# Patient Record
Sex: MALE | Race: WHITE | Employment: UNEMPLOYED | URBAN - METROPOLITAN AREA
[De-identification: names, ages, dates, MRNs, and addresses within clinical notes are randomized per-mention and may not be internally consistent; named-entity substitution may affect disease eponyms.]

---

## 2019-12-03 ENCOUNTER — HOSPITAL ENCOUNTER (INPATIENT)
Age: 39
LOS: 8 days | Discharge: HOME | DRG: 175 | End: 2019-12-11
Attending: INTERNAL MEDICINE | Admitting: INTERNAL MEDICINE
Payer: MEDICAID

## 2019-12-03 DIAGNOSIS — I20.0 UNSTABLE ANGINA (HCC): ICD-10-CM

## 2019-12-03 DIAGNOSIS — I50.9 CONGESTIVE HEART FAILURE (CHF) (HCC): ICD-10-CM

## 2019-12-03 PROCEDURE — 65660000000 HC RM CCU STEPDOWN

## 2019-12-03 NOTE — Clinical Note
Lesion: Located in the Distal RCA. Stent deployed. Single technique used. First inflation pressure = 12 yan; inflation time: 10 sec.

## 2019-12-03 NOTE — Clinical Note
Right radial clipped, prepped with ChloraPrep and draped. Wet prep solution applied at: 755. Wet prep solution dried at: 757. Wet prep elapsed drying time: 2 mins.

## 2019-12-03 NOTE — Clinical Note
Lesion: Located in the R JATINDER. Stent deployed. Multiple inflations used. First inflation pressure = 10 yan; inflation time: 12 sec. Second inflation pressure: 12 yan; inflation time: 6 sec. Third inflation pressure: 12 yan; inflation time: 8 sec.

## 2019-12-03 NOTE — Clinical Note
Lesion located in the Distal RCA. Balloon inflated using multiple inflations inflation technique. Pressure = 8 yan; Duration = 10 sec. Inflation 2: Pressure: 8 yan; Duration: 7 sec.

## 2019-12-03 NOTE — Clinical Note
TRANSFER - OUT REPORT:     Verbal report given to: [facility].. Report consisted of patient's Situation, Background, Assessment and   Recommendations(SBAR). Opportunity for questions and clarification was provided. Patient transported with a Registered Nurse. Patient transported to: IVCU.

## 2019-12-03 NOTE — Clinical Note
TRANSFER - OUT REPORT:     Verbal report given to: KEYANA Briggs. Report consisted of patient's Situation, Background, Assessment and   Recommendations(SBAR). Opportunity for questions and clarification was provided. Patient transported to: IVCU.

## 2019-12-04 ENCOUNTER — APPOINTMENT (OUTPATIENT)
Dept: NON INVASIVE DIAGNOSTICS | Age: 39
DRG: 175 | End: 2019-12-04
Attending: INTERNAL MEDICINE
Payer: MEDICAID

## 2019-12-04 ENCOUNTER — APPOINTMENT (OUTPATIENT)
Dept: VASCULAR SURGERY | Age: 39
DRG: 175 | End: 2019-12-04
Attending: NURSE PRACTITIONER
Payer: MEDICAID

## 2019-12-04 PROBLEM — I10 HYPERTENSION: Status: ACTIVE | Noted: 2019-12-04

## 2019-12-04 LAB
ANION GAP SERPL CALC-SCNC: 7 MMOL/L (ref 5–15)
AV VELOCITY RATIO: 0.75
AV VTI RATIO: 0.8
BUN SERPL-MCNC: 25 MG/DL (ref 6–20)
BUN/CREAT SERPL: 20 (ref 12–20)
CALCIUM SERPL-MCNC: 8.3 MG/DL (ref 8.5–10.1)
CHLORIDE SERPL-SCNC: 109 MMOL/L (ref 97–108)
CO2 SERPL-SCNC: 26 MMOL/L (ref 21–32)
CREAT SERPL-MCNC: 1.27 MG/DL (ref 0.7–1.3)
ECHO AO ROOT DIAM: 3.16 CM
ECHO AV AREA PEAK VELOCITY: 2.4 CM2
ECHO AV AREA VTI: 2.7 CM2
ECHO AV MEAN GRADIENT: 2.1 MMHG
ECHO AV MEAN VELOCITY: 0.68 M/S
ECHO AV PEAK GRADIENT: 4.1 MMHG
ECHO AV PEAK VELOCITY: 100.89 CM/S
ECHO AV VTI: 11.95 CM
ECHO EST RA PRESSURE: 10 MMHG
ECHO LA MAJOR AXIS: 4.28 CM
ECHO LA TO AORTIC ROOT RATIO: 1.36
ECHO LV E' LATERAL VELOCITY: 8.3 CM/S
ECHO LV E' SEPTAL VELOCITY: 4.01 CM/S
ECHO LV INTERNAL DIMENSION DIASTOLIC MMODE: 5.65 CM
ECHO LV INTERNAL DIMENSION DIASTOLIC: 6.2 CM (ref 4.2–5.9)
ECHO LV INTERNAL DIMENSION SYSTOLIC MMODE: 5.03 CM
ECHO LV INTERNAL DIMENSION SYSTOLIC: 5.25 CM
ECHO LV IVSD: 1.16 CM (ref 0.6–1)
ECHO LV MASS 2D: 414 G (ref 88–224)
ECHO LV MASS INDEX 2D: 191.6 G/M2 (ref 49–115)
ECHO LV POSTERIOR WALL DIASTOLIC: 1.1 CM (ref 0.6–1)
ECHO LVOT CARDIAC OUTPUT: 3.3 L/MIN
ECHO LVOT DIAM: 2.01 CM
ECHO LVOT PEAK GRADIENT: 2.3 MMHG
ECHO LVOT PEAK VELOCITY: 75.24 CM/S
ECHO LVOT SV: 31.7 ML
ECHO LVOT VTI: 10.02 CM
ECHO MV A VELOCITY: 42.25 CM/S
ECHO MV AREA PHT: 7.3 CM2
ECHO MV AREA VTI: 3.5 CM2
ECHO MV E DECELERATION TIME (DT): 71.8 MS
ECHO MV E VELOCITY: 82.48 CM/S
ECHO MV E/A RATIO: 1.95
ECHO MV E/E' LATERAL: 9.94
ECHO MV E/E' RATIO (AVERAGED): 15.25
ECHO MV E/E' SEPTAL: 20.57
ECHO MV MAX VELOCITY: 84.23 CM/S
ECHO MV MEAN GRADIENT: 1 MMHG
ECHO MV MEAN INFLOW VELOCITY: 0.46 M/S
ECHO MV PEAK GRADIENT: 2.8 MMHG
ECHO MV PRESSURE HALF TIME (PHT): 30.2 MS
ECHO MV VTI: 9.02 CM
ECHO PULMONARY ARTERY SYSTOLIC PRESSURE (PASP): 56.5 MMHG
ECHO PV MAX VELOCITY: 69.16 CM/S
ECHO PV MEAN GRADIENT: 0.9 MMHG
ECHO PV PEAK GRADIENT: 1.9 MMHG
ECHO PV VTI: 9.13 CM
ECHO RIGHT VENTRICULAR SYSTOLIC PRESSURE (RVSP): 56.5 MMHG
ECHO TV REGURGITANT MAX VELOCITY: 340.81 CM/S
ECHO TV REGURGITANT PEAK GRADIENT: 46.5 MMHG
ERYTHROCYTE [DISTWIDTH] IN BLOOD BY AUTOMATED COUNT: 17.4 % (ref 11.5–14.5)
GLUCOSE BLD STRIP.AUTO-MCNC: 126 MG/DL (ref 65–100)
GLUCOSE BLD STRIP.AUTO-MCNC: 147 MG/DL (ref 65–100)
GLUCOSE BLD STRIP.AUTO-MCNC: 157 MG/DL (ref 65–100)
GLUCOSE SERPL-MCNC: 70 MG/DL (ref 65–100)
HCT VFR BLD AUTO: 35.4 % (ref 36.6–50.3)
HGB BLD-MCNC: 10.6 G/DL (ref 12.1–17)
LVFS 2D: 2.02 %
LVFS: 10.91 %
LVOT MG: 0.94 MMHG
LVOT MV: 0.42 CM/S
MCH RBC QN AUTO: 23.5 PG (ref 26–34)
MCHC RBC AUTO-ENTMCNC: 29.9 G/DL (ref 30–36.5)
MCV RBC AUTO: 78.5 FL (ref 80–99)
MV DEC SLOPE: 11.49
NRBC # BLD: 0 K/UL (ref 0–0.01)
NRBC BLD-RTO: 0 PER 100 WBC
PLATELET # BLD AUTO: 191 K/UL (ref 150–400)
PMV BLD AUTO: 11.1 FL (ref 8.9–12.9)
POTASSIUM SERPL-SCNC: 3.4 MMOL/L (ref 3.5–5.1)
PULMONARY ARTERY END DIASTOLIC PRESSURE: 16.4 MMHG
PULMONARY ARTERY MEAN PRESURE: 29.7 MMHG
PV END DIASTOLIC VELOCITY: 1.3 MMHG
RBC # BLD AUTO: 4.51 M/UL (ref 4.1–5.7)
SERVICE CMNT-IMP: ABNORMAL
SODIUM SERPL-SCNC: 142 MMOL/L (ref 136–145)
TROPONIN I SERPL-MCNC: <0.05 NG/ML
TROPONIN I SERPL-MCNC: <0.05 NG/ML
WBC # BLD AUTO: 4.7 K/UL (ref 4.1–11.1)

## 2019-12-04 PROCEDURE — C8929 TTE W OR WO FOL WCON,DOPPLER: HCPCS

## 2019-12-04 PROCEDURE — 85027 COMPLETE CBC AUTOMATED: CPT

## 2019-12-04 PROCEDURE — 80048 BASIC METABOLIC PNL TOTAL CA: CPT

## 2019-12-04 PROCEDURE — 65660000000 HC RM CCU STEPDOWN

## 2019-12-04 PROCEDURE — 74011000258 HC RX REV CODE- 258: Performed by: INTERNAL MEDICINE

## 2019-12-04 PROCEDURE — 74011250636 HC RX REV CODE- 250/636: Performed by: INTERNAL MEDICINE

## 2019-12-04 PROCEDURE — 36415 COLL VENOUS BLD VENIPUNCTURE: CPT

## 2019-12-04 PROCEDURE — 74011250637 HC RX REV CODE- 250/637: Performed by: NURSE PRACTITIONER

## 2019-12-04 PROCEDURE — 84484 ASSAY OF TROPONIN QUANT: CPT

## 2019-12-04 PROCEDURE — 82962 GLUCOSE BLOOD TEST: CPT

## 2019-12-04 PROCEDURE — 93922 UPR/L XTREMITY ART 2 LEVELS: CPT

## 2019-12-04 PROCEDURE — 94760 N-INVAS EAR/PLS OXIMETRY 1: CPT

## 2019-12-04 PROCEDURE — 77030011943

## 2019-12-04 RX ORDER — IPRATROPIUM BROMIDE AND ALBUTEROL SULFATE 2.5; .5 MG/3ML; MG/3ML
3 SOLUTION RESPIRATORY (INHALATION)
Status: DISCONTINUED | OUTPATIENT
Start: 2019-12-04 | End: 2019-12-11 | Stop reason: HOSPADM

## 2019-12-04 RX ORDER — ENOXAPARIN SODIUM 100 MG/ML
40 INJECTION SUBCUTANEOUS EVERY 24 HOURS
Status: DISCONTINUED | OUTPATIENT
Start: 2019-12-04 | End: 2019-12-08

## 2019-12-04 RX ORDER — MAGNESIUM SULFATE 100 %
4 CRYSTALS MISCELLANEOUS AS NEEDED
Status: DISCONTINUED | OUTPATIENT
Start: 2019-12-04 | End: 2019-12-11 | Stop reason: HOSPADM

## 2019-12-04 RX ORDER — FUROSEMIDE 10 MG/ML
40 INJECTION INTRAMUSCULAR; INTRAVENOUS 2 TIMES DAILY
Status: DISCONTINUED | OUTPATIENT
Start: 2019-12-04 | End: 2019-12-06

## 2019-12-04 RX ORDER — ONDANSETRON 2 MG/ML
4 INJECTION INTRAMUSCULAR; INTRAVENOUS
Status: DISCONTINUED | OUTPATIENT
Start: 2019-12-04 | End: 2019-12-11 | Stop reason: HOSPADM

## 2019-12-04 RX ORDER — SODIUM CHLORIDE 0.9 % (FLUSH) 0.9 %
5-40 SYRINGE (ML) INJECTION EVERY 8 HOURS
Status: DISCONTINUED | OUTPATIENT
Start: 2019-12-04 | End: 2019-12-09 | Stop reason: SDUPTHER

## 2019-12-04 RX ORDER — ACETAMINOPHEN 325 MG/1
650 TABLET ORAL
Status: DISCONTINUED | OUTPATIENT
Start: 2019-12-04 | End: 2019-12-11 | Stop reason: HOSPADM

## 2019-12-04 RX ORDER — SODIUM CHLORIDE 0.9 % (FLUSH) 0.9 %
5-40 SYRINGE (ML) INJECTION AS NEEDED
Status: DISCONTINUED | OUTPATIENT
Start: 2019-12-04 | End: 2019-12-09 | Stop reason: SDUPTHER

## 2019-12-04 RX ORDER — CARVEDILOL 3.12 MG/1
3.12 TABLET ORAL 2 TIMES DAILY
Status: DISCONTINUED | OUTPATIENT
Start: 2019-12-04 | End: 2019-12-05

## 2019-12-04 RX ORDER — BISACODYL 5 MG
5 TABLET, DELAYED RELEASE (ENTERIC COATED) ORAL DAILY PRN
Status: DISCONTINUED | OUTPATIENT
Start: 2019-12-04 | End: 2019-12-11 | Stop reason: HOSPADM

## 2019-12-04 RX ORDER — INSULIN LISPRO 100 [IU]/ML
INJECTION, SOLUTION INTRAVENOUS; SUBCUTANEOUS
Status: DISCONTINUED | OUTPATIENT
Start: 2019-12-04 | End: 2019-12-11 | Stop reason: HOSPADM

## 2019-12-04 RX ORDER — DEXTROSE 50 % IN WATER (D50W) INTRAVENOUS SYRINGE
12.5-25 AS NEEDED
Status: DISCONTINUED | OUTPATIENT
Start: 2019-12-04 | End: 2019-12-11 | Stop reason: HOSPADM

## 2019-12-04 RX ADMIN — PERFLUTREN 2 ML: 6.52 INJECTION, SUSPENSION INTRAVENOUS at 12:55

## 2019-12-04 RX ADMIN — Medication 10 ML: at 02:30

## 2019-12-04 RX ADMIN — CEFTRIAXONE 1 G: 1 INJECTION, POWDER, FOR SOLUTION INTRAMUSCULAR; INTRAVENOUS at 07:11

## 2019-12-04 RX ADMIN — FUROSEMIDE 40 MG: 10 INJECTION, SOLUTION INTRAMUSCULAR; INTRAVENOUS at 17:39

## 2019-12-04 RX ADMIN — FUROSEMIDE 40 MG: 10 INJECTION, SOLUTION INTRAMUSCULAR; INTRAVENOUS at 14:43

## 2019-12-04 RX ADMIN — Medication 10 ML: at 14:44

## 2019-12-04 RX ADMIN — CARVEDILOL 3.12 MG: 3.12 TABLET, FILM COATED ORAL at 17:38

## 2019-12-04 RX ADMIN — ENOXAPARIN SODIUM 40 MG: 40 INJECTION SUBCUTANEOUS at 02:15

## 2019-12-04 RX ADMIN — Medication 10 ML: at 22:05

## 2019-12-04 RX ADMIN — Medication 10 ML: at 07:12

## 2019-12-04 NOTE — PROGRESS NOTES
Patient admitted to room 2217 via stretcher. Patient direct admit from [facility].  Patient alert & oriented x3. VSS. Assessment completed. Call bell within reach. Sinus rhythm on tele monitor.   Notified Dr. Francy Gottron of patient's arrival.

## 2019-12-04 NOTE — WOUND CARE
Wound care nurse consult for POA DM feet wounds. Left dorsal foot:  
 
Right plantar 5th metatarsal 
 
Right plantar 1st metatarsal 
 
Right 1st toe wound with nail trimmed to close by patient: 
 
 
 
 
[address] Wound Foot Plantar;Medial;Right 1st metatarsal head (Active) Dressing Type Open to air 12/4/2019  3:16 PM  
Non-staged Wound Description Full thickness 12/4/2019  3:16 PM  
Wound Length (cm) 0.5 cm 12/4/2019  3:16 PM  
Wound Width (cm) 1 cm 12/4/2019  3:16 PM  
Wound Depth (cm) 0.3 cm 12/4/2019  3:16 PM  
Wound Surface Area (cm^2) 0.5 cm^2 12/4/2019  3:16 PM  
Wound Volume (cm^3) 0.15 cm^3 12/4/2019  3:16 PM  
Condition of [place] 12/4/2019  3:16 PM  
Condition of Edges Calloused 12/4/2019  3:16 PM  
Assessment Dry;Red 12/4/2019  8:00 AM  
Drainage Amount Scant 12/4/2019  3:16 PM  
Drainage Color Serosanguinous 12/4/2019  3:16 PM  
Wound Odor None 12/4/2019  3:16 PM  
Coco-wound Assessment Dry 12/4/2019  3:16 PM  
Cleansing and Cleansing Agents  Dermal wound cleanser 12/4/2019  3:16 PM  
Dressing Changed Changed/New 12/4/2019  3:16 PM  
Dressing Type Applied Xeroform;4 x 4;Gauze wrap (roberto) 12/4/2019  3:16 PM  
Procedure Tolerated Well 12/4/2019  3:16 PM  
Number of days: 1 Wound Leg lower Left 1 x 0.75 cm weeping, yellow ulcer (Active) Dressing Type Open to air 12/4/2019  3:16 PM  
Non-staged Wound Description Partial thickness 12/4/2019  3:16 PM  
Wound Length (cm) 1.5 cm 12/4/2019  3:16 PM  
Wound Width (cm) 1.5 cm 12/4/2019  3:16 PM  
Wound Depth (cm) 0.1 cm 12/4/2019  3:16 PM  
Wound Surface Area (cm^2) 2.25 cm^2 12/4/2019  3:16 PM  
Wound Volume (cm^3) 0.22 cm^3 12/4/2019  3:16 PM  
Condition of [place] 12/4/2019  3:16 PM  
Assessment Dry;Red 12/4/2019  8:00 AM  
Drainage Amount None 12/4/2019  3:16 PM  
Wound Odor None 12/4/2019  3:16 PM  
Coco-wound Assessment Blanchable erythema;Edema 12/4/2019  3:16 PM  
Procedure Tolerated Well 12/4/2019  3:16 PM  
 Number of days: 1 Wound Foot Left;Dorsal (Active) Dressing Type Open to air 12/4/2019  3:16 PM  
Non-staged Wound Description Full thickness 12/4/2019  3:16 PM  
Wound Length (cm) 1.5 cm 12/4/2019  3:16 PM  
Wound Width (cm) 2 cm 12/4/2019  3:16 PM  
Wound Surface Area (cm^2) 3 cm^2 12/4/2019  3:16 PM  
Condition of Base [place];Slough 12/4/2019  3:16 PM  
Tissue Type Percent Pink 50 12/4/2019  3:16 PM  
Tissue Type Percent Yellow 50 12/4/2019  3:16 PM  
Drainage Amount None 12/4/2019  3:16 PM  
Wound Odor None 12/4/2019  3:16 PM  
Coco-wound Assessment Blanchable erythema 12/4/2019  3:16 PM  
Cleansing and Cleansing Agents  Dermal wound cleanser 12/4/2019  3:16 PM  
Dressing Changed Changed/New 12/4/2019  3:16 PM  
Dressing Type Applied Xeroform; Foam 12/4/2019  3:16 PM  
Procedure Tolerated Well 12/4/2019  3:16 PM  
Number of days: 0 Wound Foot Right;Plantar;Lateral 5th metatarsal head (Active) Dressing Type Open to air 12/4/2019  3:16 PM  
Non-staged Wound Description Full thickness 12/4/2019  3:16 PM  
Wound Length (cm) 1 cm 12/4/2019  3:16 PM  
Wound Width (cm) 1 cm 12/4/2019  3:16 PM  
Wound Depth (cm) 0.3 cm 12/4/2019  3:16 PM  
Wound Surface Area (cm^2) 1 cm^2 12/4/2019  3:16 PM  
Wound Volume (cm^3) 0.3 cm^3 12/4/2019  3:16 PM  
Condition of [facility] 12/4/2019  3:16 PM  
Condition of Edges Calloused 12/4/2019  3:16 PM  
Tissue Type Percent Yellow 100 12/4/2019  3:16 PM  
Drainage Amount Scant 12/4/2019  3:16 PM  
Drainage Color Serous 12/4/2019  3:16 PM  
Wound Odor None 12/4/2019  3:16 PM  
Coco-wound Assessment Blanchable erythema; Intact 12/4/2019  3:16 PM  
Cleansing and Cleansing Agents  Dermal wound cleanser 12/4/2019  3:16 PM  
Dressing Changed Changed/New 12/4/2019  3:16 PM  
Dressing Type Applied Xeroform;4 x 4;Gauze wrap (roberto) 12/4/2019  3:16 PM  
Procedure Tolerated Well 12/4/2019  3:16 PM  
Number of days: 0 Wound Toe (Comment  which one) Right;Dorsal 1st toe (Active) Dressing Type Open to air 12/4/2019  3:16 PM  
Non-staged Wound Description Partial thickness 12/4/2019  3:16 PM  
Wound Length (cm) 0.5 cm 12/4/2019  3:16 PM  
Wound Width (cm) 0.4 cm 12/4/2019  3:16 PM  
Wound Depth (cm) 0.2 cm 12/4/2019  3:16 PM  
Wound Surface Area (cm^2) 0.2 cm^2 12/4/2019  3:16 PM  
Wound Volume (cm^3) 0.04 cm^3 12/4/2019  3:16 PM  
Condition of Base [place] 12/4/2019  3:16 PM  
Condition of Edges Open 12/4/2019  3:16 PM  
Drainage Amount None 12/4/2019  3:16 PM  
Wound Odor None 12/4/2019  3:16 PM  
Coco-wound Assessment Blanchable erythema 12/4/2019  3:16 PM  
Cleansing and Cleansing Agents  Dermal wound cleanser 12/4/2019  3:16 PM  
Dressing Type Applied Open to air 12/4/2019  3:16 PM  
Procedure Tolerated Well 12/4/2019  3:16 PM  
Number of days: 0 Recommend: 
 
Bilateral feet wounds: daily, cleanse with dermal wound cleanser spray and wipe clean with 4x4. Apply Neosporin ointment to each wound. Cover left dorsal foot wound with a foam dressing and right foot with dry 4x4's secured with gauze roberto.  
 
Meghana Pradhan RN, WOCN

## 2019-12-04 NOTE — PROGRESS NOTES
Problem: Patient Education: Go to Patient Education Activity Goal: Patient/Family Education Outcome: Progressing Towards Goal 
  
Problem: Heart Failure: Day 1 Goal: Off Pathway (Use only if patient is Off Pathway) Outcome: Progressing Towards Goal 
Goal: Activity/Safety Outcome: Progressing Towards Goal 
Goal: Consults, if ordered Outcome: Progressing Towards Goal 
Goal: Diagnostic Test/Procedures Outcome: Progressing Towards Goal 
Goal: Nutrition/Diet Outcome: Progressing Towards Goal 
Goal: Discharge Planning Outcome: Progressing Towards Goal 
Goal: Medications Outcome: Progressing Towards Goal 
Goal: Respiratory Outcome: Progressing Towards Goal 
Goal: Treatments/Interventions/Procedures Outcome: Progressing Towards Goal 
Goal: Psychosocial 
Outcome: Progressing Towards Goal 
Goal: *Oxygen saturation within defined limits Outcome: Progressing Towards Goal 
Goal: *Hemodynamically stable Outcome: Progressing Towards Goal 
Goal: *Optimal pain control at patient's stated goal 
Outcome: Progressing Towards Goal 
Goal: *Anxiety reduced or absent Outcome: Progressing Towards Goal 
  
Problem: Risk for Spread of Infection Goal: Prevent transmission of infectious organism to others Description Prevent the transmission of infectious organisms to other patients, staff members, and visitors. Outcome: Progressing Towards Goal 
  
Problem: Patient Education:  Go to Education Activity Goal: Patient/Family Education Outcome: Progressing Towards Goal 
  
Problem: Pressure Injury - Risk of 
Goal: *Prevention of pressure injury Description Document Shukri Scale and appropriate interventions in the flowsheet.  
Outcome: Progressing Towards Goal 
Note: Pressure Injury Interventions: 
Sensory Interventions: Keep linens dry and wrinkle-free, Maintain/enhance activity level, Minimize linen layers, Pressure redistribution bed/mattress (bed type), Sit a 90-degree angle/use footstool if needed, Turn and reposition approx. every two hours (pillows and wedges if needed), Use 30-degree side-lying position Moisture Interventions: Absorbent underpads, Check for incontinence Q2 hours and as needed, Minimize layers, Offer toileting Q_hr Activity Interventions: Increase time out of bed, Pressure redistribution bed/mattress(bed type), PT/OT evaluation Mobility Interventions: HOB 30 degrees or less, Pressure redistribution bed/mattress (bed type), PT/OT evaluation, Turn and reposition approx. every two hours(pillow and wedges), Float heels Nutrition Interventions: Document food/fluid/supplement intake Friction and Shear Interventions: HOB 30 degrees or less, Minimize layers, Sit at 90-degree angle Problem: Patient Education: Go to Patient Education Activity Goal: Patient/Family Education Outcome: Progressing Towards Goal

## 2019-12-04 NOTE — PROGRESS NOTES
0935: Called cardiology consult and spoke with Víctor Espitia, primary care RN aware 
5687: Called vascular surgery consult and spoke with Mela Mckee primary care RN aware

## 2019-12-04 NOTE — CONSULTS
Vascular Surgery Consult Note 2019 Subjective:  
 
Janusz Moeller is a 44 y.o. male with a pmhx significant for CHF, obesity, and DM. He is admitted to the hospital with CHF exacerbation w/ anasarca. He presents w/ bilateral foot ulcerations and we have been asked to evaluate. He admits to non-compliance w/ his oral diabetic medications. Past Medical History Diabetes Obesity CHF Medical non-compliance Past Surgical History:  
Procedure Laterality Date  HX AMPUTATION  10/02/2012 Right 3rd toe Family History Problem Relation Age of Onset  Heart Disease Maternal Grandmother  Cancer Paternal Grandmother   
     Richard Jurist  Stroke Paternal Grandfather Social History Tobacco Use  Smoking status: Former Smoker Last attempt to quit: 2019 Years since quittin.0  Smokeless tobacco: Never Used Substance Use Topics  Alcohol use: No  
   
He is routinely independent of his ADLs. He has a supportive father and sister. Prior to Admission medications Not on File No Known Allergies Review of Systems Objective:  
 
 
Patient Vitals for the past 24 hrs: 
 BP Temp Pulse Resp SpO2 Height Weight 19 1225 (!) 142/103     6' 1\" (1.854 m) 91.6 kg (202 lb) 19 1137 (!) 142/103 98.2 °F (36.8 °C) (!) 106 18 98 %    
19 0800 (!) 138/96 98.1 °F (36.7 °C) 94 18 99 %    
19 0605       91.6 kg (202 lb) 19 0327 136/79 97.9 °F (36.6 °C) 91 18 97 %    
19 2245 (!) 152/96 98.4 °F (36.9 °C) 93 18 100 % 6' 1\" (1.854 m) 91.8 kg (202 lb 6.1 oz) Physical Exam 
Constitutional:   
   Appearance: He is obese. HENT:  
   Head: Normocephalic. Nose: Nose normal.  
   Mouth/Throat:  
   Mouth: Mucous membranes are moist.  
Eyes:  
   Pupils: Pupils are equal, round, and reactive to light. Neck: Musculoskeletal: Normal range of motion. Cardiovascular: Rate and Rhythm: Normal rate and regular rhythm. Pulses:     
     Femoral pulses are 2+ on the right side and 2+ on the left side. Popliteal pulses are 2+ on the right side and 2+ on the left side. Dorsalis pedis pulses are 1+ on the right side and 1+ on the left side. Comments: Distal pulse difficult to assess due to edema Pulmonary:  
   Effort: Pulmonary effort is normal.  
   Breath sounds: Normal breath sounds. Abdominal:  
   General: Abdomen is flat. Palpations: Abdomen is soft. Musculoskeletal: Normal range of motion. Skin: 
   General: Skin is warm. Comments: Ulcer to left anterior foot Ulcer to left shin Ulcer to left lateral leg Ulcer to the dorsum of the right foot Ulcer to the superior right great toe Ulcer to the ball of the right foot Neurological:  
   General: No focal deficit present. Mental Status: He is alert and oriented to person, place, and time. Psychiatric:     
   Mood and Affect: Mood normal.     
   Behavior: Behavior normal.  
 
 
Pertinent Test Results:  
Recent Results (from the past 24 hour(s)) EKG, 12 LEAD, INITIAL Collection Time: 12/03/19  3:21 PM  
Result Value Ref Range Ventricular Rate 100 BPM  
 Atrial Rate 100 BPM  
 P-R Interval 136 ms QRS Duration 90 ms Q-T Interval 366 ms  
 QTC Calculation (Bezet) 472 ms Calculated P Axis 60 degrees Calculated R Axis -41 degrees Calculated T Axis 129 degrees Diagnosis Sinus rhythm with premature atrial complexes Left axis deviation ST & T wave abnormality, consider lateral ischemia Abnormal ECG No previous ECGs available CBC WITH AUTOMATED DIFF Collection Time: 12/03/19  3:28 PM  
Result Value Ref Range WBC 4.5 4.1 - 11.1 K/uL  
 RBC 5.07 4. 10 - 5.70 M/uL  
 HGB 11.8 (L) 12.1 - 17.0 g/dL HCT 40.1 36.6 - 50.3 % MCV 79.1 (L) 80.0 - 99.0 FL  
 MCH 23.3 (L) 26.0 - 34.0 PG  
 MCHC 29.4 (L) 30.0 - 36.5 g/dL RDW 17.5 (H) 11.5 - 14.5 % PLATELET 371 085 - 608 K/uL MPV 10.9 8.9 - 12.9 FL  
 NRBC 0.0 0  WBC ABSOLUTE NRBC 0.00 0.00 - 0.01 K/uL NEUTROPHILS 64 32 - 75 % LYMPHOCYTES 24 12 - 49 % MONOCYTES 10 5 - 13 % EOSINOPHILS 2 0 - 7 % BASOPHILS 0 0 - 1 % IMMATURE GRANULOCYTES 0 0.0 - 0.5 % ABS. NEUTROPHILS 2.9 1.8 - 8.0 K/UL  
 ABS. LYMPHOCYTES 1.1 0.8 - 3.5 K/UL  
 ABS. MONOCYTES 0.4 0.0 - 1.0 K/UL  
 ABS. EOSINOPHILS 0.1 0.0 - 0.4 K/UL  
 ABS. BASOPHILS 0.0 0.0 - 0.1 K/UL  
 ABS. IMM. GRANS. 0.0 0.00 - 0.04 K/UL  
 DF AUTOMATED METABOLIC PANEL, COMPREHENSIVE Collection Time: 12/03/19  3:28 PM  
Result Value Ref Range Sodium 142 136 - 145 mmol/L Potassium 3.7 3.5 - 5.1 mmol/L Chloride 106 97 - 108 mmol/L  
 CO2 25 21 - 32 mmol/L Anion gap 11 5 - 15 mmol/L Glucose 92 65 - 100 mg/dL BUN 26 (H) 6 - 20 MG/DL Creatinine 1.36 (H) 0.70 - 1.30 MG/DL  
 BUN/Creatinine ratio 19 12 - 20 GFR est AA >60 >60 ml/min/1.73m2 GFR est non-AA 58 (L) >60 ml/min/1.73m2 Calcium 8.7 8.5 - 10.1 MG/DL Bilirubin, total 1.0 0.2 - 1.0 MG/DL  
 ALT (SGPT) 16 12 - 78 U/L  
 AST (SGOT) 16 15 - 37 U/L Alk. phosphatase 131 (H) 45 - 117 U/L Protein, total 6.6 6.4 - 8.2 g/dL Albumin 2.9 (L) 3.5 - 5.0 g/dL Globulin 3.7 2.0 - 4.0 g/dL A-G Ratio 0.8 (L) 1.1 - 2.2 LIPASE Collection Time: 12/03/19  3:28 PM  
Result Value Ref Range Lipase 262 73 - 393 U/L  
TROPONIN I Collection Time: 12/03/19  3:28 PM  
Result Value Ref Range Troponin-I, Qt. <0.05 <0.05 ng/mL MAGNESIUM Collection Time: 12/03/19  3:28 PM  
Result Value Ref Range Magnesium 2.1 1.6 - 2.4 mg/dL PHOSPHORUS Collection Time: 12/03/19  3:28 PM  
Result Value Ref Range Phosphorus 4.9 (H) 2.6 - 4.7 MG/DL PROTHROMBIN TIME + INR Collection Time: 12/03/19  3:28 PM  
Result Value Ref Range INR 1.3 (H) 0.9 - 1.1 Prothrombin time 12.3 (H) 9.0 - 11.1 sec CULTURE, BLOOD, PAIRED Collection Time: 12/03/19  3:28 PM  
Result Value Ref Range Special Requests: NO SPECIAL REQUESTS Culture result: NO GROWTH AFTER 14 HOURS    
LACTIC ACID Collection Time: 12/03/19  3:28 PM  
Result Value Ref Range Lactic acid 1.4 0.4 - 2.0 MMOL/L  
TSH 3RD GENERATION Collection Time: 12/03/19  3:28 PM  
Result Value Ref Range TSH 4.57 (H) 0.36 - 3.74 uIU/mL NT-PRO BNP Collection Time: 12/03/19  3:28 PM  
Result Value Ref Range NT pro-BNP 9,576 (H) 0 - 125 PG/ML  
URINALYSIS W/ RFLX MICROSCOPIC Collection Time: 12/03/19  4:44 PM  
Result Value Ref Range Color DARK YELLOW Appearance CLEAR CLEAR Specific gravity 1.033 (H) 1.003 - 1.030    
 pH (UA) 5.0 5.0 - 8.0 Protein 300 (A) NEG mg/dL Glucose NEGATIVE  NEG mg/dL Ketone NEGATIVE  NEG mg/dL Bilirubin NEGATIVE  NEG Blood SMALL (A) NEG Urobilinogen 4.0 (H) 0.2 - 1.0 EU/dL Nitrites NEGATIVE  NEG Leukocyte Esterase NEGATIVE  NEG    
URINE MICROSCOPIC ONLY Collection Time: 12/03/19  4:44 PM  
Result Value Ref Range WBC 0-4 0 - 4 /hpf  
 RBC 0-5 0 - 5 /hpf Epithelial cells FEW FEW /lpf Bacteria NEGATIVE  NEG /hpf METABOLIC PANEL, BASIC Collection Time: 12/04/19  2:14 AM  
Result Value Ref Range Sodium 142 136 - 145 mmol/L Potassium 3.4 (L) 3.5 - 5.1 mmol/L Chloride 109 (H) 97 - 108 mmol/L  
 CO2 26 21 - 32 mmol/L Anion gap 7 5 - 15 mmol/L Glucose 70 65 - 100 mg/dL BUN 25 (H) 6 - 20 MG/DL Creatinine 1.27 0.70 - 1.30 MG/DL  
 BUN/Creatinine ratio 20 12 - 20 GFR est AA >60 >60 ml/min/1.73m2 GFR est non-AA >60 >60 ml/min/1.73m2 Calcium 8.3 (L) 8.5 - 10.1 MG/DL  
CBC W/O DIFF Collection Time: 12/04/19  2:14 AM  
Result Value Ref Range WBC 4.7 4.1 - 11.1 K/uL  
 RBC 4.51 4.10 - 5.70 M/uL  
 HGB 10.6 (L) 12.1 - 17.0 g/dL HCT 35.4 (L) 36.6 - 50.3 %  MCV 78.5 (L) 80.0 - 99.0 FL  
 MCH 23.5 (L) 26.0 - 34.0 PG  
 MCHC 29.9 (L) 30.0 - 36.5 g/dL  
 RDW 17.4 (H) 11.5 - 14.5 % PLATELET 925 908 - 575 K/uL MPV 11.1 8.9 - 12.9 FL  
 NRBC 0.0 0  WBC ABSOLUTE NRBC 0.00 0.00 - 0.01 K/uL  
TROPONIN I Collection Time: 12/04/19  2:14 AM  
Result Value Ref Range Troponin-I, Qt. <0.05 <0.05 ng/mL TROPONIN I Collection Time: 12/04/19  7:59 AM  
Result Value Ref Range Troponin-I, Qt. <0.05 <0.05 ng/mL GLUCOSE, POC Collection Time: 12/04/19 11:35 AM  
Result Value Ref Range Glucose (POC) 157 (H) 65 - 100 mg/dL Performed by Dileep Turner (PCT) ANKLE BRACHIAL INDEX Collection Time: 12/04/19 12:38 PM  
Result Value Ref Range Left arm  mmHg Left posterior tibial 174 mmHg Left anterior tibial 188 mmHg Left toe pressure 154 mmHg Right arm  mmHg Right posterior tibial 172 mmHg Right anterior tibial 165 mmHg Right toe pressure 161 mmHg Left DARIO 1.25 Right DARIO 1.14 Left TBI 1.02 Right TBI 1.07   
ECHO ADULT COMPLETE Collection Time: 12/04/19  1:02 PM  
Result Value Ref Range LV E' Lateral Velocity 8.30 cm/s LV E' Septal Velocity 4.01 cm/s Ao Root D 3.16 cm Aortic Valve Systolic Peak Velocity 192.24 cm/s AoV VTI 11.95 cm Aortic Valve Area by Continuity of Peak Velocity 2.4 cm2 Aortic Valve Area by Continuity of VTI 2.7 cm2 AoV PG 4.1 mmHg LVIDd 5.36 4.2 - 5.9 cm  
 LVPWd 1.75 (A) 0.6 - 1.0 cm LVIDs 5.25 cm IVSd 1.16 (A) 0.6 - 1.0 cm  
 LVOT d 2.01 cm  
 LVOT Peak Velocity 75.24 cm/s LVOT Peak Gradient 2.3 mmHg LVOT VTI 10.02 cm  
 MVA (PHT) 7.3 cm2  
 MV A Trevon 42.25 cm/s  
 MV E Trevon 82.48 cm/s  
 MV E/A 2.00   
 MV Mean Gradient 1.0 mmHg Mitral Valve Annulus Velocity Time Integral 9.02 cm Left Atrium to Aortic Root Ratio 1.36 Pulmonic Valve Systolic Mean Gradient 0.9 mmHg Pulmonic Valve Systolic Velocity Time Integral 9.13 cm Aortic Valve Systolic Mean Gradient 2.1 mmHg LVOT Cardiac Output 3.3 L/min LV Mass .0 (A) 88 - 224 g LV Mass AL Index 191.6 49 - 115 g/m2 E/E' lateral 9.94   
 E/E' septal 20.57 RVSP 56.5 mmHg LVIDs (M-mode) 5.03 cm LVIDd (M-mode) 5.65 cm  
 MV Peak Gradient 2.8 mmHg E/E' ratio (averaged) 15.25 Est. RA Pressure 10.0 mmHg Mitral Valve E Wave Deceleration Time 71.8 ms Mitral Valve Pressure Half-time 30.2 ms Left Atrium Major Axis 4.28 cm Triscuspid Valve Regurgitation Peak Gradient 46.5 mmHg Pulmonic Valve Max Velocity 69.16 cm/s Mitral Valve Max Velocity 84.23 cm/s MVA VTI 3.5 cm2 LVOT SV 31.7 ml  
 TR Max Velocity 340.81 cm/s PASP 56.5 mmHg Left Ventricular Fractional Shortening 05.142256989 % Left Ventricular Fractional Shortening by 2D 6.4727573469 % Left Ventricular Outflow Tract Mean Gradient 0.15323293434546 mmHg Left Ventricular Outflow Tract Mean Velocity 2.83568568904941 cm/s PV End Diastolic Velocity 1.3 mmHg Mitral Valve Deceleration [place] 87.915789624 Mitral valve mean inflow velocity 8.52982023916329 m/s AV Velocity Ratio 0.75 AV VTI Ratio 0.8 Aortic valve mean velocity 6.56152296219455 m/s Pulmonary Artery Mean Presure 29.7 mmHg PI End Diastolic Pressure 20.0 mmHg PV peak gradient 1.9 mmHg Assessmen/Plan:  
 
Consult problems Multiple non-healing DM ulcers to the bilateral lower extremities Suspect venous insufficiency/lymphedema but will obtain ABIs to exclude PAD. Dr. Jadyn Lucio to evaluate once resulted. Active problems: 
Acute congestive heart failure exacerbation Anasarca -ECHO pending  
-on IV diuresis Hypokalemia ASAD vs CKD stage III Anemia Diabetes Mellitus 
-HA1c pending Hypothyroidism Obesity Management of comorbid conditions by primary team. 
 
VTE Prophylaxis: LMWH Disposition: 
Home Signed By: Yamini Shabazz NP December 4, 2019

## 2019-12-04 NOTE — PROGRESS NOTES
Bedside shift change report GIVEN TO KEYANA Escudero. Report included the following information SBAR. SIGNIFICANT CHANGES DURING SHIFT:  [facility]. Daily wts to be obtained, serial troponin next due at 0815, wound care consulted, vascular surgery consulted, and begin sliding scale insulin. CONCERNS TO ADDRESS WITH MD:   
 
 
 
 
[facility] NURSING NOTE Admission Date 12/3/2019 Admission Diagnosis CHF (congestive heart failure) ([address] [I50.9] Consults IP CONSULT TO CARDIOLOGY 
IP CONSULT TO VASCULAR SURGERY Cardiac Monitoring [x] Yes [] No  
  
Purposeful Hourly Rounding [x] Yes   
Marah Score Total Score: 1 Marah score 3 or > [] Bed Alarm [] Avasys [] 1:1 sitter [] Patient refused (Signed refusal form in chart) Shukri Score Shukri Score: 13 Shukri score 14 or < [] PMT consult [] Wound Care consult  
 []  Specialty bed  [] Nutrition consult Influenza Vaccine Received Flu Vaccine for Current Season (usually Sept-March): No  
 Patient/Guardian Refused (Notify MD): No  
  
Oxygen needs? [x] Room air Oxygen @  []1L    []2L    []3L   []4L    []5L   []6L via NC Chronic home O2 use? [] Yes [x] No 
Perform O2 challenge test and document in progress note using smartphrase (.Homeoxygen) Last bowel movement Urinary Catheter LDAs Readmission Risk Assessment Tool Score Low Risk   
      
 4 Total Score   
  
 4 Pt. Coverage (Medicare=5 , Medicaid, or Self-Pay=4) Criteria that do not apply:  
 Has Seen PCP in Last 6 Months (Yes=3, No=0) . Living with Significant Other. Assisted Living. LTAC. SNF. or  
Rehab Patient Length of Stay (>5 days = 3) IP Visits Last 12 Months (1-3=4, 4=9, >4=11) Charlson Comorbidity Score (Age + Comorbid Conditions) Expected Length of Stay - - - Actual Length of Stay 1

## 2019-12-04 NOTE — H&P
Hospitalist Admission Note NAME: Neli Marquez :  1980 MRN:  [id number] Date/Time:  12/3/2019 11:23 PM 
 
Patient PCP: None 
______________________________________________________________________ Given the patient's current clinical presentation, I have a high level of concern for decompensation if discharged from the emergency department. Complex decision making was performed, which includes reviewing the patient's available past medical records, laboratory results, and x-ray films. My assessment of this patient's clinical condition and my plan of care is as follows. Assessment / Plan: 
 
Shortness of breath associated with bilateral lower extremity swelling most likely secondary to CHF Admit patient to [facility] Echocardiogram at a.m. Cardiology consultation Follow-up serial troponin Daily weight Bilateral foot ulcer associated with possible cellulitis and peripheral vascular disease Wound care consultation start patient on IV antibiotic 
vascular surgery consultation Continue wound care DM Start patient sliding scale with insulin Code Status: Full Surrogate Decision Maker:Yasemin Keene DVT Prophylaxis: Lovenox GI Prophylaxis: not indicated Subjective: CHIEF COMPLAINT: GI bleeding HISTORY OF PRESENT ILLNESS:    
44years old female with past medical history significant for diabetes was transferred from [facility] ED for treatment of shortness of breath associated with worsening bilateral leg swelling, patient stated his shortness of breath and leg swelling started about couple weeks ago worsening denies any fever denies any chills, patient stated his shortness of breath worsened with minimal exertion denies any chest pain, blood work in ED was significant for elevated proBNP 9576. We were asked to admit for work up and evaluation of the above problems. Past Medical History:  
Diagnosis Date  Diabetes ([facility] [address] Past Surgical History:  
Procedure Laterality Date  HX AMPUTATION  10/02/2012 Right 3rd toe Social History Tobacco Use  Smoking status: Former Smoker Last attempt to quit: 2019 Years since quittin.0  Smokeless tobacco: Never Used Substance Use Topics  Alcohol use: No  
  
 
Family History Problem Relation Age of Onset  Heart Disease Maternal Grandmother  Cancer Paternal Grandmother   
     Augusto Barron  Stroke Paternal Grandfather No Known Allergies Prior to Admission medications Not on File REVIEW OF SYSTEMS:    
I am not able to complete the review of systems because: The patient is intubated and sedated The patient has altered mental status due to his acute medical problems The patient has baseline aphasia from prior stroke(s) The patient has baseline dementia and is not reliable historian The patient is in acute medical distress and unable to provide information Total of 12 systems reviewed as follows:   
   POSITIVE= underlined text  Negative = text not underlined General:  fever, chills, sweats, generalized weakness, weight loss/gain,  
   loss of appetite Eyes:    blurred vision, eye pain, loss of vision, double vision ENT:    rhinorrhea, pharyngitis Respiratory:   cough, sputum production, SOB, DANIELSON, wheezing, pleuritic pain  
Cardiology:   chest pain, palpitations, orthopnea, PND, edema, syncope Gastrointestinal:  abdominal pain , N/V, diarrhea, dysphagia, constipation, bleeding Genitourinary:  frequency, urgency, dysuria, hematuria, incontinence Muskuloskeletal :  arthralgia, myalgia, back pain Hematology:  easy bruising, nose or gum bleeding, lymphadenopathy Dermatological: rash, ulceration, pruritis, color change / jaundice Endocrine:   hot flashes or polydipsia Neurological:  headache, dizziness, confusion, focal weakness, paresthesia, 
 Speech difficulties, memory loss, gait difficulty Psychological: Feelings of anxiety, depression, agitation Objective: VITALS:   
There were no vitals taken for this visit. PHYSICAL EXAM: 
 
General:    Alert, cooperative, no distress, appears stated age. HEENT: Atraumatic, anicteric sclerae, pink conjunctivae No oral ulcers, mucosa moist, throat clear, dentition fair Neck:  Supple, symmetrical,  thyroid: non tender Lungs:   B/l  rales. Chest wall:  No tenderness  No Accessory muscle use. Heart:   Regular  rhythm,  No  murmur   B/l edema Abdomen:   Soft, non-tender. Not distended. Bowel sounds normal 
Extremities: No cyanosis. No clubbing,   
  Skin turgor normal, Capillary refill normal, Radial dial pulse 2+ Skin:     B/l feet ulceration Psych:  Good insight. Not depressed. Not anxious or agitated. Neurologic: EOMs intact. No facial asymmetry. No aphasia or slurred speech. Symmetrical strength, Sensation grossly intact. Alert and oriented X 4.  
 
_______________________________________________________________________ Care Plan discussed with: 
  Comments Patient y Family RN y   
Care Manager Consultant:     
_______________________________________________________________________ Expected  Disposition:  
Home with Family y HH/PT/OT/RN   
SNF/LTC   
VERA   
________________________________________________________________________ TOTAL TIME:  60 Minutes Critical Care Provided     Minutes non procedure based Comments  
 y Reviewed previous records  
>50% of visit spent in counseling and coordination of care y Discussion with patient and/or family and questions answered 
  
 
________________________________________________________________________ Signed: Asher Chavez MD 
 
Procedures: see electronic medical records for all procedures/Xrays and details which were not copied into this note but were reviewed prior to creation of Plan. LAB DATA REVIEWED:   
Recent Results (from the past 24 hour(s)) EKG, 12 LEAD, INITIAL Collection Time: 12/03/19  3:21 PM  
Result Value Ref Range Ventricular Rate 100 BPM  
 Atrial Rate 100 BPM  
 P-R Interval 136 ms QRS Duration 90 ms Q-T Interval 366 ms  
 QTC Calculation (Bezet) 472 ms Calculated P Axis 60 degrees Calculated R Axis -41 degrees Calculated T Axis 129 degrees Diagnosis Sinus rhythm with premature atrial complexes Left axis deviation ST & T wave abnormality, consider lateral ischemia Abnormal ECG No previous ECGs available CBC WITH AUTOMATED DIFF Collection Time: 12/03/19  3:28 PM  
Result Value Ref Range WBC 4.5 4.1 - 11.1 K/uL  
 RBC 5.07 4. 10 - 5.70 M/uL  
 HGB 11.8 (L) 12.1 - 17.0 g/dL HCT 40.1 36.6 - 50.3 % MCV 79.1 (L) 80.0 - 99.0 FL  
 MCH 23.3 (L) 26.0 - 34.0 PG  
 MCHC 29.4 (L) 30.0 - 36.5 g/dL  
 RDW 17.5 (H) 11.5 - 14.5 % PLATELET 764 583 - 620 K/uL MPV 10.9 8.9 - 12.9 FL  
 NRBC 0.0 0  WBC ABSOLUTE NRBC 0.00 0.00 - 0.01 K/uL NEUTROPHILS 64 32 - 75 % LYMPHOCYTES 24 12 - 49 % MONOCYTES 10 5 - 13 % EOSINOPHILS 2 0 - 7 % BASOPHILS 0 0 - 1 % IMMATURE GRANULOCYTES 0 0.0 - 0.5 % ABS. NEUTROPHILS 2.9 1.8 - 8.0 K/UL  
 ABS. LYMPHOCYTES 1.1 0.8 - 3.5 K/UL  
 ABS. MONOCYTES 0.4 0.0 - 1.0 K/UL  
 ABS. EOSINOPHILS 0.1 0.0 - 0.4 K/UL  
 ABS. BASOPHILS 0.0 0.0 - 0.1 K/UL  
 ABS. IMM. GRANS. 0.0 0.00 - 0.04 K/UL  
 DF AUTOMATED METABOLIC PANEL, COMPREHENSIVE Collection Time: 12/03/19  3:28 PM  
Result Value Ref Range Sodium 142 136 - 145 mmol/L Potassium 3.7 3.5 - 5.1 mmol/L Chloride 106 97 - 108 mmol/L  
 CO2 25 21 - 32 mmol/L Anion gap 11 5 - 15 mmol/L Glucose 92 65 - 100 mg/dL BUN 26 (H) 6 - 20 MG/DL Creatinine 1.36 (H) 0.70 - 1.30 MG/DL  
 BUN/Creatinine ratio 19 12 - 20 GFR est AA >60 >60 ml/min/1.73m2 GFR est non-AA 58 (L) >60 ml/min/1.73m2  Calcium 8.7 8.5 - 10.1 MG/DL  
 Bilirubin, total 1.0 0.2 - 1.0 MG/DL  
 ALT (SGPT) 16 12 - 78 U/L  
 AST (SGOT) 16 15 - 37 U/L Alk. phosphatase 131 (H) 45 - 117 U/L Protein, total 6.6 6.4 - 8.2 g/dL Albumin 2.9 (L) 3.5 - 5.0 g/dL Globulin 3.7 2.0 - 4.0 g/dL A-G Ratio 0.8 (L) 1.1 - 2.2 LIPASE Collection Time: 12/03/19  3:28 PM  
Result Value Ref Range Lipase 262 73 - 393 U/L  
TROPONIN I Collection Time: 12/03/19  3:28 PM  
Result Value Ref Range Troponin-I, Qt. <0.05 <0.05 ng/mL MAGNESIUM Collection Time: 12/03/19  3:28 PM  
Result Value Ref Range Magnesium 2.1 1.6 - 2.4 mg/dL PHOSPHORUS Collection Time: 12/03/19  3:28 PM  
Result Value Ref Range Phosphorus 4.9 (H) 2.6 - 4.7 MG/DL PROTHROMBIN TIME + INR Collection Time: 12/03/19  3:28 PM  
Result Value Ref Range INR 1.3 (H) 0.9 - 1.1 Prothrombin time 12.3 (H) 9.0 - 11.1 sec LACTIC ACID Collection Time: 12/03/19  3:28 PM  
Result Value Ref Range Lactic acid 1.4 0.4 - 2.0 MMOL/L  
TSH 3RD GENERATION Collection Time: 12/03/19  3:28 PM  
Result Value Ref Range TSH 4.57 (H) 0.36 - 3.74 uIU/mL NT-PRO BNP Collection Time: 12/03/19  3:28 PM  
Result Value Ref Range NT pro-BNP 9,576 (H) 0 - 125 PG/ML  
URINALYSIS W/ RFLX MICROSCOPIC Collection Time: 12/03/19  4:44 PM  
Result Value Ref Range Color DARK YELLOW Appearance CLEAR CLEAR Specific gravity 1.033 (H) 1.003 - 1.030    
 pH (UA) 5.0 5.0 - 8.0 Protein 300 (A) NEG mg/dL Glucose NEGATIVE  NEG mg/dL Ketone NEGATIVE  NEG mg/dL Bilirubin NEGATIVE  NEG Blood SMALL (A) NEG Urobilinogen 4.0 (H) 0.2 - 1.0 EU/dL Nitrites NEGATIVE  NEG Leukocyte Esterase NEGATIVE  NEG    
URINE MICROSCOPIC ONLY Collection Time: 12/03/19  4:44 PM  
Result Value Ref Range WBC 0-4 0 - 4 /hpf  
 RBC 0-5 0 - 5 /hpf Epithelial cells FEW FEW /lpf  Bacteria NEGATIVE  NEG /hpf

## 2019-12-04 NOTE — PROGRESS NOTES
Hospitalist Progress Note NAME: Gareth Hairston :  1980 MRN:  [id number] Admit date: 12/3/2019 Today's date: 19 PCP: Corbin Mitchell M.D. Cell [phone] Assessment / Plan: 
 
Possible acute left CHF POA 
COPD POA 
-Admitted with increasing DANIELSON, denies orthopnea 
     pBNP 9576 
-CXR read as hyperinflated lungs, no ASD or edema 
-Not currently wheezing Echocardiogram 
Cardiology consultation 
-- IV lasix -- coreg added by cards  Daily weight 
-- PRN nebs 
-- d/w patient importance of quitting smoking DM type 2 POA 
-- not currently on home medications Start patient sliding scale with insulin -- Check HgBa1c 
 
? Left leg cellulitis vs venous stasis changes POA History of diabetic ulcers, s/p right 3rd toe amputation No significant PVD based on my exam and PVRs unless distal disease 
-- distal pulses 2+ in both legs, DARIO 1.14and 1.25, ? May have distal disease vascular surgery consultation ordered, doubt anything to do 
      ED at OSH told patient he might need angioplasty  Continue wound care 
-- IV vanco and ceftriaxone, PO antibiotics at discharge Tobacco use 1 PPD x 20 years CXR with hyperinflation Quit 1 month ago, stressed importance of quitting with patient Code Status: Full Surrogate Decision Maker:Yasemin Keene 
  
DVT Prophylaxis: Lovenox GI Prophylaxis: not indicated Subjective: Chief Complaint / Reason for Physician Visit \"I get short of breath when I walk\". Discussed with RN events overnight. Not SOB at rest, increased DANIELSON walking 10-20 feet No CP Denies fevers or pain in legs Review of Systems: 
Symptom Y/N Comments  Symptom Y/N Comments Fever/Chills    Chest Pain Poor Appetite    Edema Cough    Abdominal Pain Sputum    Joint Pain SOB/DANIELSON    Headache Nausea/vomit    Tolerating PT/OT Diarrhea    Tolerating Diet Constipation    Other Could NOT obtain due to:   
 
Objective: VITALS:  
Last 24hrs VS reviewed since prior progress note. Most recent are: 
Patient Vitals for the past 24 hrs: 
 Temp Pulse Resp BP SpO2  
12/04/19 1225    (!) 142/103   
12/04/19 1137 98.2 °F (36.8 °C) (!) 106 18 (!) 142/103 98 % 12/04/19 0800 98.1 °F (36.7 °C) 94 18 (!) 138/96 99 % 12/04/19 0327 97.9 °F (36.6 °C) 91 18 136/79 97 % 12/03/19 2245 98.4 °F (36.9 °C) 93 18 (!) 152/96 100 % Intake/Output Summary (Last 24 hours) at 12/4/2019 1507 Last data filed at 12/4/2019 5638 Gross per 24 hour Intake  Output 975 ml Net -975 ml Wt Readings from Last 12 Encounters:  
12/04/19 91.6 kg (202 lb) 12/03/19 94.3 kg (208 lb)  
01/12/15 90.7 kg (200 lb) 12/01/14 90.7 kg (200 lb) 11/03/14 90.7 kg (200 lb) 10/01/14 90.7 kg (200 lb) 09/03/14 85.7 kg (189 lb)  
07/28/14 85.7 kg (189 lb) 07/07/14 85.7 kg (189 lb)  
06/16/14 85.7 kg (189 lb) 06/02/14 85.7 kg (189 lb) 04/21/14 85.7 kg (189 lb) PHYSICAL EXAM: 
General: WD, WN. Alert, cooperative, no acute distress EENT:  PERRL. Anicteric sclerae. MMM Neck:  No meningismus, no thyromegaly Resp:  CTA bilaterally, no wheezing or rales. No accessory muscle use CV:  Regular  rhythm,  No edema GI:  Soft, Non distended, Non tender. +Bowel sounds, no rebound LN:  No cervical or inguinal ISMAEL Neurologic:  Alert and oriented X 3, normal speech, non focal motor exam 
Psych:   Good insight. Not anxious nor agitated Skin:  Erythema, mild warmth L > R calf 2 cm diabetic foot ulcer plantar right foot. No jaundice Reviewed most current lab test results and cultures  YES Reviewed most current radiology test results   YES Review and summation of old records today    NO Reviewed patient's current orders and MAR    YES 
PMH/SH reviewed - no change compared to H&P 
________________________________________________________________________ Care Plan discussed with: 
  Comments Patient x Family RN x Care Manager Consultant Multidiciplinary team rounds were held today with , nursing, pharmacist and clinical coordinator. Patient's plan of care was discussed; medications were reviewed and discharge planning was addressed. ________________________________________________________________________ Comments >50% of visit spent in counseling and coordination of care    
________________________________________________________________________ Harini Horton MD  
 
Procedures: see electronic medical records for all procedures/Xrays and details which were not copied into this note but were reviewed prior to creation of Plan. LABS: 
I reviewed today's most current labs and imaging studies. Pertinent labs include: 
Recent Labs 12/04/19 
0214 12/03/19 
1528 WBC 4.7 4.5 HGB 10.6* 11.8* HCT 35.4* 40.1  191 Recent Labs 12/04/19 
0214 12/03/19 
1528  142  
K 3.4* 3.7 * 106 CO2 26 25 GLU 70 92 BUN 25* 26* CREA 1.27 1.36* CA 8.3* 8.7 MG  --  2.1 PHOS  --  4.9* ALB  --  2.9* TBILI  --  1.0 SGOT  --  16 ALT  --  16 INR  --  1.3*

## 2019-12-04 NOTE — CDMP QUERY
Dr Rozina Tsai; Pt admitted with SOB & LE edema and has Left CHF documented. Please further specify type and acuity of CHF in the medical record. ? Acute on Chronic Systolic CHF ? Acute on Chronic Diastolic CHF ? Acute on Chronic Systolic and Diastolic CHF ? Acute Systolic CHF ? Acute Diastolic CHF ? Acute Systolic and Diastolic CHF 
? Other, please specify ? Clinically unable to determine The medical record reflects the following: 
  Risk Factors: noncompliance, DM, HTN, smoker Clinical Indicators: can walk ~10 yards before DANIELSON, elevated NTpBNP 9K, current Echo . LVEF 16-20%, LE edema extending up to the abdomen. Treatment: jorge, coreg, cardiology consult, strict I&O, daily wgt. Thank you,  
Zee Alejandraort, [employer], [address] [facility], [address] [place] [address]  
[phone]

## 2019-12-04 NOTE — ROUTINE PROCESS
The following appointments have been successfully scheduled: 
 
Date/time Tuesday, December 10, 2019 02:00 PM 
Patient  Jhonathan Ferrum 1980 ([address] [phone] Department [facility] OFFICE Appointment type New Patient Provider Burke Hugo

## 2019-12-04 NOTE — PROGRESS NOTES
0700: Kena RN (oncoming nurse) received bedside report from Rafaela Live [facility] (offgoing nurse).

## 2019-12-04 NOTE — PROGRESS NOTES
Primary Nurse Tiffany Beckett and KEYANA Salinas performed a dual skin assessment on this patient Impairment noted- see wound doc flow sheet Shukri score is 13. Patient has a left foot anterior ulcer that is pink/red, with yellow edges, no drainage noted. Left shin, and outer lateral left leg with ulcerations that are pink/red, with yellow edges, no drainage noted. Lower extremities are red bilaterally and cool to touch. Right 2nd toe amputation noted. Right dorsal ulceration with no drainage noted. Right great toe dried blood noted around edge of nail bed. Right top of great toe ulceration noted. No drainage noted. Right dorsal foot below great toe ulceration noted. No drainage noted.

## 2019-12-04 NOTE — PROGRESS NOTES
Problem: Patient Education: Go to Patient Education Activity Goal: Patient/Family Education Outcome: Progressing Towards Goal 
  
Problem: Heart Failure: Day 1 Goal: Off Pathway (Use only if patient is Off Pathway) Outcome: Progressing Towards Goal 
Goal: Diagnostic Test/Procedures Outcome: Progressing Towards Goal 
Goal: Nutrition/Diet Outcome: Progressing Towards Goal 
Goal: Discharge Planning Outcome: Progressing Towards Goal 
Goal: Medications Outcome: Progressing Towards Goal 
Goal: Respiratory Outcome: Progressing Towards Goal 
Goal: Psychosocial 
Outcome: Progressing Towards Goal 
Goal: *Oxygen saturation within defined limits Outcome: Progressing Towards Goal 
Goal: *Optimal pain control at patient's stated goal 
Outcome: Progressing Towards Goal

## 2019-12-04 NOTE — PROGRESS NOTES
Care Management: 
 
JAMI:  
Anticipate home with his sister and father, sister will transport. Follow up with doctor , he has an appointment with  [facility]. Medicaid applied for 12/4 and is pending. ? [facility] needs,  [facility] covers the [place] area. May need assist with medication costs at discharge. Patient lives with his sister and father in a mobile home. He says he usually works on [employer] but has not been able to lately because he is too weak. Reason for Admission:   CHF and bilateral  foot ulcers. Hx of DM. RRAT Score:   8 Patient does not have a PCP and our specialist have gotten him [facility]. Med Assist saw him today and have applied for Medicaid for him. Plan for utilizing home health:   Anticipate HH needs and I have contacted [facility] to see if they cover the area he lives in. His street address is [address] [place] [address], this is the [place]  area. Current Advanced Directive/Advance Care Plan:  
                     Full Code His sister Bella Heart is his NOK. Patient has no insurance and no PCP. He does not go to the doctors because of the costs. With Medicaid pending this should help him be more compliant with his medical regime. He may need some assistance with his discharge med's while waiting for his medicaid to be approved. He uses a small Pharmacy in [facility] called [facility] or [facility] ( usually in [place] ) Care Management Interventions PCP Verified by CM: No 
Palliative Care Criteria Met (RRAT>21 & CHF Dx)?: No 
Mode of Transport at Discharge: Other (see comment)(Sister Bella Heart) Transition of Care Consult (CM Consult): Home Health(Anticipate HH needs at discharge. Will see if [facility] covers the area he lives in. ) Current Support Network: Relative's Home(He lives in a trailer with his sister and his father. He does not drive. he is independent with ADL's. There is a walker at the trailer. ) Confirm Follow Up Transport: Family Plan discussed with Pt/Family/Caregiver: Yes(Met with patient at bedside and spoke with his sister Grecia Murphy on the phone. ) Discharge Location Discharge Placement: Home with home health(Anticipate HH needs at discharge. ) Jamison Hannah RN ACM [id number] Addendum: [facility] does cover the [place] area .

## 2019-12-04 NOTE — CONSULTS
[address] [place] [address]  [phone] [address] [facility] Date of  Admission: 12/3/2019 11:08 PM  
 
Admission type:Urgent Consult for: CHF Consult by: hospitalist  
 
 Subjective:  
 
Lori Adhikari is a 44 y.o. male with PMH DM who was admitted for CHF (congestive heart failure) ([facility] [I50.9]. Per ED provider note Lori Adhikari presented to the ED with c/o SOB and edema for several weeks. Cardiology consulted for CHF. On assessment, Lori Adhikari endorses the above. He states for several weeks he's been having leg swelling and DANIELSON. The leg swelling was more severe, but about 3 weeks ago he cut out extra salt, which decreased the swelling. He's noticed swelling all the way up to his abdomen. He can walk ~10 yards before [address]. He denies orthopnea, palpitations, chest pain. He states his mother and his grandfather both  from fluid from their hearts, so he's worried that he has that. He did recently stop smoking. Lori Adhikari  Does not follow with a cardiologist. No prior testing in our system. Cardiac risk factors: family history, diabetes mellitus, male gender, hypertension. Patient Active Problem List  
 Diagnosis Date Noted  Hypertension 2019  CHF (congestive heart failure) ([facility] 2019  Non-healing ulcer of foot ([facility] 10/20/2013 None Past Medical History:  
Diagnosis Date  Diabetes ([facility] Social History Socioeconomic History  Marital status: SINGLE Spouse name: Not on file  Number of children: Not on file  Years of education: Not on file  Highest education level: Not on file Tobacco Use  Smoking status: Former Smoker Last attempt to quit: 2019 Years since quittin.0  Smokeless tobacco: Never Used Substance and Sexual Activity  Alcohol use: No  
 
No Known Allergies Family History Problem Relation Age of Onset  Heart Disease Maternal Grandmother  Cancer Paternal Grandmother   
     Juan José Driscoll  Stroke Paternal Grandfather Current Facility-Administered Medications Medication Dose Route Frequency  sodium chloride (NS) flush 5-40 mL  5-40 mL IntraVENous Q8H  
 sodium chloride (NS) flush 5-40 mL  5-40 mL IntraVENous PRN  
 acetaminophen (TYLENOL) tablet 650 mg  650 mg Oral Q6H PRN  
 ondansetron (ZOFRAN) injection 4 mg  4 mg IntraVENous Q6H PRN  
 bisacodyl (DULCOLAX) tablet 5 mg  5 mg Oral DAILY PRN  
 enoxaparin (LOVENOX) injection 40 mg  40 mg SubCUTAneous Q24H  cefTRIAXone (ROCEPHIN) 1 g in 0.9% sodium chloride (MBP/ADV) 50 mL  1 g IntraVENous Q24H  
 insulin lispro (HUMALOG) injection   SubCUTAneous AC&HS  
 glucose chewable tablet 16 g  4 Tab Oral PRN  
 dextrose (D50) infusion 12.5-25 g  12.5-25 g IntraVENous PRN  
 glucagon (GLUCAGEN) injection 1 mg  1 mg IntraMUSCular PRN  
 furosemide (LASIX) injection 40 mg  40 mg IntraVENous BID  albuterol-ipratropium (DUO-NEB) 2.5 MG-0.5 MG/3 ML  3 mL Nebulization Q4H PRN  
 influenza vaccine 2019-20 (6 mos+)(PF) (FLUARIX/FLULAVAL/FLUZONE QUAD) injection 0.5 mL  0.5 mL IntraMUSCular PRIOR TO DISCHARGE Review of Symptoms:  
11 systems reviewed, negative other than as stated in the HPI Objective:  
  
Visit Vitals BP (!) 142/103 Pulse (!) 106 Temp 98.2 °F (36.8 °C) Resp 18 Ht 6' 1\" (1.854 m) Wt 91.6 kg (202 lb) SpO2 98% BMI 26.65 kg/m² Physical:  
General:  Pleasant  male sitting in chair in NAD Heart: RRR, no m/S3/JVD, no carotid bruits Lungs: clear Abdomen: Soft, +BS, NTND Extremities: weak distal pulses; 1-2+ pitting edema BLE with thickened skin changes Neurologic: Grossly normal 
 
Data Review:  
Recent Labs 12/04/19 
0214 12/03/19 
1528 WBC 4.7 4.5 HGB 10.6* 11.8* HCT 35.4* 40.1  191 Recent Labs 12/04/19 
0214 12/03/19 
1528  142  
K 3.4* 3.7 * 106 CO2 26 25 GLU 70 92 BUN 25* 26* CREA 1.27 1.36* CA 8.3* 8.7 MG  --  2.1 PHOS  --  4.9* ALB  --  2.9* TBILI  --  1.0 SGOT  --  16 ALT  --  16 INR  --  1.3* Recent Labs 12/04/19 
0759 12/04/19 
0214 12/03/19 
1528 TROIQ <0.05 <0.05 <0.05 Intake/Output Summary (Last 24 hours) at 12/4/2019 1449 Last data filed at 12/4/2019 1527 Gross per 24 hour Intake  Output 975 ml Net -975 ml Cardiographics Telemetry: ST with rare PVC 
ECG: ST 
Echocardiogram: ST 
CXRAY: \"Evidence of pulmonary hyperaeration. \" Assessment:  
  
 Active Problems: 
  CHF (congestive heart failure) ([facility] (12/3/2019) Hypertension (12/4/2019) Plan:  
 
Tessy Roger is a 44 y.o. male who presented to the ED with c/o SOB and leg swelling and admitted for suspected heart failure. CXR with hyperaeration and no signs of edema. Troponin negative; proBNP 9576. · ECHO pending · Continue diuresis · Will add a BB for HTN and tachycardia · See that vascular has been consulted for possible cellulitis and PVD Thank you for consulting [facility] Muriel Ryan, ZAINA 
DNP, RN, [facility] Patient seen and examined by me with nurse practitioner. I personally performed all components of the history, physical, and medical decision making and agree with the assessment and plan as noted. Further recommendations based upon Echo findings. Good clinical response to diuresis.   
 
Kat Shoemaker MD

## 2019-12-05 PROBLEM — I50.82 BIVENTRICULAR FAILURE (HCC): Status: ACTIVE | Noted: 2019-12-03

## 2019-12-05 PROBLEM — I50.21 ACUTE SYSTOLIC CONGESTIVE HEART FAILURE (HCC): Status: ACTIVE | Noted: 2019-12-03

## 2019-12-05 LAB
ANION GAP SERPL CALC-SCNC: 8 MMOL/L (ref 5–15)
BACTERIA SPEC CULT: NORMAL
BACTERIA SPEC CULT: NORMAL
BUN SERPL-MCNC: 23 MG/DL (ref 6–20)
BUN/CREAT SERPL: 19 (ref 12–20)
CALCIUM SERPL-MCNC: 7.9 MG/DL (ref 8.5–10.1)
CHLORIDE SERPL-SCNC: 108 MMOL/L (ref 97–108)
CHOLEST SERPL-MCNC: 111 MG/DL
CO2 SERPL-SCNC: 24 MMOL/L (ref 21–32)
CREAT SERPL-MCNC: 1.24 MG/DL (ref 0.7–1.3)
ERYTHROCYTE [DISTWIDTH] IN BLOOD BY AUTOMATED COUNT: 17.8 % (ref 11.5–14.5)
EST. AVERAGE GLUCOSE BLD GHB EST-MCNC: 128 MG/DL
GLUCOSE BLD STRIP.AUTO-MCNC: 111 MG/DL (ref 65–100)
GLUCOSE BLD STRIP.AUTO-MCNC: 111 MG/DL (ref 65–100)
GLUCOSE BLD STRIP.AUTO-MCNC: 159 MG/DL (ref 65–100)
GLUCOSE BLD STRIP.AUTO-MCNC: 88 MG/DL (ref 65–100)
GLUCOSE SERPL-MCNC: 107 MG/DL (ref 65–100)
HBA1C MFR BLD: 6.1 % (ref 4–5.6)
HCT VFR BLD AUTO: 35.2 % (ref 36.6–50.3)
HDLC SERPL-MCNC: 28 MG/DL
HDLC SERPL: 4 {RATIO} (ref 0–5)
HGB BLD-MCNC: 10.7 G/DL (ref 12.1–17)
LDLC SERPL CALC-MCNC: 69.4 MG/DL (ref 0–100)
LIPID PROFILE,FLP: NORMAL
MCH RBC QN AUTO: 23.3 PG (ref 26–34)
MCHC RBC AUTO-ENTMCNC: 30.4 G/DL (ref 30–36.5)
MCV RBC AUTO: 76.7 FL (ref 80–99)
NRBC # BLD: 0 K/UL (ref 0–0.01)
NRBC BLD-RTO: 0 PER 100 WBC
PLATELET # BLD AUTO: 191 K/UL (ref 150–400)
PMV BLD AUTO: 11 FL (ref 8.9–12.9)
POTASSIUM SERPL-SCNC: 3.3 MMOL/L (ref 3.5–5.1)
RBC # BLD AUTO: 4.59 M/UL (ref 4.1–5.7)
SERVICE CMNT-IMP: ABNORMAL
SERVICE CMNT-IMP: NORMAL
SERVICE CMNT-IMP: NORMAL
SODIUM SERPL-SCNC: 140 MMOL/L (ref 136–145)
TRIGL SERPL-MCNC: 68 MG/DL (ref ?–150)
VLDLC SERPL CALC-MCNC: 13.6 MG/DL
WBC # BLD AUTO: 5.3 K/UL (ref 4.1–11.1)

## 2019-12-05 PROCEDURE — 74011250637 HC RX REV CODE- 250/637: Performed by: INTERNAL MEDICINE

## 2019-12-05 PROCEDURE — 77030020291 HC FLEXSEAL FMS BMS -C

## 2019-12-05 PROCEDURE — 74011000250 HC RX REV CODE- 250: Performed by: INTERNAL MEDICINE

## 2019-12-05 PROCEDURE — 74011250636 HC RX REV CODE- 250/636: Performed by: INTERNAL MEDICINE

## 2019-12-05 PROCEDURE — 80061 LIPID PANEL: CPT

## 2019-12-05 PROCEDURE — 83036 HEMOGLOBIN GLYCOSYLATED A1C: CPT

## 2019-12-05 PROCEDURE — 36415 COLL VENOUS BLD VENIPUNCTURE: CPT

## 2019-12-05 PROCEDURE — 65660000000 HC RM CCU STEPDOWN

## 2019-12-05 PROCEDURE — 74011250637 HC RX REV CODE- 250/637: Performed by: NURSE PRACTITIONER

## 2019-12-05 PROCEDURE — 85027 COMPLETE CBC AUTOMATED: CPT

## 2019-12-05 PROCEDURE — 82962 GLUCOSE BLOOD TEST: CPT

## 2019-12-05 PROCEDURE — 74011000258 HC RX REV CODE- 258: Performed by: INTERNAL MEDICINE

## 2019-12-05 PROCEDURE — 80048 BASIC METABOLIC PNL TOTAL CA: CPT

## 2019-12-05 PROCEDURE — 94760 N-INVAS EAR/PLS OXIMETRY 1: CPT

## 2019-12-05 RX ORDER — POTASSIUM CHLORIDE 20 MEQ/1
40 TABLET, EXTENDED RELEASE ORAL 2 TIMES DAILY
Status: DISPENSED | OUTPATIENT
Start: 2019-12-05 | End: 2019-12-06

## 2019-12-05 RX ORDER — HYDRALAZINE HYDROCHLORIDE 25 MG/1
25 TABLET, FILM COATED ORAL 3 TIMES DAILY
Status: DISCONTINUED | OUTPATIENT
Start: 2019-12-05 | End: 2019-12-05

## 2019-12-05 RX ORDER — CARVEDILOL 6.25 MG/1
6.25 TABLET ORAL 2 TIMES DAILY
Status: DISCONTINUED | OUTPATIENT
Start: 2019-12-05 | End: 2019-12-06

## 2019-12-05 RX ORDER — LOSARTAN POTASSIUM 25 MG/1
25 TABLET ORAL DAILY
Status: DISCONTINUED | OUTPATIENT
Start: 2019-12-05 | End: 2019-12-11 | Stop reason: HOSPADM

## 2019-12-05 RX ADMIN — FUROSEMIDE 40 MG: 10 INJECTION, SOLUTION INTRAMUSCULAR; INTRAVENOUS at 17:46

## 2019-12-05 RX ADMIN — CARVEDILOL 6.25 MG: 6.25 TABLET, FILM COATED ORAL at 17:46

## 2019-12-05 RX ADMIN — Medication 10 ML: at 14:00

## 2019-12-05 RX ADMIN — POTASSIUM CHLORIDE 40 MEQ: 20 TABLET, EXTENDED RELEASE ORAL at 09:22

## 2019-12-05 RX ADMIN — Medication 10 ML: at 05:25

## 2019-12-05 RX ADMIN — LOSARTAN POTASSIUM 25 MG: 25 TABLET, FILM COATED ORAL at 12:26

## 2019-12-05 RX ADMIN — CARVEDILOL 3.12 MG: 3.12 TABLET, FILM COATED ORAL at 09:21

## 2019-12-05 RX ADMIN — ENOXAPARIN SODIUM 40 MG: 40 INJECTION SUBCUTANEOUS at 00:24

## 2019-12-05 RX ADMIN — CEFTRIAXONE 1 G: 1 INJECTION, POWDER, FOR SOLUTION INTRAMUSCULAR; INTRAVENOUS at 06:37

## 2019-12-05 RX ADMIN — POLYMYXIN B SULFATE, BACITRACIN ZINC, NEOMYCIN SULFATE: 5000; 3.5; 4 OINTMENT TOPICAL at 09:00

## 2019-12-05 RX ADMIN — Medication 10 ML: at 21:20

## 2019-12-05 RX ADMIN — POTASSIUM CHLORIDE 40 MEQ: 20 TABLET, EXTENDED RELEASE ORAL at 17:46

## 2019-12-05 RX ADMIN — FUROSEMIDE 40 MG: 10 INJECTION, SOLUTION INTRAMUSCULAR; INTRAVENOUS at 09:21

## 2019-12-05 NOTE — PROGRESS NOTES
Bedside shift change report GIVEN TO KEYANA Morales. Report included the following information SBAR. SIGNIFICANT CHANGES DURING SHIFT:   
 
 
 
CONCERNS TO ADDRESS WITH MD:   
 
 
 
 
 
[facility] NURSING NOTE Admission Date 12/3/2019 Admission Diagnosis CHF (congestive heart failure) ([facility] [I50.9] Consults IP CONSULT TO CARDIOLOGY 
IP CONSULT TO VASCULAR SURGERY Cardiac Monitoring [x] Yes [] No  
  
Purposeful Hourly Rounding [x] Yes   
Marah Score Total Score: 1 Marah score 3 or > [] Bed Alarm [] Avasys [] 1:1 sitter [] Patient refused (Signed refusal form in chart) Shukri Score Shukri Score: 16 Shukri score 14 or < [] PMT consult [] Wound Care consult  
 []  Specialty bed  [] Nutrition consult Influenza Vaccine Received Flu Vaccine for Current Season (usually Sept-March): No  
 Patient/Guardian Refused (Notify MD): No  
  
Oxygen needs? [x] Room air Oxygen @  []1L    []2L    []3L   []4L    []5L   []6L via NC Chronic home O2 use? [] Yes [] No 
Perform O2 challenge test and document in progress note using smartLastlinee (.Homeoxygen) Last bowel movement Last Bowel Movement Date: 12/04/19 Urinary Catheter LDAs Peripheral IV 12/04/19 Anterior; Left Wrist (Active) Site Assessment Clean, dry, & intact 12/5/2019  3:41 AM  
Phlebitis Assessment 0 12/5/2019  3:41 AM  
Infiltration Assessment 0 12/5/2019  3:41 AM  
Dressing Status Clean, dry, & intact 12/5/2019  3:41 AM  
Dressing Type Transparent 12/5/2019  3:41 AM  
Hub Color/Line Status Green 12/5/2019  3:41 AM  
                  
  
Readmission Risk Assessment Tool Score Low Risk   
      
 4 Total Score   
  
 4 Pt. Coverage (Medicare=5 , Medicaid, or Self-Pay=4) Criteria that do not apply:  
 Has Seen PCP in Last 6 Months (Yes=3, No=0) . Living with Significant Other. Assisted Living. LTAC. SNF. or  
Rehab Patient Length of Stay (>5 days = 3) IP Visits Last 12 Months (1-3=4, 4=9, >4=11) Charlson Comorbidity Score (Age + Comorbid Conditions) Expected Length of Stay 2d 9h  
Actual Length of Stay 2

## 2019-12-05 NOTE — PROGRESS NOTES
Problem: Heart Failure: Day 2 Goal: Activity/Safety Outcome: Progressing Towards Goal 
Goal: Consults, if ordered Outcome: Progressing Towards Goal 
Goal: Diagnostic Test/Procedures Outcome: Progressing Towards Goal 
Goal: Nutrition/Diet Outcome: Progressing Towards Goal 
Goal: Respiratory Outcome: Progressing Towards Goal 
Goal: Treatments/Interventions/Procedures Outcome: Progressing Towards Goal 
Goal: Psychosocial 
Outcome: Progressing Towards Goal

## 2019-12-05 NOTE — PROGRESS NOTES
0700: Received bedside report from Lindle Sandifer, off going nurse. Assumed care of patient. Problem: Heart Failure: Day 3 Goal: Activity/Safety Outcome: Progressing Towards Goal 
Goal: Diagnostic Test/Procedures Outcome: Progressing Towards Goal 
Goal: Nutrition/Diet Outcome: Progressing Towards Goal 
Goal: Discharge Planning Outcome: Progressing Towards Goal 
Goal: Medications Outcome: Progressing Towards Goal 
Goal: Respiratory Outcome: Progressing Towards Goal 
Goal: Treatments/Interventions/Procedures Outcome: Progressing Towards Goal 
Goal: Psychosocial 
Outcome: Progressing Towards Goal 
  
1900: Bedside shift change report GIVEN TO Lindle Sandifer, RN. Report included the following information SBAR, Kardex, Intake/Output, MAR, Recent Results and Cardiac Rhythm NSR.  
 
 
 
SIGNIFICANT CHANGES DURING SHIFT:  Consent signed for cath tomorrow CONCERNS TO ADDRESS WITH MD:  n/a 
 
 
 
 
[facility] NURSING NOTE Admission Date 12/3/2019 Admission Diagnosis CHF (congestive heart failure) ([facility] [I50.9] Consults IP CONSULT TO CARDIOLOGY 
IP CONSULT TO VASCULAR SURGERY Cardiac Monitoring [x] Yes [] No  
  
Purposeful Hourly Rounding [x] Yes   
Marah Score Total Score: 2 Marah score 3 or > [] Bed Alarm [] Avasys [] 1:1 sitter [] Patient refused (Signed refusal form in chart) Shukri Score Shukri Score: 16 Shukri score 14 or < [] PMT consult [] Wound Care consult  
 []  Specialty bed  [] Nutrition consult Influenza Vaccine Received Flu Vaccine for Current Season (usually Sept-March): No  
 Patient/Guardian Refused (Notify MD): No  
  
Oxygen needs? [x] Room air Oxygen @  []1L    []2L    []3L   []4L    []5L   []6L via NC Chronic home O2 use? [] Yes [] No 
Perform O2 challenge test and document in progress note using smartphrase (.Homeoxygen) Last bowel movement Last Bowel Movement Date: 12/04/19 Urinary Catheter LDAs Peripheral IV 12/04/19 Anterior; Left Wrist (Active) Site Assessment Clean, dry, & intact 12/5/2019  4:28 PM  
Phlebitis Assessment 0 12/5/2019  4:28 PM  
Infiltration Assessment 0 12/5/2019  4:28 PM  
Dressing Status Clean, dry, & intact 12/5/2019  4:28 PM  
Dressing Type Transparent 12/5/2019  4:28 PM  
Hub Color/Line Status Green;Flushed;Patent 12/5/2019  4:28 PM  
                  
  
Readmission Risk Assessment Tool Score Low Risk   
      
 4 Total Score   
  
 4 Pt. Coverage (Medicare=5 , Medicaid, or Self-Pay=4) Criteria that do not apply:  
 Has Seen PCP in Last 6 Months (Yes=3, No=0) . Living with Significant Other. Assisted Living. LTAC. SNF. or  
Rehab Patient Length of Stay (>5 days = 3) IP Visits Last 12 Months (1-3=4, 4=9, >4=11) Charlson Comorbidity Score (Age + Comorbid Conditions) Expected Length of Stay 2d 9h  
Actual Length of Stay 2

## 2019-12-05 NOTE — PROGRESS NOTES
Normal vasc studies: pressures normal and good waveforms Might consider having patient seen and plugged in with Podiatrist while here

## 2019-12-05 NOTE — PROGRESS NOTES
Hospitalist Progress Note NAME: Iva Harper :  1980 MRN:  [id number] Admit date: 12/3/2019 Today's date: 19 PCP: None Nuvia Goncalves M.D. Cell [phone] Assessment / Plan: 
 
Acute systolic CHF POA LVEF 16 to 20% Essential HTN uncontrolled POA 
COPD POA Admitted with increasing [address], [address] CXR read as hyperinflated lungs, no ASD or edema, not currently wheezing May have some element of COPD, but DANIELSON mainly the heart Echocardiogram Severe systolic dysfunction LVEF 16 - 20%. Reduced RV function as well Edema improved with IV diuresis BP uncontrolled Increased coreg from 3.125 to 6.25 mg 
    Cards added Po losartan PRN NTG Suspect will need ischemic work up, await cards input Daily weight PRN nebs D/w patient importance of quitting smoking LDL 69 Hypokalemia K 3.2 KCL 40 meq x 2 today, repeat level in AM 
 
DM type 2 POA HgBa1c 6.1 Currently diet controlled Not currently on home medications Start patient sliding scale with insulin ? Left leg cellulitis vs venous stasis changes POA History of diabetic ulcers, s/p right 3rd toe amputation No significant PVD based on my exam and PVRs unless distal disease Distal pulses 2+ in both legs on my exam, DARIO 1.14and 1.25, ? May have distal disease Vascular surgery consultation saw, ? Venous with the edema Continue   Wound care IV ceftriaxone, PO antibiotics at discharge, seems improved, hold vanco 
Needs podiatry follow up as outpatient Tobacco use 1 PPD x 20 years CXR with hyperinflation Quit 1 month ago, stressed importance of quitting with patient Code Status: Full Surrogate Decision Maker:Yasemin Keene 
  
DVT Prophylaxis: Lovenox GI Prophylaxis: not indicated Subjective: Chief Complaint / Reason for Physician Visit \"my legs are definitely less swollen\". Discussed with RN events overnight. Less edematous, not SOB Denied CP 
 Echo LVEF 16 to 20%, d/w patient No CP Denies fevers or pain in legs Review of Systems: 
Symptom Y/N Comments  Symptom Y/N Comments Fever/Chills n   Chest Pain Poor Appetite    Edema less Cough n   Abdominal Pain Sputum    Joint Pain SOB/DANIELSON less   Headache Nausea/vomit n   Tolerating PT/OT Diarrhea n   Tolerating Diet y Constipation    Other Could NOT obtain due to:   
 
Objective: VITALS:  
Last 24hrs VS reviewed since prior progress note. Most recent are: 
Patient Vitals for the past 24 hrs: 
 Temp Pulse Resp BP SpO2  
12/05/19 0753 97.4 °F (36.3 °C) 90 18 (!) 149/100 100 % 12/05/19 0341 97.6 °F (36.4 °C) 86 18 (!) 140/97 100 % 12/05/19 0023 97.8 °F (36.6 °C) 85 18 129/89 95 % 12/04/19 1941 98.1 °F (36.7 °C) 88 18 121/87 100 % 12/04/19 1538 98.1 °F (36.7 °C) 100 18 (!) 130/98 98 % 12/04/19 1225    (!) 142/103   
12/04/19 1137 98.2 °F (36.8 °C) (!) 106 18 (!) 142/103 98 % Intake/Output Summary (Last 24 hours) at 12/5/2019 1041 Last data filed at 12/5/2019 2438 Gross per 24 hour Intake 340 ml Output 3375 ml Net -3035 ml Wt Readings from Last 12 Encounters:  
12/05/19 89.4 kg (197 lb) 12/03/19 94.3 kg (208 lb)  
01/12/15 90.7 kg (200 lb) 12/01/14 90.7 kg (200 lb) 11/03/14 90.7 kg (200 lb) 10/01/14 90.7 kg (200 lb) 09/03/14 85.7 kg (189 lb)  
07/28/14 85.7 kg (189 lb) 07/07/14 85.7 kg (189 lb)  
06/16/14 85.7 kg (189 lb) 06/02/14 85.7 kg (189 lb) 04/21/14 85.7 kg (189 lb) PHYSICAL EXAM: 
General: WD, WN. Alert, cooperative, no acute distress EENT:  PERRL. Anicteric sclerae. MMM Resp:  Few crackles at bases, no wheezing or rales. No accessory muscle use CV:  Regular  rhythm, trace edema GI:  Soft, Non distended, Non tender. +Bowel sounds, no rebound LN:  No cervical or inguinal ISMAEL Neurologic:  Alert and oriented X 3, normal speech, non focal motor exam 
Psych:   Good insight. Not anxious nor agitated Skin:  Erythema, mild warmth L > R calf 2 cm diabetic foot ulcer plantar right foot. No jaundice All photos 12/3 Reviewed most current lab test results and cultures  YES Reviewed most current radiology test results   YES Review and summation of old records today    NO Reviewed patient's current orders and MAR    YES 
PMH/SH reviewed - no change compared to H&P 
________________________________________________________________________ Care Plan discussed with: 
  Comments Patient x Family RN x Care Manager Consultant  x Cardiology Multidiciplinary team rounds were held today with , nursing, pharmacist and clinical coordinator. Patient's plan of care was discussed; medications were reviewed and discharge planning was addressed. ________________________________________________________________________ Comments >50% of visit spent in counseling and coordination of care    
________________________________________________________________________ Oj Blas MD  
 
Procedures: see electronic medical records for all procedures/Xrays and details which were not copied into this note but were reviewed prior to creation of Plan. LABS: 
I reviewed today's most current labs and imaging studies. Pertinent labs include: 
Recent Labs 12/05/19 
0025 12/04/19 0214 12/03/19 
1528 WBC 5.3 4.7 4.5 HGB 10.7* 10.6* 11.8* HCT 35.2* 35.4* 40.1  191 191 Recent Labs 12/05/19 
0025 12/04/19 0214 12/03/19 
1528  142 142  
K 3.3* 3.4* 3.7  109* 106 CO2 24 26 25 * 70 92 BUN 23* 25* 26* CREA 1.24 1.27 1.36* CA 7.9* 8.3* 8.7 MG  --   --  2.1 PHOS  --   --  4.9* ALB  --   --  2.9* TBILI  --   --  1.0 SGOT  --   --  16 ALT  --   --  16 INR  --   --  1.3*

## 2019-12-05 NOTE — PROGRESS NOTES
[address] [place] [address], [address] [facility]  [phone] Cardiology Progress Note 
 
 
12/5/2019 10:30 AM 
 
Admit Date: 12/3/2019 Admit Diagnosis:  
CHF (congestive heart failure) ([facility] [I50.9] Interval History/Subjective:  
 
Neli Marquez is a 44 y.o. male with PMH DM who was admitted for CHF (congestive heart failure) ([facility] [I50.9]. -BP elevated 
-K 3.3 
-weight down 5#; I/O neg 3.5 
-Mr. Merrick Shirley is feeling well today. No current SOB or pain. He notices that his legs are less swollen. Visit Vitals /88 (BP 1 Location: Right arm, BP Patient Position: At rest) Pulse 87 Temp 97.6 °F (36.4 °C) Resp 18 Ht 6' 1\" (1.854 m) Wt 89.4 kg (197 lb) SpO2 99% BMI 25.99 kg/m² Current Facility-Administered Medications Medication Dose Route Frequency  potassium chloride (K-DUR, KLOR-CON) SR tablet 40 mEq  40 mEq Oral BID  losartan (COZAAR) tablet 25 mg  25 mg Oral DAILY  carvedilol (COREG) tablet 6.25 mg  6.25 mg Oral BID  nitroglycerin (NITROBID) 2 % ointment 1 Inch  1 Inch Topical Q6H PRN  
 sodium chloride (NS) flush 5-40 mL  5-40 mL IntraVENous Q8H  
 sodium chloride (NS) flush 5-40 mL  5-40 mL IntraVENous PRN  
 acetaminophen (TYLENOL) tablet 650 mg  650 mg Oral Q6H PRN  
 ondansetron (ZOFRAN) injection 4 mg  4 mg IntraVENous Q6H PRN  
 bisacodyl (DULCOLAX) tablet 5 mg  5 mg Oral DAILY PRN  
 enoxaparin (LOVENOX) injection 40 mg  40 mg SubCUTAneous Q24H  cefTRIAXone (ROCEPHIN) 1 g in 0.9% sodium chloride (MBP/ADV) 50 mL  1 g IntraVENous Q24H  
 insulin lispro (HUMALOG) injection   SubCUTAneous AC&HS  
 glucose chewable tablet 16 g  4 Tab Oral PRN  
 dextrose (D50) infusion 12.5-25 g  12.5-25 g IntraVENous PRN  
 glucagon (GLUCAGEN) injection 1 mg  1 mg IntraMUSCular PRN  
 furosemide (LASIX) injection 40 mg  40 mg IntraVENous BID  albuterol-ipratropium (DUO-NEB) 2.5 MG-0.5 MG/3 ML  3 mL Nebulization Q4H PRN  
  neomycin-bacitracin-polymyxin (NEOSPORIN) ointment   Topical DAILY  influenza vaccine 2019-20 (6 mos+)(PF) (FLUARIX/FLULAVAL/FLUZONE QUAD) injection 0.5 mL  0.5 mL IntraMUSCular PRIOR TO DISCHARGE Objective:  
  
Physical Exam: 
General:  Pleasant  male resting in bed in NAD Heart: RRR, no m/S3/JVD Lungs: clear Abdomen: Soft, +BS, NTND Extremities: weak distal pulses; trace to 1+ pitting edema BLE with thickened skin changes Neurologic: Grossly normal 
Skin:  Warm and dry. Data Review:  
Recent Labs 12/05/19 
0025 12/04/19 0214 12/03/19 
1528 WBC 5.3 4.7 4.5 HGB 10.7* 10.6* 11.8* HCT 35.2* 35.4* 40.1  191 191 Recent Labs 12/05/19 
0025 12/04/19 0214 12/03/19 
1528  142 142  
K 3.3* 3.4* 3.7  109* 106 CO2 24 26 25 * 70 92 BUN 23* 25* 26* CREA 1.24 1.27 1.36* CA 7.9* 8.3* 8.7 MG  --   --  2.1 PHOS  --   --  4.9* ALB  --   --  2.9* TBILI  --   --  1.0 SGOT  --   --  16 ALT  --   --  16 INR  --   --  1.3* Recent Labs 12/04/19 
0759 12/04/19 0214 12/03/19 
1528 TROIQ <0.05 <0.05 <0.05 Intake/Output Summary (Last 24 hours) at 12/5/2019 1136 Last data filed at 12/5/2019 0821 Gross per 24 hour Intake 340 ml Output 3375 ml Net -3035 ml  
  
  
Telemetry: Sr with rare PVC 
ECG: ST 
Echocardiogram: EF 16-20%; RV dilatation and decreased RV function; mod pHTN  
CXRAY: \"Evidence of pulmonary hyperaeration. \" Assessment:  
 
Active Problems: 
  Biventricular failure ([address] (12/3/2019) Hypertension (12/4/2019) Plan: BiV heart failure:  Symptoms improving. EF 16-20%; RV dilatation with decreased function and mod pulmonary HTN. · Continue IV diuresis today. Can likely transition to PO tomorrow · Continue BB (dose increased by hospitalist) · Added ARB this morning to allow for easier transition to entresto when patient has insurance coverage · Further evaluate for causes of cardiomyopathy with cardiac cath tomorrow. · Discussed increased risk of sudden cardiac death with patient due to his low EF. Unsure if patient will be able to get a Lifevest until his Medicaid is accepted, but patient is wanting to pursue if able. HTN:  BP still elevated this morning · BB increased · ARB added Cu[place]emoc [place] NP 
DNP, RN, [facility] Patient seen and examined by me with nurse practitioner. I personally performed all components of the history, physical, and medical decision making and agree with the assessment and plan as noted. For cardiac cath tomorrow. I discussed the risks/benefits/alternatives of the procedure with the patient. Risks include (but are not limited to) bleeding, infection, cva/mi/tamponade/death. The patient understands and agrees to proceed.  
 
Luke Mobley MD

## 2019-12-06 LAB
ANION GAP SERPL CALC-SCNC: 10 MMOL/L (ref 5–15)
BASOPHILS # BLD: 0 K/UL (ref 0–0.1)
BASOPHILS NFR BLD: 0 % (ref 0–1)
BUN SERPL-MCNC: 22 MG/DL (ref 6–20)
BUN/CREAT SERPL: 18 (ref 12–20)
CALCIUM SERPL-MCNC: 8.3 MG/DL (ref 8.5–10.1)
CHLORIDE SERPL-SCNC: 108 MMOL/L (ref 97–108)
CO2 SERPL-SCNC: 24 MMOL/L (ref 21–32)
CREAT SERPL-MCNC: 1.2 MG/DL (ref 0.7–1.3)
DIFFERENTIAL METHOD BLD: ABNORMAL
EOSINOPHIL # BLD: 0.1 K/UL (ref 0–0.4)
EOSINOPHIL NFR BLD: 3 % (ref 0–7)
ERYTHROCYTE [DISTWIDTH] IN BLOOD BY AUTOMATED COUNT: 17.7 % (ref 11.5–14.5)
GLUCOSE BLD STRIP.AUTO-MCNC: 167 MG/DL (ref 65–100)
GLUCOSE BLD STRIP.AUTO-MCNC: 87 MG/DL (ref 65–100)
GLUCOSE BLD STRIP.AUTO-MCNC: 88 MG/DL (ref 65–100)
GLUCOSE BLD STRIP.AUTO-MCNC: 97 MG/DL (ref 65–100)
GLUCOSE SERPL-MCNC: 94 MG/DL (ref 65–100)
HCT VFR BLD AUTO: 37 % (ref 36.6–50.3)
HGB BLD-MCNC: 11.3 G/DL (ref 12.1–17)
IMM GRANULOCYTES # BLD AUTO: 0 K/UL (ref 0–0.04)
IMM GRANULOCYTES NFR BLD AUTO: 0 % (ref 0–0.5)
LYMPHOCYTES # BLD: 1.4 K/UL (ref 0.8–3.5)
LYMPHOCYTES NFR BLD: 26 % (ref 12–49)
MCH RBC QN AUTO: 23.5 PG (ref 26–34)
MCHC RBC AUTO-ENTMCNC: 30.5 G/DL (ref 30–36.5)
MCV RBC AUTO: 76.9 FL (ref 80–99)
MONOCYTES # BLD: 0.3 K/UL (ref 0–1)
MONOCYTES NFR BLD: 7 % (ref 5–13)
NEUTS SEG # BLD: 3.3 K/UL (ref 1.8–8)
NEUTS SEG NFR BLD: 64 % (ref 32–75)
NRBC # BLD: 0 K/UL (ref 0–0.01)
NRBC BLD-RTO: 0 PER 100 WBC
PLATELET # BLD AUTO: 202 K/UL (ref 150–400)
PMV BLD AUTO: 11.1 FL (ref 8.9–12.9)
POTASSIUM SERPL-SCNC: 4 MMOL/L (ref 3.5–5.1)
RBC # BLD AUTO: 4.81 M/UL (ref 4.1–5.7)
SERVICE CMNT-IMP: ABNORMAL
SERVICE CMNT-IMP: NORMAL
SODIUM SERPL-SCNC: 142 MMOL/L (ref 136–145)
WBC # BLD AUTO: 5.2 K/UL (ref 4.1–11.1)

## 2019-12-06 PROCEDURE — 74011250637 HC RX REV CODE- 250/637: Performed by: NURSE PRACTITIONER

## 2019-12-06 PROCEDURE — 85025 COMPLETE CBC W/AUTO DIFF WBC: CPT

## 2019-12-06 PROCEDURE — 74011250636 HC RX REV CODE- 250/636: Performed by: INTERNAL MEDICINE

## 2019-12-06 PROCEDURE — 74011000258 HC RX REV CODE- 258: Performed by: INTERNAL MEDICINE

## 2019-12-06 PROCEDURE — 94760 N-INVAS EAR/PLS OXIMETRY 1: CPT

## 2019-12-06 PROCEDURE — 80048 BASIC METABOLIC PNL TOTAL CA: CPT

## 2019-12-06 PROCEDURE — 36415 COLL VENOUS BLD VENIPUNCTURE: CPT

## 2019-12-06 PROCEDURE — 74011000250 HC RX REV CODE- 250: Performed by: INTERNAL MEDICINE

## 2019-12-06 PROCEDURE — 65660000000 HC RM CCU STEPDOWN

## 2019-12-06 PROCEDURE — 74011250637 HC RX REV CODE- 250/637: Performed by: INTERNAL MEDICINE

## 2019-12-06 PROCEDURE — 82962 GLUCOSE BLOOD TEST: CPT

## 2019-12-06 RX ORDER — FUROSEMIDE 40 MG/1
40 TABLET ORAL DAILY
Status: DISCONTINUED | OUTPATIENT
Start: 2019-12-07 | End: 2019-12-07

## 2019-12-06 RX ORDER — CARVEDILOL 12.5 MG/1
12.5 TABLET ORAL 2 TIMES DAILY
Status: DISCONTINUED | OUTPATIENT
Start: 2019-12-06 | End: 2019-12-11 | Stop reason: HOSPADM

## 2019-12-06 RX ADMIN — Medication 10 ML: at 14:39

## 2019-12-06 RX ADMIN — LOSARTAN POTASSIUM 25 MG: 25 TABLET, FILM COATED ORAL at 08:00

## 2019-12-06 RX ADMIN — Medication 10 ML: at 06:08

## 2019-12-06 RX ADMIN — CARVEDILOL 6.25 MG: 6.25 TABLET, FILM COATED ORAL at 08:00

## 2019-12-06 RX ADMIN — POLYMYXIN B SULFATE, BACITRACIN ZINC, NEOMYCIN SULFATE: 5000; 3.5; 4 OINTMENT TOPICAL at 10:41

## 2019-12-06 RX ADMIN — CARVEDILOL 12.5 MG: 12.5 TABLET, FILM COATED ORAL at 17:43

## 2019-12-06 RX ADMIN — Medication 10 ML: at 22:34

## 2019-12-06 RX ADMIN — FUROSEMIDE 40 MG: 10 INJECTION, SOLUTION INTRAMUSCULAR; INTRAVENOUS at 11:51

## 2019-12-06 RX ADMIN — CEFTRIAXONE 1 G: 1 INJECTION, POWDER, FOR SOLUTION INTRAMUSCULAR; INTRAVENOUS at 06:06

## 2019-12-06 NOTE — PROGRESS NOTES
Problem: Falls - Risk of 
Goal: *Absence of Falls Description Document [place] Fall Risk and appropriate interventions in the flowsheet. Outcome: Progressing Towards Goal 
Note: Fall Risk Interventions: 
Mobility Interventions: Patient to call before getting OOB, Assess mobility with egress test, Communicate number of staff needed for ambulation/transfer Medication Interventions: Assess postural VS orthostatic hypotension, Evaluate medications/consider consulting pharmacy, Teach patient to arise slowly History of Falls Interventions: Door open when patient unattended, Vital signs minimum Q4HRs X 24 hrs (comment for end date), Assess for delayed presentation/identification of injury for 48 hrs (comment for end date) Problem: Patient Education: Go to Patient Education Activity Goal: Patient/Family Education Outcome: Progressing Towards Goal

## 2019-12-06 NOTE — PROGRESS NOTES
Bedside shift change report GIVEN TO Arnulfo Vidal RN. Report included the following information SBAR. SIGNIFICANT CHANGES DURING SHIFT:  No cath done today. Patient is to be NPO at midnight for cath tomorrow morning at 0800. Diastolic BP remains high, does not meet parameters for PRN nitro at this time. Carvedilol dose increased this evening. CONCERNS TO ADDRESS WITH MD:   
 
 
 
 
[facility] NURSING NOTE Admission Date 12/3/2019 Admission Diagnosis CHF (congestive heart failure) ([facility] [I50.9] Consults IP CONSULT TO CARDIOLOGY 
IP CONSULT TO VASCULAR SURGERY Cardiac Monitoring [x] Yes [] No  
  
Purposeful Hourly Rounding [x] Yes   
Marah Score Total Score: 2 Marah score 3 or > [] Bed Alarm [] Avasys [] 1:1 sitter [] Patient refused (Signed refusal form in chart) Shukri Score Shukri Score: 17 Shukri score 14 or < [] PMT consult [] Wound Care consult  
 []  Specialty bed  [] Nutrition consult Influenza Vaccine Received Flu Vaccine for Current Season (usually Sept-March): No  
 Patient/Guardian Refused (Notify MD): No  
  
Oxygen needs? [x] Room air Oxygen @  []1L    []2L    []3L   []4L    []5L   []6L via NC Chronic home O2 use? [] Yes [] No 
Perform O2 challenge test and document in progress note using smartphrase (.Homeoxygen) Last bowel movement Last Bowel Movement Date: 12/04/19 Urinary Catheter LDAs Peripheral IV 12/04/19 Anterior; Left Wrist (Active) Site Assessment Clean, dry, & intact 12/6/2019  7:39 AM  
Phlebitis Assessment 0 12/6/2019  7:39 AM  
Infiltration Assessment 0 12/6/2019  7:39 AM  
Dressing Status Clean, dry, & intact 12/6/2019  7:39 AM  
Dressing Type Transparent;Tape 12/6/2019  7:39 AM  
Hub Color/Line Status Green;Flushed 12/6/2019  7:39 AM  
                  
  
Readmission Risk Assessment Tool Score Low Risk   
      
 4 Total Score   
  
 4 Pt. Coverage (Medicare=5 , Medicaid, or Self-Pay=4) Criteria that do not apply:  
 Has Seen PCP in Last 6 Months (Yes=3, No=0) . Living with Significant Other. Assisted Living. LTAC. SNF. or  
Rehab Patient Length of Stay (>5 days = 3) IP Visits Last 12 Months (1-3=4, 4=9, >4=11) Charlson Comorbidity Score (Age + Comorbid Conditions) Expected Length of Stay 2d 9h  
Actual Length of Stay 3

## 2019-12-06 NOTE — PROGRESS NOTES
Physical Therapy PT eval order received. Pt seen by cardiology with cath planned today. Will follow tomorrow for eval as appropriate.  
 
Celi Almeida, PT

## 2019-12-06 NOTE — PROGRESS NOTES
Occupational Therapy OT eval order received. Chart reviewed. Pt seen by cardiology with cath planned today. Will follow tomorrow for eval as appropriate.  Aide Mix OTR/L

## 2019-12-06 NOTE — PROGRESS NOTES
Problem: Heart Failure: Day 3 Goal: Activity/Safety Outcome: Progressing Towards Goal 
Goal: Diagnostic Test/Procedures Outcome: Progressing Towards Goal 
Goal: Nutrition/Diet Outcome: Progressing Towards Goal 
Goal: Medications Outcome: Progressing Towards Goal 
Goal: Respiratory Outcome: Progressing Towards Goal 
Goal: *Oxygen saturation within defined limits Outcome: Progressing Towards Goal

## 2019-12-06 NOTE — PROGRESS NOTES
Bedside shift change report given to Alex Turner (oncoming nurse) by Rafaela Live (offgoing nurse). Report included the following information SBAR.  
 
5858 - Patient still to have cath, scheduled for 1115 
 
1110 - Cardiology aware of BP, continue meds. 1120 - Patient still to have cath today. 1553 - Patient still to have cath today. BP does not meet criteria for PRN medication. 1700 - Per cath lab, patient is still scheduled for today. 46 - Dr. Claudio Powell aware patient has not had cath yet. 2032 - Per Dr. Greg Sotomayor, can re-start diet and make NPO at midnight for cath tomorrow morning. Ordered dinner tray.

## 2019-12-06 NOTE — PROGRESS NOTES
JAMI: 
1. Home with family 2. New PCP - scheduled with [facility] 3. OP f/u with PCP and Cardiologist 
 
Consult acknowledged to check on eligibility for life vest. CM manager to call [employer] to verify. Pt currently Medicaid pending. Phineas Libman, MSW Care Manager [phone] 
 
 
UPDATE 4:59 PM 
Per conversation with NP Jacqueline Vega, life vest can be processed as OP. Will disregard consult.

## 2019-12-06 NOTE — PROGRESS NOTES
Hospitalist Progress Note NAME: Gutierrez Ortiz :  1980 MRN:  [id number] Admit date: 12/3/2019 Today's date: 19 PCP: None Rocael Vincent M.D. Cell [phone] Assessment / Plan: 
 
Acute systolic CHF POA LVEF 16 to 20% Essential HTN uncontrolled POA 
COPD POA Admitted with increasing [address] CXR read as hyperinflated lungs, no ASD or edema, not currently wheezing May have some element of COPD, but DANIELSON mainly the heart Echocardiogram Severe systolic dysfunction LVEF 16 - 20%. Reduced RV function as well Edema improved with IV diuresis, feels better BP still uncontrolled Increased coreg 12.5 today Continue losartan PRN NTG Cath today Daily weight PRN nebs D/w patient importance of quitting smoking again today LDL 69 Hypokalemia K 3.2 improved KCL 40 meq x 2 today, repeat level in AM 
Serial labs DM type 2 POA HgBa1c 6.1 Currently diet controlled Not currently on home medications Sliding scale with insulin ? Left leg cellulitis vs venous stasis changes POA History of diabetic ulcers, s/p right 3rd toe amputation No significant PVD based on my exam and PVRs unless distal disease Distal pulses 2+ in both legs on my exam, DARIO 1.14and 1.25, ? May have distal disease Vascular surgery consultation saw, ? Venous with the edema Continue   Wound care IV ceftriaxone, PO antibiotics at discharge, seems improved, hold vanco 
Needs podiatry follow up as outpatient, discussed with patient Tobacco use 1 PPD x 20 years CXR with hyperinflation Quit 1 month ago, stressed importance of quitting with patient Code Status: Full Surrogate Decision Maker:Yasemin Keene 
  
DVT Prophylaxis: Lovenox GI Prophylaxis: not indicated Subjective: Chief Complaint / Reason for Physician Visit \"I feel better. Discussed with RN events overnight. Less edematous, not SOB, feels weak and tired diffusely Denied CP Echo LVEF 16 to 20%, cath today No CP Review of Systems: 
Symptom Y/N Comments  Symptom Y/N Comments Fever/Chills n   Chest Pain Poor Appetite    Edema less Cough n   Abdominal Pain Sputum    Joint Pain SOB/DANIELSON y   Headache Nausea/vomit n   Tolerating PT/OT Diarrhea n   Tolerating Diet y Constipation    Other Could NOT obtain due to:   
 
Objective: VITALS:  
Last 24hrs VS reviewed since prior progress note. Most recent are: 
Patient Vitals for the past 24 hrs: 
 Temp Pulse Resp BP SpO2  
12/06/19 1428 97.2 °F (36.2 °C) 80 18 (!) 140/100 99 % 12/06/19 1300    (!) 146/105   
12/06/19 1139 98.2 °F (36.8 °C) 79 18 (!) 141/104 98 % 12/06/19 0721 97.6 °F (36.4 °C) 77 18 (!) 150/98 100 % 12/06/19 0334 97.4 °F (36.3 °C) 80 18 (!) 133/97 100 % 12/05/19 2331 97.7 °F (36.5 °C) 77 16 133/89 100 % 12/05/19 1942 98 °F (36.7 °C) 84 18 (!) 132/94 100 % 12/05/19 1517 97.3 °F (36.3 °C) 85 18 (!) 131/94 98 % Intake/Output Summary (Last 24 hours) at 12/6/2019 1455 Last data filed at 12/6/2019 6548 Gross per 24 hour Intake 240 ml Output 1925 ml Net -1685 ml Wt Readings from Last 12 Encounters:  
12/06/19 88.3 kg (194 lb 11.2 oz) 12/03/19 94.3 kg (208 lb)  
01/12/15 90.7 kg (200 lb) 12/01/14 90.7 kg (200 lb) 11/03/14 90.7 kg (200 lb) 10/01/14 90.7 kg (200 lb) 09/03/14 85.7 kg (189 lb)  
07/28/14 85.7 kg (189 lb) 07/07/14 85.7 kg (189 lb)  
06/16/14 85.7 kg (189 lb) 06/02/14 85.7 kg (189 lb) 04/21/14 85.7 kg (189 lb) PHYSICAL EXAM: 
General: WD, WN. Alert, cooperative, no acute distress EENT:  PERRL. Anicteric sclerae. MMM Resp:  Few crackles at bases, no wheezing or rales. No accessory muscle use CV:  Regular  rhythm, trace edema GI:  Soft, Non distended, Non tender. +Bowel sounds, no rebound Neurologic:  Alert and oriented X 3, normal speech, non focal motor exam 
Psych:   Good insight. Not anxious nor agitated Skin:  Erythema, mild warmth L > R calf 2 cm diabetic foot ulcer plantar right foot. No jaundice All photos 12/3 Reviewed most current lab test results and cultures  YES Reviewed most current radiology test results   YES Review and summation of old records today    NO Reviewed patient's current orders and MAR    YES 
PMH/SH reviewed - no change compared to H&P 
________________________________________________________________________ Care Plan discussed with: 
  Comments Patient x Family RN x Care Manager Consultant  x Cardiology Multidiciplinary team rounds were held today with , nursing, pharmacist and clinical coordinator. Patient's plan of care was discussed; medications were reviewed and discharge planning was addressed. ________________________________________________________________________ Comments >50% of visit spent in counseling and coordination of care    
________________________________________________________________________ Dillon Santos MD  
 
Procedures: see electronic medical records for all procedures/Xrays and details which were not copied into this note but were reviewed prior to creation of Plan. LABS: 
I reviewed today's most current labs and imaging studies. Pertinent labs include: 
Recent Labs 12/06/19 0138 12/05/19 
0025 12/04/19 
0214 WBC 5.2 5.3 4.7 HGB 11.3* 10.7* 10.6* HCT 37.0 35.2* 35.4*  
 191 191 Recent Labs 12/06/19 0138 12/05/19 
0025 12/04/19 
0214 12/03/19 
1528  140 142 142  
K 4.0 3.3* 3.4* 3.7  108 109* 106 CO2 24 24 26 25 GLU 94 107* 70 92 BUN 22* 23* 25* 26* CREA 1.20 1.24 1.27 1.36* CA 8.3* 7.9* 8.3* 8.7 MG  --   --   --  2.1 PHOS  --   --   --  4.9* ALB  --   --   --  2.9* TBILI  --   --   --  1.0 SGOT  --   --   --  16 ALT  --   --   --  16 INR  --   --   --  1.3*

## 2019-12-06 NOTE — PROGRESS NOTES
Bedside shift change report GIVEN TO Maricruz Rodriguez RN. Report included the following information SBAR. SIGNIFICANT CHANGES DURING SHIFT:   
 
 
 
CONCERNS TO ADDRESS WITH MD:   
 
 
 
 
 
[facility] NURSING NOTE Admission Date 12/3/2019 Admission Diagnosis CHF (congestive heart failure) ([facility] [I50.9] Consults IP CONSULT TO CARDIOLOGY 
IP CONSULT TO VASCULAR SURGERY Cardiac Monitoring [x] Yes [] No  
  
Purposeful Hourly Rounding [x] Yes   
Marah Score Total Score: 1 Marah score 3 or > [] Bed Alarm [] Avasys [] 1:1 sitter [] Patient refused (Signed refusal form in chart) Shukri Score Shukri Score: 16 Shukri score 14 or < [] PMT consult [] Wound Care consult  
 []  Specialty bed  [] Nutrition consult Influenza Vaccine Received Flu Vaccine for Current Season (usually Sept-March): No  
 Patient/Guardian Refused (Notify MD): No  
  
Oxygen needs? [x] Room air Oxygen @  []1L    []2L    []3L   []4L    []5L   []6L via NC Chronic home O2 use? [] Yes [] No 
Perform O2 challenge test and document in progress note using [employer] (.Homeoxygen) Last bowel movement Last Bowel Movement Date: 12/04/19 Urinary Catheter LDAs Peripheral IV 12/04/19 Anterior; Left Wrist (Active) Site Assessment Clean, dry, & intact 12/5/2019  7:42 PM  
Phlebitis Assessment 0 12/5/2019  7:42 PM  
Infiltration Assessment 0 12/5/2019  7:42 PM  
Dressing Status Clean, dry, & intact 12/5/2019  7:42 PM  
Dressing Type Transparent 12/5/2019  7:42 PM  
Hub Color/Line Status Green 12/5/2019  7:42 PM  
                  
  
Readmission Risk Assessment Tool Score Low Risk   
      
 4 Total Score   
  
 4 Pt. Coverage (Medicare=5 , Medicaid, or Self-Pay=4) Criteria that do not apply:  
 Has Seen PCP in Last 6 Months (Yes=3, No=0) . Living with Significant Other. Assisted Living. LTAC. SNF. or  
Rehab Patient Length of Stay (>5 days = 3) IP Visits Last 12 Months (1-3=4, 4=9, >4=11) Charlson Comorbidity Score (Age + Comorbid Conditions) Expected Length of Stay 2d 9h  
Actual Length of Stay 3

## 2019-12-06 NOTE — PROGRESS NOTES
[address] [place] [address]  [phone] Cardiology Progress Note 
 
 
12/6/2019 11 AM 
 
Admit Date: 12/3/2019 Admit Diagnosis:  
CHF (congestive heart failure) ([facility] [I50.9] Interval History/Subjective:  
 
Farhat Mckeon is a 44 y.o. male with PMH DM who was admitted for CHF (congestive heart failure) ([facility] [I50.9]. -VSS 
-labs stable 
-weight down 4#; I/O neg 2L 
-Mr. Jim Noriega is feeling well today. No current SOB or pain. No questions about cath. Visit Vitals BP (!) 141/104 (BP 1 Location: Right arm, BP Patient Position: At rest) Pulse 79 Temp 98.2 °F (36.8 °C) Resp 18 Ht 6' 1\" (1.854 m) Wt 88.3 kg (194 lb 11.2 oz) SpO2 98% BMI 25.69 kg/m² Current Facility-Administered Medications Medication Dose Route Frequency  carvedilol (COREG) tablet 12.5 mg  12.5 mg Oral BID  potassium chloride (K-DUR, KLOR-CON) SR tablet 40 mEq  40 mEq Oral BID  losartan (COZAAR) tablet 25 mg  25 mg Oral DAILY  nitroglycerin (NITROBID) 2 % ointment 1 Inch  1 Inch Topical Q6H PRN  
 sodium chloride (NS) flush 5-40 mL  5-40 mL IntraVENous Q8H  
 sodium chloride (NS) flush 5-40 mL  5-40 mL IntraVENous PRN  
 acetaminophen (TYLENOL) tablet 650 mg  650 mg Oral Q6H PRN  
 ondansetron (ZOFRAN) injection 4 mg  4 mg IntraVENous Q6H PRN  
 bisacodyl (DULCOLAX) tablet 5 mg  5 mg Oral DAILY PRN  
 enoxaparin (LOVENOX) injection 40 mg  40 mg SubCUTAneous Q24H  cefTRIAXone (ROCEPHIN) 1 g in 0.9% sodium chloride (MBP/ADV) 50 mL  1 g IntraVENous Q24H  
 insulin lispro (HUMALOG) injection   SubCUTAneous AC&HS  
 glucose chewable tablet 16 g  4 Tab Oral PRN  
 dextrose (D50) infusion 12.5-25 g  12.5-25 g IntraVENous PRN  
 glucagon (GLUCAGEN) injection 1 mg  1 mg IntraMUSCular PRN  
 furosemide (LASIX) injection 40 mg  40 mg IntraVENous BID  albuterol-ipratropium (DUO-NEB) 2.5 MG-0.5 MG/3 ML  3 mL Nebulization Q4H PRN  
  neomycin-bacitracin-polymyxin (NEOSPORIN) ointment   Topical DAILY  influenza vaccine 2019-20 (6 mos+)(PF) (FLUARIX/FLULAVAL/FLUZONE QUAD) injection 0.5 mL  0.5 mL IntraMUSCular PRIOR TO DISCHARGE Objective:  
  
Physical Exam: 
General:  Pleasant  male resting in bed in NAD Heart: RRR, no m/S3/JVD Lungs: clear Abdomen: Soft, +BS, NTND Extremities: weak distal pulses; trace to trace pitting edema BLE with thickened skin changes Neurologic: Grossly normal 
Skin:  Warm and dry. Data Review:  
Recent Labs 12/06/19 
0138 12/05/19 
0025 12/04/19 
0214 WBC 5.2 5.3 4.7 HGB 11.3* 10.7* 10.6* HCT 37.0 35.2* 35.4*  
 191 191 Recent Labs 12/06/19 
0138 12/05/19 
0025 12/04/19 
0214 12/03/19 
1528  140 142 142  
K 4.0 3.3* 3.4* 3.7  108 109* 106 CO2 24 24 26 25 GLU 94 107* 70 92 BUN 22* 23* 25* 26* CREA 1.20 1.24 1.27 1.36* CA 8.3* 7.9* 8.3* 8.7 MG  --   --   --  2.1 PHOS  --   --   --  4.9* ALB  --   --   --  2.9* TBILI  --   --   --  1.0 SGOT  --   --   --  16 ALT  --   --   --  16 INR  --   --   --  1.3* Recent Labs 12/04/19 
0759 12/04/19 
0214 12/03/19 
1528 TROIQ <0.05 <0.05 <0.05 Intake/Output Summary (Last 24 hours) at 12/6/2019 1207 Last data filed at 12/6/2019 4912 Gross per 24 hour Intake 440 ml Output 2325 ml Net -1885 ml  
  
  
Telemetry: Sr with  PVC 
ECG: ST 
Echocardiogram: EF 16-20%; RV dilatation and decreased RV function; mod pHTN  
CXRAY: \"Evidence of pulmonary hyperaeration. \" Assessment:  
 
Active Problems: 
  Biventricular failure ([address] (12/3/2019) Hypertension (12/4/2019) Plan: BiV heart failure:  Symptoms improving. EF 16-20%; RV dilatation with decreased function and mod pulmonary HTN. · Switch to PO lasix. Unless LVEDP high on cath. · Continue BB  And ARB Cardiac cath today.    Dr. Greg Sotomayor discussed the risks/benefits/alternatives of cardiac catheterization +/- PCI with the patient. Risks include (but are not limited to) bleeding, infection, cva/mi/tamponade/death. The patient understands and wishes to proceed. · Discussed increased risk of sudden cardiac death with patient due to his low EF. Unsure if patient will be able to get a Lifevest until his Medicaid is accepted, but patient is wanting to pursue if able. May have to be obtained out patient after insurance accepted. HTN:  BP improved · BB and ARB Lizbet Ramirez NP 
DNP, RN, AGACNP-BC Patient seen and examined by me with nurse practitioner. I personally performed all components of the history, physical, and medical decision making and agree with the assessment and plan as noted.  
 
Tommie Walden MD

## 2019-12-07 LAB
ANION GAP SERPL CALC-SCNC: 8 MMOL/L (ref 5–15)
BASOPHILS # BLD: 0 K/UL (ref 0–0.1)
BASOPHILS NFR BLD: 0 % (ref 0–1)
BUN SERPL-MCNC: 21 MG/DL (ref 6–20)
BUN/CREAT SERPL: 17 (ref 12–20)
CALCIUM SERPL-MCNC: 8.4 MG/DL (ref 8.5–10.1)
CHLORIDE SERPL-SCNC: 109 MMOL/L (ref 97–108)
CO2 SERPL-SCNC: 25 MMOL/L (ref 21–32)
CREAT SERPL-MCNC: 1.23 MG/DL (ref 0.7–1.3)
DIFFERENTIAL METHOD BLD: ABNORMAL
EOSINOPHIL # BLD: 0.1 K/UL (ref 0–0.4)
EOSINOPHIL NFR BLD: 3 % (ref 0–7)
ERYTHROCYTE [DISTWIDTH] IN BLOOD BY AUTOMATED COUNT: 17.6 % (ref 11.5–14.5)
GLUCOSE BLD STRIP.AUTO-MCNC: 108 MG/DL (ref 65–100)
GLUCOSE BLD STRIP.AUTO-MCNC: 119 MG/DL (ref 65–100)
GLUCOSE BLD STRIP.AUTO-MCNC: 87 MG/DL (ref 65–100)
GLUCOSE BLD STRIP.AUTO-MCNC: 92 MG/DL (ref 65–100)
GLUCOSE SERPL-MCNC: 83 MG/DL (ref 65–100)
HCT VFR BLD AUTO: 37.6 % (ref 36.6–50.3)
HGB BLD-MCNC: 11.5 G/DL (ref 12.1–17)
IMM GRANULOCYTES # BLD AUTO: 0 K/UL (ref 0–0.04)
IMM GRANULOCYTES NFR BLD AUTO: 0 % (ref 0–0.5)
LYMPHOCYTES # BLD: 1 K/UL (ref 0.8–3.5)
LYMPHOCYTES NFR BLD: 19 % (ref 12–49)
MAGNESIUM SERPL-MCNC: 2.3 MG/DL (ref 1.6–2.4)
MCH RBC QN AUTO: 23.4 PG (ref 26–34)
MCHC RBC AUTO-ENTMCNC: 30.6 G/DL (ref 30–36.5)
MCV RBC AUTO: 76.6 FL (ref 80–99)
MONOCYTES # BLD: 0.4 K/UL (ref 0–1)
MONOCYTES NFR BLD: 7 % (ref 5–13)
NEUTS SEG # BLD: 3.7 K/UL (ref 1.8–8)
NEUTS SEG NFR BLD: 71 % (ref 32–75)
NRBC # BLD: 0 K/UL (ref 0–0.01)
NRBC BLD-RTO: 0 PER 100 WBC
PLATELET # BLD AUTO: 186 K/UL (ref 150–400)
PMV BLD AUTO: 11.5 FL (ref 8.9–12.9)
POTASSIUM SERPL-SCNC: 3.9 MMOL/L (ref 3.5–5.1)
RBC # BLD AUTO: 4.91 M/UL (ref 4.1–5.7)
SERVICE CMNT-IMP: ABNORMAL
SERVICE CMNT-IMP: ABNORMAL
SERVICE CMNT-IMP: NORMAL
SERVICE CMNT-IMP: NORMAL
SODIUM SERPL-SCNC: 142 MMOL/L (ref 136–145)
WBC # BLD AUTO: 5.2 K/UL (ref 4.1–11.1)

## 2019-12-07 PROCEDURE — 74011636320 HC RX REV CODE- 636/320: Performed by: INTERNAL MEDICINE

## 2019-12-07 PROCEDURE — 65660000000 HC RM CCU STEPDOWN

## 2019-12-07 PROCEDURE — C1769 GUIDE WIRE: HCPCS | Performed by: INTERNAL MEDICINE

## 2019-12-07 PROCEDURE — C1894 INTRO/SHEATH, NON-LASER: HCPCS | Performed by: INTERNAL MEDICINE

## 2019-12-07 PROCEDURE — 82962 GLUCOSE BLOOD TEST: CPT

## 2019-12-07 PROCEDURE — 83735 ASSAY OF MAGNESIUM: CPT

## 2019-12-07 PROCEDURE — 77030019569 HC BND COMPR RAD TERU -B: Performed by: INTERNAL MEDICINE

## 2019-12-07 PROCEDURE — 99152 MOD SED SAME PHYS/QHP 5/>YRS: CPT | Performed by: INTERNAL MEDICINE

## 2019-12-07 PROCEDURE — 74011250637 HC RX REV CODE- 250/637: Performed by: INTERNAL MEDICINE

## 2019-12-07 PROCEDURE — 74011250636 HC RX REV CODE- 250/636: Performed by: INTERNAL MEDICINE

## 2019-12-07 PROCEDURE — 77030010221 HC SPLNT WR POS TELE -B: Performed by: INTERNAL MEDICINE

## 2019-12-07 PROCEDURE — 77030008543 HC TBNG MON PRSS MRTM -A: Performed by: INTERNAL MEDICINE

## 2019-12-07 PROCEDURE — 77030015766: Performed by: INTERNAL MEDICINE

## 2019-12-07 PROCEDURE — 74011250637 HC RX REV CODE- 250/637

## 2019-12-07 PROCEDURE — 85025 COMPLETE CBC W/AUTO DIFF WBC: CPT

## 2019-12-07 PROCEDURE — 93458 L HRT ARTERY/VENTRICLE ANGIO: CPT | Performed by: INTERNAL MEDICINE

## 2019-12-07 PROCEDURE — 74011000258 HC RX REV CODE- 258: Performed by: INTERNAL MEDICINE

## 2019-12-07 PROCEDURE — 80048 BASIC METABOLIC PNL TOTAL CA: CPT

## 2019-12-07 PROCEDURE — 74011250637 HC RX REV CODE- 250/637: Performed by: NURSE PRACTITIONER

## 2019-12-07 PROCEDURE — B2111ZZ FLUOROSCOPY OF MULTIPLE CORONARY ARTERIES USING LOW OSMOLAR CONTRAST: ICD-10-PCS | Performed by: INTERNAL MEDICINE

## 2019-12-07 PROCEDURE — 74011000250 HC RX REV CODE- 250: Performed by: INTERNAL MEDICINE

## 2019-12-07 PROCEDURE — 77030004549 HC CATH ANGI DX PRF MRTM -A: Performed by: INTERNAL MEDICINE

## 2019-12-07 PROCEDURE — 4A023N7 MEASUREMENT OF CARDIAC SAMPLING AND PRESSURE, LEFT HEART, PERCUTANEOUS APPROACH: ICD-10-PCS | Performed by: INTERNAL MEDICINE

## 2019-12-07 PROCEDURE — 36415 COLL VENOUS BLD VENIPUNCTURE: CPT

## 2019-12-07 RX ORDER — HEPARIN SODIUM 200 [USP'U]/100ML
INJECTION, SOLUTION INTRAVENOUS
Status: COMPLETED | OUTPATIENT
Start: 2019-12-07 | End: 2019-12-07

## 2019-12-07 RX ORDER — ASPIRIN 81 MG/1
81 TABLET ORAL DAILY
Status: DISCONTINUED | OUTPATIENT
Start: 2019-12-08 | End: 2019-12-11 | Stop reason: HOSPADM

## 2019-12-07 RX ORDER — VERAPAMIL HYDROCHLORIDE 2.5 MG/ML
INJECTION, SOLUTION INTRAVENOUS AS NEEDED
Status: DISCONTINUED | OUTPATIENT
Start: 2019-12-07 | End: 2019-12-07 | Stop reason: HOSPADM

## 2019-12-07 RX ORDER — HEPARIN SODIUM 1000 [USP'U]/ML
INJECTION, SOLUTION INTRAVENOUS; SUBCUTANEOUS AS NEEDED
Status: DISCONTINUED | OUTPATIENT
Start: 2019-12-07 | End: 2019-12-07 | Stop reason: HOSPADM

## 2019-12-07 RX ORDER — FENTANYL CITRATE 50 UG/ML
INJECTION, SOLUTION INTRAMUSCULAR; INTRAVENOUS AS NEEDED
Status: DISCONTINUED | OUTPATIENT
Start: 2019-12-07 | End: 2019-12-07 | Stop reason: HOSPADM

## 2019-12-07 RX ORDER — MIDAZOLAM HYDROCHLORIDE 1 MG/ML
INJECTION, SOLUTION INTRAMUSCULAR; INTRAVENOUS AS NEEDED
Status: DISCONTINUED | OUTPATIENT
Start: 2019-12-07 | End: 2019-12-07 | Stop reason: HOSPADM

## 2019-12-07 RX ORDER — GUAIFENESIN 100 MG/5ML
LIQUID (ML) ORAL
Status: COMPLETED
Start: 2019-12-07 | End: 2019-12-07

## 2019-12-07 RX ORDER — ATORVASTATIN CALCIUM 40 MG/1
40 TABLET, FILM COATED ORAL
Status: DISCONTINUED | OUTPATIENT
Start: 2019-12-07 | End: 2019-12-11 | Stop reason: HOSPADM

## 2019-12-07 RX ORDER — LIDOCAINE HYDROCHLORIDE 10 MG/ML
INJECTION INFILTRATION; PERINEURAL AS NEEDED
Status: DISCONTINUED | OUTPATIENT
Start: 2019-12-07 | End: 2019-12-07 | Stop reason: HOSPADM

## 2019-12-07 RX ORDER — POTASSIUM CHLORIDE 750 MG/1
10 TABLET, FILM COATED, EXTENDED RELEASE ORAL DAILY
Status: DISCONTINUED | OUTPATIENT
Start: 2019-12-07 | End: 2019-12-11 | Stop reason: HOSPADM

## 2019-12-07 RX ORDER — GUAIFENESIN 100 MG/5ML
LIQUID (ML) ORAL
Status: DISPENSED
Start: 2019-12-07 | End: 2019-12-07

## 2019-12-07 RX ORDER — FUROSEMIDE 40 MG/1
40 TABLET ORAL 2 TIMES DAILY
Status: DISCONTINUED | OUTPATIENT
Start: 2019-12-07 | End: 2019-12-11 | Stop reason: HOSPADM

## 2019-12-07 RX ADMIN — ATORVASTATIN CALCIUM 40 MG: 40 TABLET, FILM COATED ORAL at 20:54

## 2019-12-07 RX ADMIN — ENOXAPARIN SODIUM 40 MG: 40 INJECTION SUBCUTANEOUS at 21:02

## 2019-12-07 RX ADMIN — ASPIRIN 81 MG 81 MG: 81 TABLET ORAL at 07:14

## 2019-12-07 RX ADMIN — FUROSEMIDE 40 MG: 40 TABLET ORAL at 15:52

## 2019-12-07 RX ADMIN — POTASSIUM CHLORIDE 10 MEQ: 750 TABLET, FILM COATED, EXTENDED RELEASE ORAL at 08:49

## 2019-12-07 RX ADMIN — Medication 10 ML: at 20:54

## 2019-12-07 RX ADMIN — CARVEDILOL 12.5 MG: 12.5 TABLET, FILM COATED ORAL at 06:03

## 2019-12-07 RX ADMIN — CEFTRIAXONE 1 G: 1 INJECTION, POWDER, FOR SOLUTION INTRAMUSCULAR; INTRAVENOUS at 06:26

## 2019-12-07 RX ADMIN — Medication 10 ML: at 06:24

## 2019-12-07 RX ADMIN — FUROSEMIDE 40 MG: 40 TABLET ORAL at 08:48

## 2019-12-07 RX ADMIN — LOSARTAN POTASSIUM 25 MG: 25 TABLET, FILM COATED ORAL at 08:48

## 2019-12-07 RX ADMIN — CARVEDILOL 12.5 MG: 12.5 TABLET, FILM COATED ORAL at 15:52

## 2019-12-07 NOTE — PROGRESS NOTES
12/7/2019 7:05 AM 
 
Admit Date: 12/3/2019 Admit Diagnosis: CHF (congestive heart failure) ([facility] [I50.9] Subjective:  
 
Arpan Morillo   denies chest pain, chest pressure/discomfort, dyspnea, palpitations, irregular heart beats, near-syncope, syncope, fatigue, orthopnea, paroxysmal nocturnal dyspnea, exertional chest pressure/discomfort, claudication. Visit Vitals BP (!) 131/93 (BP 1 Location: Right arm, BP Patient Position: At rest) Pulse 75 Temp 97.5 °F (36.4 °C) Resp 18 Ht 6' 1\" (1.854 m) Wt 189 lb 3.2 oz (85.8 kg) SpO2 98% BMI 24.96 kg/m² Current Facility-Administered Medications Medication Dose Route Frequency  aspirin 81 mg chewable tablet  potassium chloride SR (KLOR-CON 10) tablet 10 mEq  10 mEq Oral DAILY  carvedilol (COREG) tablet 12.5 mg  12.5 mg Oral BID  furosemide (LASIX) tablet 40 mg  40 mg Oral DAILY  losartan (COZAAR) tablet 25 mg  25 mg Oral DAILY  nitroglycerin (NITROBID) 2 % ointment 1 Inch  1 Inch Topical Q6H PRN  
 sodium chloride (NS) flush 5-40 mL  5-40 mL IntraVENous Q8H  
 sodium chloride (NS) flush 5-40 mL  5-40 mL IntraVENous PRN  
 acetaminophen (TYLENOL) tablet 650 mg  650 mg Oral Q6H PRN  
 ondansetron (ZOFRAN) injection 4 mg  4 mg IntraVENous Q6H PRN  
 bisacodyl (DULCOLAX) tablet 5 mg  5 mg Oral DAILY PRN  
 enoxaparin (LOVENOX) injection 40 mg  40 mg SubCUTAneous Q24H  cefTRIAXone (ROCEPHIN) 1 g in 0.9% sodium chloride (MBP/ADV) 50 mL  1 g IntraVENous Q24H  
 insulin lispro (HUMALOG) injection   SubCUTAneous AC&HS  
 glucose chewable tablet 16 g  4 Tab Oral PRN  
 dextrose (D50W) injection syrg 12.5-25 g  12.5-25 g IntraVENous PRN  
 glucagon (GLUCAGEN) injection 1 mg  1 mg IntraMUSCular PRN  
 albuterol-ipratropium (DUO-NEB) 2.5 MG-0.5 MG/3 ML  3 mL Nebulization Q4H PRN  
 neomycin-bacitracin-polymyxin (NEOSPORIN) ointment   Topical DAILY  influenza vaccine 2019-20 (6 mos+)(PF) (FLUARIX/FLULAVAL/FLUZONE QUAD) injection 0.5 mL  0.5 mL IntraMUSCular PRIOR TO DISCHARGE Objective:  
  
Visit Vitals BP (!) 131/93 (BP 1 Location: Right arm, BP Patient Position: At rest) Pulse 75 Temp 97.5 °F (36.4 °C) Resp 18 Ht 6' 1\" (1.854 m) Wt 189 lb 3.2 oz (85.8 kg) SpO2 98% BMI 24.96 kg/m² Physical Exam: Abdomen: soft, non-tender. Bowel sounds normal.  
Extremities: no cyanosis or edema Heart: regular rate and rhythm, S1, S2 normal, no murmur, click, rub or gallop Lungs: clear to auscultation bilaterally Neurologic: Grossly normal 
 
Data Review:  
Labs:   
Recent Results (from the past 24 hour(s)) GLUCOSE, POC Collection Time: 12/06/19  7:20 AM  
Result Value Ref Range Glucose (POC) 97 65 - 100 mg/dL Performed by Gloria Carlson (PCT) GLUCOSE, POC Collection Time: 12/06/19 11:43 AM  
Result Value Ref Range Glucose (POC) 87 65 - 100 mg/dL Performed by Gloria Carlson (PCT) GLUCOSE, POC Collection Time: 12/06/19  4:16 PM  
Result Value Ref Range Glucose (POC) 88 65 - 100 mg/dL Performed by Gloria Carlson (PCT) GLUCOSE, POC Collection Time: 12/06/19  8:45 PM  
Result Value Ref Range Glucose (POC) 167 (H) 65 - 100 mg/dL Performed by Kanika Mckinnon GLUCOSE, POC Collection Time: 12/07/19  6:22 AM  
Result Value Ref Range Glucose (POC) 87 65 - 100 mg/dL Performed by Blas Myers Telemetry: normal sinus rhythm Assessment:  
 
Active Problems: 
  Biventricular failure ([address] (12/3/2019) Hypertension (12/4/2019) Plan: BiV heart failure:     
Continue current meds. For cardiac cath today. I discussed the risks/benefits/alternatives of the procedure with the patient. Risks include (but are not limited to) bleeding, infection, cva/mi/tamponade/death.  The patient understands and agrees to proceed. 
  
HTN:  contniue current meds.

## 2019-12-07 NOTE — PROGRESS NOTES
Patient to be transferred to [facility], will get hand off report [address] TRANSFER - IN REPORT: 
 
Verbal report received from Cookie Solares (name) on [facility]  being received from [address] (unit) for routine progression of care Report consisted of patients Situation, Background, Assessment and  
Recommendations(SBAR). Information from the following report(s) SBAR was reviewed with the receiving nurse. Opportunity for questions and clarification was provided. Assessment completed upon patients arrival to unit and care assumed. [address] Patient arrived on the floor without distress, patient tolerated transfer to bed without complication. Vital signs stable [facility] Consent signed, CHG bath done, right radial site and right groin site prepped.

## 2019-12-07 NOTE — PROGRESS NOTES
Problem: Heart Failure: Day 3 Goal: Off Pathway (Use only if patient is Off Pathway) 
12/6/2019 2349 by Olamide Bowers RN Outcome: Progressing Towards Goal 
12/6/2019 2348 by Olamide Bowers, RN Outcome: Progressing Towards Goal 
Goal: Activity/Safety 12/6/2019 2349 by Olamide Bowers RN Outcome: Progressing Towards Goal 
12/6/2019 2348 by Olamide Bowers, RN Outcome: Progressing Towards Goal 
Goal: Diagnostic Test/Procedures 12/6/2019 2349 by Olamide Bowers, RN Outcome: Progressing Towards Goal 
12/6/2019 2348 by Olamide Bowers RN Outcome: Progressing Towards Goal 
Goal: Nutrition/Diet 12/6/2019 2349 by Olamide Bowers, RN Outcome: Progressing Towards Goal 
12/6/2019 2348 by Olamide Bowers RN Outcome: Progressing Towards Goal 
Goal: Discharge Planning 12/6/2019 2349 by Olamide Bowers RN Outcome: Progressing Towards Goal 
12/6/2019 2348 by Olamide Bowers, RN Outcome: Progressing Towards Goal 
Goal: Medications 12/6/2019 2349 by Olamide Bowers, RN Outcome: Progressing Towards Goal 
12/6/2019 2348 by Olamide Bowers, RN Outcome: Progressing Towards Goal 
Goal: Respiratory 12/6/2019 2349 by Olamide Bowers, RN Outcome: Progressing Towards Goal 
12/6/2019 2348 by Olamide Bowers, RN Outcome: Progressing Towards Goal 
Goal: Treatments/Interventions/Procedures 12/6/2019 2349 by Olamide Bowers, RN Outcome: Progressing Towards Goal 
12/6/2019 2348 by Olamide Bowers, RN Outcome: Progressing Towards Goal 
Goal: Psychosocial 
12/6/2019 2349 by Olamide Bowers, RN Outcome: Progressing Towards Goal 
12/6/2019 2348 by Olamide Bowers, RN Outcome: Progressing Towards Goal 
Goal: *Oxygen saturation within defined limits 12/6/2019 2349 by Olamide Bowers, RN Outcome: Progressing Towards Goal 
12/6/2019 2348 by Olamide Bowers, RN Outcome: Progressing Towards Goal 
Goal: *Hemodynamically stable 12/6/2019 2349 by Olamide Bowers, RN Outcome: Progressing Towards Goal 
12/6/2019 2348 by Olamide Bowers RN Outcome: Progressing Towards Goal 
 Goal: *Optimal pain control at patient's stated goal 
12/6/2019 2349 by Effie Mathews RN Outcome: Progressing Towards Goal 
12/6/2019 2348 by Effie Mtahews RN Outcome: Progressing Towards Goal 
Goal: *Anxiety reduced or absent 12/6/2019 2349 by Effie Mathews RN Outcome: Progressing Towards Goal 
12/6/2019 2348 by Effie Mathews RN Outcome: Progressing Towards Goal 
Goal: *Demonstrates progressive activity 12/6/2019 2349 by Effie Mathews RN Outcome: Progressing Towards Goal 
12/6/2019 2348 by Effie Mathews RN Outcome: Progressing Towards Goal

## 2019-12-07 NOTE — PROGRESS NOTES
Hospitalist Progress Note NAME: Manisha Warren :  1980 MRN:  [id number] Admit date: 12/3/2019 Today's date: 19 PCP: None Kierra Barillas M.D. Cell [phone] Assessment / Plan: 
 
Acute systolic CHF POA LVEF 16 to 20% CAD 3 vessel POA Essential HTN uncontrolled POA 
COPD POA Admitted with increasing [address], [address] CXR read as hyperinflated lungs, no ASD or edema, not currently wheezing May have some element of COPD, but DANIELSON mainly the heart Echocardiogram Severe systolic dysfunction LVEF 16 - 20%. Reduced RV function as well Edema and dyspnea improved with diuresis BP improved = continue coreg and losartan Cardiac cath = normal LM, 90% mid LAD, Left circ severe disease, 100% OM1 Distal RCA 80% D/W Dr Tia Mcgowan, cardiac MRI to assess viability, then decide about revascularization D/w patient importance of quitting smoking again today LDL 69 Start statin with the CAD 
D/C once cleared by cardiology Hypokalemia K 3.2 improved KCL 40 meq x 2 Serial labs DM type 2 POA HgBa1c 6.1 Currently diet controlled Not currently on home medications Sliding scale with insulin BS 87, 88, 167, 87, 92 
 
? Left leg cellulitis vs venous stasis changes POA History of diabetic ulcers, s/p right 3rd toe amputation No significant PVD based on my exam and PVRs unless distal disease Distal pulses 2+ in both legs on my exam, DARIO 1.14and 1.25 Vascular surgery consultation saw, appreciate Dr Veronica Mojica input Continue wound care IV ceftriaxone, PO antibiotics at discharge, seems improved, hold vanco 
Needs podiatry follow up as outpatient, discussed with patient Tobacco use 1 PPD x 20 years CXR with hyperinflation Quit 1 month ago, stressed importance of quitting with patient Code Status: Full Surrogate Decision Maker:Yasemin Keene 
  
DVT Prophylaxis: Lovenox GI Prophylaxis: not indicated Subjective: Chief Complaint / Reason for Physician Visit f/u systolic CHF \"my legs are much less swollen\"  Discussed with RN events overnight. Less edematous, not SOB Cardiac cath today with severe 3 vessel CAD, awaiting cardiac MRI Denied CP Offered to call family, patient declined, said he already spoke to sister Review of Systems: 
Symptom Y/N Comments  Symptom Y/N Comments Fever/Chills n   Chest Pain Poor Appetite    Edema less Cough n   Abdominal Pain Sputum    Joint Pain SOB/DANIELSON less   Headache Nausea/vomit n   Tolerating PT/OT Diarrhea n   Tolerating Diet y Constipation    Other Could NOT obtain due to:   
 
Objective: VITALS:  
Last 24hrs VS reviewed since prior progress note. Most recent are: 
Patient Vitals for the past 24 hrs: 
 Temp Pulse Resp BP SpO2  
12/07/19 1030  72  121/86 96 % 12/07/19 1020  85   99 % 12/07/19 1015    109/77   
12/07/19 1000    121/77   
12/07/19 0945  74  (!) 121/92 99 % 12/07/19 0930    128/89   
12/07/19 0915    121/81   
12/07/19 0900  73   93 % 12/07/19 0854  75   98 % 12/07/19 0851  75   98 % 12/07/19 0844 97.9 °F (36.6 °C) 71 18 125/86 98 % 12/07/19 0710 97.4 °F (36.3 °C) 75 18 (!) 127/99 98 % 12/07/19 0420 97.5 °F (36.4 °C) 75 18 (!) 131/93 98 % 12/06/19 2334 97.8 °F (36.6 °C) 81 20 (!) 130/93 99 % 12/06/19 1932 97.6 °F (36.4 °C) 75  118/68 99 % 12/06/19 1553    (!) 142/96   
12/06/19 1428 97.2 °F (36.2 °C) 80 18 (!) 140/100 99 % 12/06/19 1300    (!) 146/105   
12/06/19 1139 98.2 °F (36.8 °C) 79 18 (!) 141/104 98 % Intake/Output Summary (Last 24 hours) at 12/7/2019 1058 Last data filed at 12/7/2019 5859 Gross per 24 hour Intake 510 ml Output 1600 ml Net -1090 ml Wt Readings from Last 12 Encounters:  
12/06/19 85.8 kg (189 lb 3.2 oz) 12/07/19 85.8 kg (189 lb 3.2 oz) 12/03/19 94.3 kg (208 lb)  
01/12/15 90.7 kg (200 lb) 12/01/14 90.7 kg (200 lb) 11/03/14 90.7 kg (200 lb) 10/01/14 90.7 kg (200 lb) 09/03/14 85.7 kg (189 lb)  
07/28/14 85.7 kg (189 lb) 07/07/14 85.7 kg (189 lb)  
06/16/14 85.7 kg (189 lb) 06/02/14 85.7 kg (189 lb) PHYSICAL EXAM: 
General: WD, WN. Alert, cooperative, no acute distress EENT:  PERRL. Anicteric sclerae. MMM Resp:  Few crackles at bases, no wheezing or rales. No accessory muscle use CV:  Regular  rhythm, trace edema GI:  Soft, Non distended, Non tender. +Bowel sounds, no rebound Neurologic:  Alert and oriented X 3, normal speech, non focal motor exam 
Psych:   Good insight. Not anxious nor agitated Skin:  Erythema, mild warmth L > R calf 1-2 cm diabetic foot ulcer plantar right foot. No jaundice All photos 12/3 Reviewed most current lab test results and cultures  YES Reviewed most current radiology test results   YES Review and summation of old records today    NO Reviewed patient's current orders and MAR    YES 
PMH/ reviewed - no change compared to H&P 
________________________________________________________________________ Care Plan discussed with: 
  Comments Patient x Family RN x Care Manager Consultant  x Cardiology Multidiciplinary team rounds were held today with , nursing, pharmacist and clinical coordinator. Patient's plan of care was discussed; medications were reviewed and discharge planning was addressed. ________________________________________________________________________ Comments >50% of visit spent in counseling and coordination of care    
________________________________________________________________________ Britney Jenkins MD  
 
Procedures: see electronic medical records for all procedures/Xrays and details which were not copied into this note but were reviewed prior to creation of Plan. LABS: 
I reviewed today's most current labs and imaging studies. Pertinent labs include: Recent Labs 12/07/19 
9646 12/06/19 
0138 12/05/19 
0025 WBC 5.2 5.2 5.3 HGB 11.5* 11.3* 10.7* HCT 37.6 37.0 35.2*  
 202 191 Recent Labs 12/07/19 
7949 12/06/19 
0138 12/05/19 
0025  142 140  
K 3.9 4.0 3.3*  
* 108 108 CO2 25 24 24 GLU 83 94 107* BUN 21* 22* 23* CREA 1.23 1.20 1.24  
CA 8.4* 8.3* 7.9*  
MG 2.3  --   --

## 2019-12-07 NOTE — PROGRESS NOTES
0945: called MRI regarding when pt would go for [facility] cardiac MRI, no one picked up the phone.

## 2019-12-07 NOTE — PROGRESS NOTES
Physical Therapy Note Orders received, chart reviewed, noted pt did not have cardiac cath yesterday. Pt is scheduled to have procedure today and is currently MARLYN. Will hold PT eval today and f/u tomorrow  as pt appropriate and medically stable. Thank you.  
Kevin Merida, PT

## 2019-12-07 NOTE — PROGRESS NOTES
TRANSFER - OUT REPORT: 
 
Verbal report given to [facility] (name) on Iva Harper  being transferred to [employer] [facility] (unit) for routine progression of care Report consisted of patients Situation, Background, Assessment and  
Recommendations(SBAR). Information from the following report(s) Kardex and MAR was reviewed with the receiving nurse. Lines:  
Peripheral IV 12/04/19 Anterior; Left Wrist (Active) Site Assessment Clean, dry, & intact 12/6/2019  7:32 PM  
Phlebitis Assessment 0 12/6/2019  7:32 PM  
Infiltration Assessment 0 12/6/2019  7:32 PM  
Dressing Status Clean, dry, & intact 12/6/2019  7:32 PM  
Dressing Type Tape;Transparent 12/6/2019  7:32 PM  
Hub Color/Line Status Green;Capped;Flushed;Patent 12/6/2019  7:32 PM  
  
 
Opportunity for questions and clarification was provided. Patient transported with: 
 Monitor Bedside and Verbal shift change report GIVEN TO , RN. Report included the following information Kardex. SIGNIFICANT CHANGES DURING SHIFT:   
 
 
CONCERNS TO ADDRESS WITH MD:   
 
 
 
 
[facility] NURSING NOTE Admission Date 12/3/2019 Admission Diagnosis CHF (congestive heart failure) ([facility] [address] [I50.9] Consults IP CONSULT TO CARDIOLOGY 
IP CONSULT TO VASCULAR SURGERY Cardiac Monitoring [x] Yes [] No  
  
Purposeful Hourly Rounding [x] Yes   
Marah Score Total Score: 2 Marah score 3 or > [] Bed Alarm [] Avasys [] 1:1 sitter [] Patient refused (Signed refusal form in chart) Suhkri Score Shukri Score: 17 Shukri score 14 or < [] PMT consult [] Wound Care consult  
 []  Specialty bed  [] Nutrition consult Influenza Vaccine Received Flu Vaccine for Current Season (usually Sept-March): No  
 Patient/Guardian Refused (Notify MD): No  
  
Oxygen needs? [] Room air Oxygen @  []1L    []2L    []3L   []4L    []5L   []6L via NC Chronic home O2 use? [] Yes [] No 
Perform O2 challenge test and document in progress note using smartphrase (.Homeoxygen) Last bowel movement Last Bowel Movement Date: 12/04/19 Urinary Catheter LDAs Peripheral IV 12/04/19 Anterior; Left Wrist (Active) Site Assessment Clean, dry, & intact 12/6/2019  7:32 PM  
Phlebitis Assessment 0 12/6/2019  7:32 PM  
Infiltration Assessment 0 12/6/2019  7:32 PM  
Dressing Status Clean, dry, & intact 12/6/2019  7:32 PM  
Dressing Type Tape;Transparent 12/6/2019  7:32 PM  
Hub Color/Line Status Green;Capped;Flushed;Patent 12/6/2019  7:32 PM  
                  
  
Readmission Risk Assessment Tool Score Low Risk   
      
 4 Total Score   
  
 4 Pt. Coverage (Medicare=5 , Medicaid, or Self-Pay=4) Criteria that do not apply:  
 Has Seen PCP in Last 6 Months (Yes=3, No=0) . Living with Significant Other. Assisted Living. LTAC. SNF. or  
Rehab Patient Length of Stay (>5 days = 3) IP Visits Last 12 Months (1-3=4, 4=9, >4=11) Charlson Comorbidity Score (Age + Comorbid Conditions) Expected Length of Stay 2d 9h  
Actual Length of Stay 3

## 2019-12-07 NOTE — PROGRESS NOTES
Occupational Therapy Note Orders received, chart reviewed, noted pt did not have cardiac cath yesterday. Pt is scheduled to have procedure today. Will hold OT eval and f/u tomorrow for ADL assessment as pt appropriate and medically stable. Thank you. Black Goodwin, OTR/L  
 
2364:  Spoke to RN who reports that pt had cath early this am, however, remains with hematoma. Asking to hold OT until later. Will follow up tomorrow for OT eval. RN aware and OK.  
Black Goodwin, OTR/L

## 2019-12-08 ENCOUNTER — APPOINTMENT (OUTPATIENT)
Dept: MRI IMAGING | Age: 39
DRG: 175 | End: 2019-12-08
Attending: INTERNAL MEDICINE
Payer: MEDICAID

## 2019-12-08 LAB
ANION GAP SERPL CALC-SCNC: 7 MMOL/L (ref 5–15)
BUN SERPL-MCNC: 24 MG/DL (ref 6–20)
BUN/CREAT SERPL: 18 (ref 12–20)
CALCIUM SERPL-MCNC: 8.1 MG/DL (ref 8.5–10.1)
CHLORIDE SERPL-SCNC: 108 MMOL/L (ref 97–108)
CO2 SERPL-SCNC: 25 MMOL/L (ref 21–32)
CREAT SERPL-MCNC: 1.33 MG/DL (ref 0.7–1.3)
GLUCOSE BLD STRIP.AUTO-MCNC: 96 MG/DL (ref 65–100)
GLUCOSE BLD STRIP.AUTO-MCNC: 97 MG/DL (ref 65–100)
GLUCOSE SERPL-MCNC: 90 MG/DL (ref 65–100)
POTASSIUM SERPL-SCNC: 4.2 MMOL/L (ref 3.5–5.1)
SERVICE CMNT-IMP: NORMAL
SERVICE CMNT-IMP: NORMAL
SODIUM SERPL-SCNC: 140 MMOL/L (ref 136–145)

## 2019-12-08 PROCEDURE — 36415 COLL VENOUS BLD VENIPUNCTURE: CPT

## 2019-12-08 PROCEDURE — 74011250636 HC RX REV CODE- 250/636: Performed by: INTERNAL MEDICINE

## 2019-12-08 PROCEDURE — 74011250637 HC RX REV CODE- 250/637: Performed by: INTERNAL MEDICINE

## 2019-12-08 PROCEDURE — 75561 CARDIAC MRI FOR MORPH W/DYE: CPT

## 2019-12-08 PROCEDURE — 80048 BASIC METABOLIC PNL TOTAL CA: CPT

## 2019-12-08 PROCEDURE — 74011250637 HC RX REV CODE- 250/637: Performed by: NURSE PRACTITIONER

## 2019-12-08 PROCEDURE — 65660000000 HC RM CCU STEPDOWN

## 2019-12-08 PROCEDURE — 74011000258 HC RX REV CODE- 258: Performed by: INTERNAL MEDICINE

## 2019-12-08 PROCEDURE — 82962 GLUCOSE BLOOD TEST: CPT

## 2019-12-08 RX ORDER — SODIUM CHLORIDE 0.9 % (FLUSH) 0.9 %
5-40 SYRINGE (ML) INJECTION AS NEEDED
Status: DISCONTINUED | OUTPATIENT
Start: 2019-12-08 | End: 2019-12-11 | Stop reason: HOSPADM

## 2019-12-08 RX ORDER — SODIUM CHLORIDE 0.9 % (FLUSH) 0.9 %
5-40 SYRINGE (ML) INJECTION EVERY 8 HOURS
Status: DISCONTINUED | OUTPATIENT
Start: 2019-12-08 | End: 2019-12-11 | Stop reason: HOSPADM

## 2019-12-08 RX ORDER — ENOXAPARIN SODIUM 100 MG/ML
1 INJECTION SUBCUTANEOUS EVERY 12 HOURS
Status: DISCONTINUED | OUTPATIENT
Start: 2019-12-08 | End: 2019-12-10

## 2019-12-08 RX ADMIN — FUROSEMIDE 40 MG: 40 TABLET ORAL at 17:15

## 2019-12-08 RX ADMIN — LOSARTAN POTASSIUM 25 MG: 25 TABLET, FILM COATED ORAL at 09:53

## 2019-12-08 RX ADMIN — Medication 10 ML: at 04:36

## 2019-12-08 RX ADMIN — GADOPENTETATE DIMEGLUMINE 35 ML: 469.01 INJECTION INTRAVENOUS at 12:16

## 2019-12-08 RX ADMIN — POTASSIUM CHLORIDE 10 MEQ: 750 TABLET, FILM COATED, EXTENDED RELEASE ORAL at 09:53

## 2019-12-08 RX ADMIN — FUROSEMIDE 40 MG: 40 TABLET ORAL at 09:53

## 2019-12-08 RX ADMIN — CARVEDILOL 12.5 MG: 12.5 TABLET, FILM COATED ORAL at 09:53

## 2019-12-08 RX ADMIN — CARVEDILOL 12.5 MG: 12.5 TABLET, FILM COATED ORAL at 17:15

## 2019-12-08 RX ADMIN — Medication 10 ML: at 21:16

## 2019-12-08 RX ADMIN — POLYMYXIN B SULFATE, BACITRACIN ZINC, NEOMYCIN SULFATE: 5000; 3.5; 4 OINTMENT TOPICAL at 13:38

## 2019-12-08 RX ADMIN — ATORVASTATIN CALCIUM 40 MG: 40 TABLET, FILM COATED ORAL at 21:14

## 2019-12-08 RX ADMIN — CEFTRIAXONE 1 G: 1 INJECTION, POWDER, FOR SOLUTION INTRAMUSCULAR; INTRAVENOUS at 06:56

## 2019-12-08 RX ADMIN — Medication 10 ML: at 13:39

## 2019-12-08 RX ADMIN — ASPIRIN 81 MG: 81 TABLET, COATED ORAL at 09:53

## 2019-12-08 RX ADMIN — ENOXAPARIN SODIUM 90 MG: 100 INJECTION SUBCUTANEOUS at 20:03

## 2019-12-08 NOTE — PROGRESS NOTES
Hospitalist Progress Note NAME: Mecca Ospina :  1980 MRN:  [id number] Admit date: 12/3/2019 Today's date: 19 PCP: None Deni Cadena M.D. Cell [phone] Assessment / Plan: 
 
Acute systolic CHF POA LVEF 16 to 20% CAD 3 vessel POA Essential HTN uncontrolled POA 
COPD POA Admitted with increasing [place], [address] CXR read as hyperinflated lungs, no ASD or edema, not currently wheezing May have some element of COPD, but DANIELSON mainly the heart Echocardiogram Severe systolic dysfunction LVEF 16 - 20%. Reduced RV function as well Edema and dyspnea improved with diuresis BP improved = continue coreg and losartan Cardiac cath = normal LM, 90% mid LAD, Left circ severe disease, 100% OM1 Distal RCA 80% Cardiac MRI today to assess viability, then decide about revascularization No smoking LDL 69, start statin with the CAD 
D/C once cleared by cardiology DM type 2 POA HgBa1c 6.1 Currently diet controlled Not currently on home medications Sliding scale with insulin BS 87, 92, 119, 108, 96 Hypokalemia K 3.2 improved Serial labs ? Left leg cellulitis vs venous stasis changes POA History of diabetic ulcers, s/p right 3rd toe amputation No significant PVD based on my exam and PVRs unless distal disease Distal pulses 2+ in both legs on my exam, DARIO 1.14and 1.25 Vascular surgery consultation saw, appreciate Dr Malachi Ballesteros input Continue wound care IV ceftriaxone, PO antibiotics at discharge, seems improved, hold vanco 
Needs podiatry follow up as outpatient, discussed with patient Tobacco use 1 PPD x 20 years CXR with hyperinflation Quit 1 month ago, stressed importance of quitting with patient Code Status: Full Surrogate Decision Maker:Yasemin Keene 
  
DVT Prophylaxis: Lovenox GI Prophylaxis: not indicated Subjective: Chief Complaint / Reason for Physician Visit f/u systolic CHF \"no problems\"  Discussed with RN events overnight. Swelling and SOB improved Cardiac MRI done today Denied CP Review of Systems: 
Symptom Y/N Comments  Symptom Y/N Comments Fever/Chills n   Chest Pain Poor Appetite    Edema less Cough n   Abdominal Pain Sputum    Joint Pain SOB/DANIELSON less   Headache Nausea/vomit n   Tolerating PT/OT Diarrhea n   Tolerating Diet y Constipation    Other Could NOT obtain due to:   
 
Objective: VITALS:  
Last 24hrs VS reviewed since prior progress note. Most recent are: 
Patient Vitals for the past 24 hrs: 
 Temp Pulse Resp BP SpO2  
12/08/19 0751 97 °F (36.1 °C) 81 16 (!) 138/93 97 % 12/08/19 0400 97.9 °F (36.6 °C) 70 16 (!) 133/92 98 % 12/08/19 0000 97.5 °F (36.4 °C) 67 16 122/87 97 % 12/07/19 1900 98 °F (36.7 °C) 78 18 117/76 98 % 12/07/19 1600  78  (!) 118/91 98 % 12/07/19 1500 97.8 °F (36.6 °C) 72 18 121/87 99 % 12/07/19 1430    119/86   
12/07/19 1400    114/81   
12/07/19 1345  72  112/88 100 % 12/07/19 1330  72  119/82 98 % 12/07/19 1315    111/78   
12/07/19 1300    119/83   
12/07/19 1256  74   99 % 12/07/19 1250  76   99 % 12/07/19 1243  73   100 % 12/07/19 1237  77   100 % 12/07/19 1215    115/85   
12/07/19 1200    115/87   
12/07/19 1145    113/82   
12/07/19 1130  79  112/85 99 % 12/07/19 1115    112/85   
12/07/19 1100    119/85  Intake/Output Summary (Last 24 hours) at 12/8/2019 1046 Last data filed at 12/8/2019 0400 Gross per 24 hour Intake 100 ml Output 1100 ml Net -1000 ml Wt Readings from Last 12 Encounters:  
12/08/19 85.9 kg (189 lb 6 oz) 12/07/19 85.8 kg (189 lb 3.2 oz) 12/03/19 94.3 kg (208 lb)  
01/12/15 90.7 kg (200 lb) 12/01/14 90.7 kg (200 lb) 11/03/14 90.7 kg (200 lb) 10/01/14 90.7 kg (200 lb) 09/03/14 85.7 kg (189 lb)  
07/28/14 85.7 kg (189 lb) 07/07/14 85.7 kg (189 lb) 06/16/14 85.7 kg (189 lb) 06/02/14 85.7 kg (189 lb) PHYSICAL EXAM: 
General: WD, WN. Alert, cooperative, no acute distress EENT:  PERRL. Anicteric sclerae. MMM Resp:  Few crackles at bases, no wheezing or rales. No accessory muscle use CV:  Regular  rhythm, trace edema GI:  Soft, Non distended, Non tender. +Bowel sounds, no rebound Neurologic:  Alert and oriented X 3, normal speech, non focal motor exam 
Psych:   Good insight. Not anxious nor agitated Skin:  Erythema, mild warmth L > R calf 1-2 cm diabetic foot ulcer plantar right foot. No jaundice All photos 12/3 Reviewed most current lab test results and cultures  YES Reviewed most current radiology test results   YES Review and summation of old records today    NO Reviewed patient's current orders and MAR    YES 
PMH/SH reviewed - no change compared to H&P 
________________________________________________________________________ Care Plan discussed with: 
  Comments Patient x Family RN x Care Manager Consultant  x Cardiology Multidiciplinary team rounds were held today with , nursing, pharmacist and clinical coordinator. Patient's plan of care was discussed; medications were reviewed and discharge planning was addressed. ________________________________________________________________________ Comments >50% of visit spent in counseling and coordination of care    
________________________________________________________________________ Kristine Allen MD  
 
Procedures: see electronic medical records for all procedures/Xrays and details which were not copied into this note but were reviewed prior to creation of Plan. LABS: 
I reviewed today's most current labs and imaging studies. Pertinent labs include: 
Recent Labs 12/07/19 
9364 12/06/19 
7525 WBC 5.2 5.2 HGB 11.5* 11.3* HCT 37.6 37.0  202 Recent Labs 12/08/19 3018 12/07/19 
7475 12/06/19 
5735  142 142  
K 4.2 3.9 4.0  
 109* 108 CO2 25 25 24 GLU 90 83 94 BUN 24* 21* 22* CREA 1.33* 1.23 1.20 CA 8.1* 8.4* 8.3*  
MG  --  2.3  --

## 2019-12-08 NOTE — PROGRESS NOTES
Patient off the floor for testing/imaging. Will retry at a later time.  
 
Rojelio Christianson MS, OTR/L, CSRS

## 2019-12-08 NOTE — PROGRESS NOTES
1900 - Bedside report from [facility]. NSR. No complaints. R radial site benign. 0700 - Uneventful night. No real weight change today. Lungs clear.

## 2019-12-08 NOTE — PROGRESS NOTES
Chart reviewed. Pt currently off the floor for testing. Will defer PT evaluation however continue to follow. Thank you Jose Carlton, PT, DPT

## 2019-12-08 NOTE — PROGRESS NOTES
12/8/2019 7:29 AM 
 
Admit Date: 12/3/2019 Admit Diagnosis: CHF (congestive heart failure) ([facility] [I50.9] Subjective:  
 
Dino Gave   denies chest pain, chest pressure/discomfort, dyspnea, palpitations, irregular heart beats, near-syncope, syncope, fatigue, orthopnea, paroxysmal nocturnal dyspnea, exertional chest pressure/discomfort. Visit Vitals BP (!) 133/92 (BP 1 Location: Left arm, BP Patient Position: At rest) Pulse 70 Temp 97.9 °F (36.6 °C) Resp 16 Ht 6' 1\" (1.854 m) Wt 189 lb 6 oz (85.9 kg) SpO2 98% BMI 24.99 kg/m² Current Facility-Administered Medications Medication Dose Route Frequency  potassium chloride SR (KLOR-CON 10) tablet 10 mEq  10 mEq Oral DAILY  furosemide (LASIX) tablet 40 mg  40 mg Oral BID  aspirin delayed-release tablet 81 mg  81 mg Oral DAILY  atorvastatin (LIPITOR) tablet 40 mg  40 mg Oral QHS  carvedilol (COREG) tablet 12.5 mg  12.5 mg Oral BID  losartan (COZAAR) tablet 25 mg  25 mg Oral DAILY  nitroglycerin (NITROBID) 2 % ointment 1 Inch  1 Inch Topical Q6H PRN  
 sodium chloride (NS) flush 5-40 mL  5-40 mL IntraVENous Q8H  
 sodium chloride (NS) flush 5-40 mL  5-40 mL IntraVENous PRN  
 acetaminophen (TYLENOL) tablet 650 mg  650 mg Oral Q6H PRN  
 ondansetron (ZOFRAN) injection 4 mg  4 mg IntraVENous Q6H PRN  
 bisacodyl (DULCOLAX) tablet 5 mg  5 mg Oral DAILY PRN  
 enoxaparin (LOVENOX) injection 40 mg  40 mg SubCUTAneous Q24H  
 insulin lispro (HUMALOG) injection   SubCUTAneous AC&HS  
 glucose chewable tablet 16 g  4 Tab Oral PRN  
 dextrose (D50W) injection syrg 12.5-25 g  12.5-25 g IntraVENous PRN  
 glucagon (GLUCAGEN) injection 1 mg  1 mg IntraMUSCular PRN  
 albuterol-ipratropium (DUO-NEB) 2.5 MG-0.5 MG/3 ML  3 mL Nebulization Q4H PRN  
 neomycin-bacitracin-polymyxin (NEOSPORIN) ointment   Topical DAILY  influenza vaccine 2019-20 (6 mos+)(PF) (FLUARIX/FLULAVAL/FLUZONE QUAD) injection 0.5 mL  0.5 mL IntraMUSCular PRIOR TO DISCHARGE Objective:  
  
Visit Vitals BP (!) 133/92 (BP 1 Location: Left arm, BP Patient Position: At rest) Pulse 70 Temp 97.9 °F (36.6 °C) Resp 16 Ht 6' 1\" (1.854 m) Wt 189 lb 6 oz (85.9 kg) SpO2 98% BMI 24.99 kg/m² Physical Exam: Abdomen: soft, non-tender. Bowel sounds normal.  
Extremities: no cyanosis or edema Heart: regular rate and rhythm, S1, S2 normal, no murmur, click, rub or gallop Lungs: clear to auscultation bilaterally Neurologic: Grossly normal 
 
Data Review:  
Labs:   
Recent Results (from the past 24 hour(s)) GLUCOSE, POC Collection Time: 12/07/19  8:44 AM  
Result Value Ref Range Glucose (POC) 92 65 - 100 mg/dL Performed by [address], POC Collection Time: 12/07/19  3:49 PM  
Result Value Ref Range Glucose (POC) 119 (H) 65 - 100 mg/dL Performed by [address], POC Collection Time: 12/07/19  9:06 PM  
Result Value Ref Range Glucose (POC) 108 (H) 65 - 100 mg/dL Performed by Ale Valenzuela PANEL, BASIC Collection Time: 12/08/19  4:34 AM  
Result Value Ref Range Sodium 140 136 - 145 mmol/L Potassium 4.2 3.5 - 5.1 mmol/L Chloride 108 97 - 108 mmol/L  
 CO2 25 21 - 32 mmol/L Anion gap 7 5 - 15 mmol/L Glucose 90 65 - 100 mg/dL BUN 24 (H) 6 - 20 MG/DL Creatinine 1.33 (H) 0.70 - 1.30 MG/DL  
 BUN/Creatinine ratio 18 12 - 20 GFR est AA >60 >60 ml/min/1.73m2 GFR est non-AA 60 (L) >60 ml/min/1.73m2 Calcium 8.1 (L) 8.5 - 10.1 MG/DL Telemetry: normal sinus rhythm Assessment:  
 
Active Problems: 
  Biventricular failure ([address] (12/3/2019) Hypertension (12/4/2019) Plan:  
 
ICM: cath findings noted. Plan for cardiac MRI prior to revascularization. -ve fluid balance. Continue current meds. BP controlled.

## 2019-12-08 NOTE — PROGRESS NOTES
-Please complete MRI History and Safety Screening Form for this patient using KARDEX only under Orders Requiring a Screening Form: 
 

## 2019-12-08 NOTE — PROGRESS NOTES
Problem: Falls - Risk of 
Goal: *Absence of Falls Description Document Alan Mcfarland Fall Risk and appropriate interventions in the flowsheet. Outcome: Progressing Towards Goal 
Note: Fall Risk Interventions: 
Mobility Interventions: Bed/chair exit alarm Medication Interventions: Bed/chair exit alarm Elimination Interventions: Bed/chair exit alarm, Call light in reach History of Falls Interventions: Bed/chair exit alarm

## 2019-12-09 LAB
GLUCOSE BLD STRIP.AUTO-MCNC: 116 MG/DL (ref 65–100)
GLUCOSE BLD STRIP.AUTO-MCNC: 137 MG/DL (ref 65–100)
GLUCOSE BLD STRIP.AUTO-MCNC: 180 MG/DL (ref 65–100)
GLUCOSE BLD STRIP.AUTO-MCNC: 84 MG/DL (ref 65–100)
GLUCOSE BLD STRIP.AUTO-MCNC: 91 MG/DL (ref 65–100)
LEFT ABI: 1.25
LEFT ANTERIOR TIBIAL: 188 MMHG
LEFT ARM BP: 150 MMHG
LEFT POSTERIOR TIBIAL: 174 MMHG
LEFT TBI: 1.02
LEFT TOE PRESSURE: 154 MMHG
RIGHT ABI: 1.14
RIGHT ANTERIOR TIBIAL: 165 MMHG
RIGHT ARM BP: 151 MMHG
RIGHT POSTERIOR TIBIAL: 172 MMHG
RIGHT TBI: 1.07
RIGHT TOE PRESSURE: 161 MMHG
SERVICE CMNT-IMP: ABNORMAL
SERVICE CMNT-IMP: NORMAL
SERVICE CMNT-IMP: NORMAL

## 2019-12-09 PROCEDURE — 97535 SELF CARE MNGMENT TRAINING: CPT | Performed by: OCCUPATIONAL THERAPIST

## 2019-12-09 PROCEDURE — 74011636320 HC RX REV CODE- 636/320: Performed by: INTERNAL MEDICINE

## 2019-12-09 PROCEDURE — 97116 GAIT TRAINING THERAPY: CPT

## 2019-12-09 PROCEDURE — A9579 GAD-BASE MR CONTRAST NOS,1ML: HCPCS | Performed by: INTERNAL MEDICINE

## 2019-12-09 PROCEDURE — 74011250637 HC RX REV CODE- 250/637: Performed by: NURSE PRACTITIONER

## 2019-12-09 PROCEDURE — 82962 GLUCOSE BLOOD TEST: CPT

## 2019-12-09 PROCEDURE — 74011636637 HC RX REV CODE- 636/637: Performed by: INTERNAL MEDICINE

## 2019-12-09 PROCEDURE — 97165 OT EVAL LOW COMPLEX 30 MIN: CPT | Performed by: OCCUPATIONAL THERAPIST

## 2019-12-09 PROCEDURE — 97161 PT EVAL LOW COMPLEX 20 MIN: CPT

## 2019-12-09 PROCEDURE — 74011250636 HC RX REV CODE- 250/636: Performed by: INTERNAL MEDICINE

## 2019-12-09 PROCEDURE — 74011250637 HC RX REV CODE- 250/637: Performed by: INTERNAL MEDICINE

## 2019-12-09 PROCEDURE — 65660000000 HC RM CCU STEPDOWN

## 2019-12-09 RX ORDER — CEPHALEXIN 250 MG/1
500 CAPSULE ORAL 4 TIMES DAILY
Status: COMPLETED | OUTPATIENT
Start: 2019-12-09 | End: 2019-12-10

## 2019-12-09 RX ADMIN — ATORVASTATIN CALCIUM 40 MG: 40 TABLET, FILM COATED ORAL at 21:52

## 2019-12-09 RX ADMIN — CEPHALEXIN 500 MG: 250 CAPSULE ORAL at 14:25

## 2019-12-09 RX ADMIN — Medication 10 ML: at 22:00

## 2019-12-09 RX ADMIN — ASPIRIN 81 MG: 81 TABLET, COATED ORAL at 08:25

## 2019-12-09 RX ADMIN — CEPHALEXIN 500 MG: 250 CAPSULE ORAL at 17:28

## 2019-12-09 RX ADMIN — CARVEDILOL 12.5 MG: 12.5 TABLET, FILM COATED ORAL at 17:28

## 2019-12-09 RX ADMIN — FUROSEMIDE 40 MG: 40 TABLET ORAL at 17:28

## 2019-12-09 RX ADMIN — POLYMYXIN B SULFATE, BACITRACIN ZINC, NEOMYCIN SULFATE: 5000; 3.5; 4 OINTMENT TOPICAL at 08:26

## 2019-12-09 RX ADMIN — Medication 10 ML: at 07:24

## 2019-12-09 RX ADMIN — CEPHALEXIN 500 MG: 250 CAPSULE ORAL at 08:52

## 2019-12-09 RX ADMIN — Medication 10 ML: at 14:25

## 2019-12-09 RX ADMIN — ENOXAPARIN SODIUM 90 MG: 100 INJECTION SUBCUTANEOUS at 20:30

## 2019-12-09 RX ADMIN — CEPHALEXIN 500 MG: 250 CAPSULE ORAL at 21:52

## 2019-12-09 RX ADMIN — FUROSEMIDE 40 MG: 40 TABLET ORAL at 08:25

## 2019-12-09 RX ADMIN — ENOXAPARIN SODIUM 90 MG: 100 INJECTION SUBCUTANEOUS at 08:30

## 2019-12-09 RX ADMIN — TICAGRELOR 90 MG: 90 TABLET ORAL at 08:25

## 2019-12-09 RX ADMIN — INSULIN LISPRO 2 UNITS: 100 INJECTION, SOLUTION INTRAVENOUS; SUBCUTANEOUS at 12:07

## 2019-12-09 RX ADMIN — LOSARTAN POTASSIUM 25 MG: 25 TABLET, FILM COATED ORAL at 08:25

## 2019-12-09 RX ADMIN — POTASSIUM CHLORIDE 10 MEQ: 750 TABLET, FILM COATED, EXTENDED RELEASE ORAL at 08:25

## 2019-12-09 RX ADMIN — TICAGRELOR 90 MG: 90 TABLET ORAL at 21:52

## 2019-12-09 NOTE — PROGRESS NOTES
12/9/2019 7:40 AM 
 
Admit Date: 12/3/2019 Admit Diagnosis: CHF (congestive heart failure) ([facility] [I50.9] Subjective:  
 
Farhat Mckeon   denies chest pain, chest pressure/discomfort, dyspnea, palpitations, irregular heart beats, near-syncope, syncope, fatigue, orthopnea, paroxysmal nocturnal dyspnea, exertional chest pressure/discomfort, claudication, lower extremity edema. Visit Vitals BP 93/77 (BP 1 Location: Right arm, BP Patient Position: At rest) Pulse 72 Temp 97.2 °F (36.2 °C) Resp 20 Ht 6' 1\" (1.854 m) Wt 188 lb 7.9 oz (85.5 kg) SpO2 96% BMI 24.87 kg/m² Current Facility-Administered Medications Medication Dose Route Frequency  ticagrelor (BRILINTA) tablet 90 mg  90 mg Oral Q12H  
 sodium chloride (NS) flush 5-40 mL  5-40 mL IntraVENous Q8H  
 sodium chloride (NS) flush 5-40 mL  5-40 mL IntraVENous PRN  
 enoxaparin (LOVENOX) injection 90 mg  1 mg/kg SubCUTAneous Q12H  potassium chloride SR (KLOR-CON 10) tablet 10 mEq  10 mEq Oral DAILY  furosemide (LASIX) tablet 40 mg  40 mg Oral BID  aspirin delayed-release tablet 81 mg  81 mg Oral DAILY  atorvastatin (LIPITOR) tablet 40 mg  40 mg Oral QHS  carvedilol (COREG) tablet 12.5 mg  12.5 mg Oral BID  losartan (COZAAR) tablet 25 mg  25 mg Oral DAILY  nitroglycerin (NITROBID) 2 % ointment 1 Inch  1 Inch Topical Q6H PRN  
 sodium chloride (NS) flush 5-40 mL  5-40 mL IntraVENous Q8H  
 sodium chloride (NS) flush 5-40 mL  5-40 mL IntraVENous PRN  
 acetaminophen (TYLENOL) tablet 650 mg  650 mg Oral Q6H PRN  
 ondansetron (ZOFRAN) injection 4 mg  4 mg IntraVENous Q6H PRN  
 bisacodyl (DULCOLAX) tablet 5 mg  5 mg Oral DAILY PRN  
 insulin lispro (HUMALOG) injection   SubCUTAneous AC&HS  
 glucose chewable tablet 16 g  4 Tab Oral PRN  
 dextrose (D50W) injection syrg 12.5-25 g  12.5-25 g IntraVENous PRN  
 glucagon (GLUCAGEN) injection 1 mg  1 mg IntraMUSCular PRN  
  albuterol-ipratropium (DUO-NEB) 2.5 MG-0.5 MG/3 ML  3 mL Nebulization Q4H PRN  
 neomycin-bacitracin-polymyxin (NEOSPORIN) ointment   Topical DAILY  influenza vaccine 2019-20 (6 mos+)(PF) (FLUARIX/FLULAVAL/FLUZONE QUAD) injection 0.5 mL  0.5 mL IntraMUSCular PRIOR TO DISCHARGE Objective:  
  
Visit Vitals BP 93/77 (BP 1 Location: Right arm, BP Patient Position: At rest) Pulse 72 Temp 97.2 °F (36.2 °C) Resp 20 Ht 6' 1\" (1.854 m) Wt 188 lb 7.9 oz (85.5 kg) SpO2 96% BMI 24.87 kg/m² Physical Exam: Abdomen: soft, non-tender. Bowel sounds normal.  
Extremities: no cyanosis or edema Heart: regular rate and rhythm, S1, S2 normal, no murmur, click, rub or gallop Lungs: clear to auscultation bilaterally Neurologic: Grossly normal 
 
Data Review:  
Labs:   
Recent Results (from the past 24 hour(s)) GLUCOSE, POC Collection Time: 12/08/19  7:56 AM  
Result Value Ref Range Glucose (POC) 96 65 - 100 mg/dL Performed by Stevenson Rose GLUCOSE, POC Collection Time: 12/08/19  9:13 PM  
Result Value Ref Range Glucose (POC) 97 65 - 100 mg/dL Performed by [facility] PHIL LATHAM(RN) Telemetry: normal sinus rhythm Assessment:  
 
Active Problems: 
  Biventricular failure ([address] (12/3/2019) Hypertension (12/4/2019) Plan:  
 
CAD, ICM: Cardiac MRI revealed non viable anterior and lateral walls. Inferior wall is viable. LV cavity thrombus. On lovenox for AC. Will proceed with RCA PCI on Wednesday. Add brilinta. Continue coreg, ARB, aspirin and statin. -ve fluid balance. Continue lasix. BP controlled. DARIO noted. No PVD.  Wound care.

## 2019-12-09 NOTE — PROGRESS NOTES
Occupational Therapy OT consult received, chart reviewed. Patient was seen this morning for OT evaluation. No skilled OT necessary upon discharge. Full evaluation note to follow.

## 2019-12-09 NOTE — ROUTINE PROCESS
Bedside and Verbal shift change report given to Mely Pleitez RN (oncoming nurse) by Rosey Dominguez RN (offgoing nurse). Report included the following information SBAR, MAR and Cardiac Rhythm NSR.

## 2019-12-09 NOTE — PROGRESS NOTES
Physical Therapy Order received, chart reviewed. Evaluation complete. Recommend pt be OOB for meals and mostly during the day, but with assistance of 1 due to BLE weakness and impaired balance. Pt should progress well in the acute setting and not need any further PT intervention at discharge.

## 2019-12-09 NOTE — PROGRESS NOTES
1900: Received report from day shift nurse Christo Rashid. Reviewed SBAR, Kardex, I/O, MAR, Procedural information and Recent results. VSS, NSR (rhythm), RA (oxygenation). A/O X 4 Pt resting in bed/sitting up in the bed. Cath lab site: RR CDI, no bleeding/hematoma. Pt reporting no CP/SOB. 2000: Lovenox order administered. Urinal: 300ml clear, yellow urine. 2100: Evening meds given. B, no coverage needed. 2300: VSS, NSR, RA, no complaints. Pt resting in bed.  
 
0300: VSS, NSR, RA, no complaints. 600ml clear, yellow urine. Pt resting in bed.  
 
0700: Bedside and Verbal shift change report given to day shift nurse Brigitte (oncoming nurse) by Shantelle Nava RN (offgoing nurse). Report included the following information: SBAR, Kardex, Intake/Output, MAR, Procedural information, and Recent Results.

## 2019-12-09 NOTE — PROGRESS NOTES
Hospitalist Progress Note NAME: Janusz Moeller :  1980 MRN:  [id number] Admit date: 12/3/2019 Today's date: 19 PCP: Corbin Culver M.D. Cell 182- Assessment / Plan: 
 
Acute systolic CHF POA LVEF 16 to 20%(new diagnosis) CAD 3 vessel POA only RCA territory viable on cardiac MRI Essential HTN uncontrolled POA Left ventricular thrombus POA started on lovenox 1 mg/kg COPD POA Admitted with severe [address] CXR read as hyperinflated lungs, no ASD or edema, no wheezing May have some element of COPD, but DANIELSON mainly the heart Echocardiogram severe systolic dysfunction LVEF 16 - 20%. Reduced RV function as well Edema and dyspnea significantly improved with diuresis, now on PO 
BP improved = continue coreg and losartan, watch for low BP Cardiac cath = normal LM, 90% mid LAD, Left circ severe disease, 100% OM1 Distal RCA 80% Cardiac MRI 2019 LVEF 20%. There are 2 mural thrombus seen in the LV apex LAD territory is largely nonviable. LCx territory demonstrate very minimal viability RCA territory demonstrate significant viability without any infarct. No smoking LDL 69, start statin with the CAD Lovenox started for the clot 
      ? eliquis or coumadin at discharge, will check with Dr Millie Martin Cards planning for RCA stent D/C when cleared by cards D/W case management, will need assistance getting meds at discharge DM type 2 POA HgBa1c 6.1 Currently diet controlled Not currently on home medications Sliding scale with insulin BS 96, 97 
      84 Hypokalemia K 3.2 improved Serial labs ? Left leg cellulitis vs venous stasis changes POA History of diabetic ulcers, s/p right 3rd toe amputation No significant PVD based on my exam and PVRs unless distal disease Distal pulses 2+ in both legs on my exam, DARIO 1.14and 1.25 
 Vascular surgery consultation saw, appreciate Dr Marianela Nava input Continue wound care IV ceftriaxone, change to PO keflex to complete 7 days Needs podiatry follow up as outpatient, discussed with patient Tobacco use 1 PPD x 20 years CXR with hyperinflation Quit 1 month ago, stressed importance of quitting with patient Code Status: Full Surrogate Decision Maker:Yasemin Keene 
  
DVT Prophylaxis: Lovenox GI Prophylaxis: not indicated Subjective: Chief Complaint / Reason for Physician Visit f/u systolic CHF \"I feel a lot better\"  Discussed with RN events overnight. Notes significant improvement in his DANIELSON, much less edema Cardiac MRI with non viable LAD and left circ muscle, RCA viable Lovenox 1 mg/kg started for LV thrombus Cardiology planning for RCA stent Denied CP Review of Systems: 
Symptom Y/N Comments  Symptom Y/N Comments Fever/Chills n   Chest Pain Poor Appetite    Edema less improving Cough n   Abdominal Pain Sputum    Joint Pain SOB/DANIELSON y improving  Headache Nausea/vomit n   Tolerating PT/OT Diarrhea n   Tolerating Diet y Constipation    Other Could NOT obtain due to:   
 
Objective: VITALS:  
Last 24hrs VS reviewed since prior progress note. Most recent are: 
Patient Vitals for the past 24 hrs: 
 Temp Pulse Resp BP SpO2  
12/09/19 0725 97.2 °F (36.2 °C) 72 20 93/77 96 % 12/09/19 0300 97.4 °F (36.3 °C) 72 20 130/87 96 % 12/08/19 2300 97.3 °F (36.3 °C) 78 16 124/87 97 % 12/08/19 1900 97.6 °F (36.4 °C) 82 16 (!) 137/100 100 % 12/08/19 1337 97.3 °F (36.3 °C) 70 14 119/87 100 % Intake/Output Summary (Last 24 hours) at 12/9/2019 7064 Last data filed at 12/9/2019 2890 Gross per 24 hour Intake  Output 1550 ml Net -1550 ml Wt Readings from Last 12 Encounters:  
12/09/19 85.5 kg (188 lb 7.9 oz) 12/07/19 85.8 kg (189 lb 3.2 oz) 12/03/19 94.3 kg (208 lb)  
01/12/15 90.7 kg (200 lb) 12/01/14 90.7 kg (200 lb) 11/03/14 90.7 kg (200 lb) 10/01/14 90.7 kg (200 lb) 09/03/14 85.7 kg (189 lb)  
07/28/14 85.7 kg (189 lb) 07/07/14 85.7 kg (189 lb)  
06/16/14 85.7 kg (189 lb) 06/02/14 85.7 kg (189 lb) PHYSICAL EXAM: 
General: WD, WN. Alert, cooperative, no acute distress EENT:  PERRL. Anicteric sclerae. MMM Resp:  Few crackles at bases, no wheezing or rales. No accessory muscle use CV:  Regular  rhythm, trace edema GI:  Soft, Non distended, Non tender. +Bowel sounds, no rebound Neurologic:  Alert and oriented X 3, normal speech, non focal motor exam 
Psych:   Not anxious nor agitated Skin:  Erythema, mild warmth L > R calf 1-2 cm diabetic foot ulcer plantar right foot. No jaundice All photos 12/3 Reviewed most current lab test results and cultures  YES Reviewed most current radiology test results   YES Review and summation of old records today    NO Reviewed patient's current orders and MAR    YES 
PMH/ reviewed - no change compared to H&P 
________________________________________________________________________ Care Plan discussed with: 
  Comments Patient x Family RN x Care Manager x Consultant  x Cardiology Multidiciplinary team rounds were held today with , nursing, pharmacist and clinical coordinator. Patient's plan of care was discussed; medications were reviewed and discharge planning was addressed. ________________________________________________________________________ Comments >50% of visit spent in counseling and coordination of care    
________________________________________________________________________ Carmel Raman MD  
 
Procedures: see electronic medical records for all procedures/Xrays and details which were not copied into this note but were reviewed prior to creation of Plan. LABS: 
I reviewed today's most current labs and imaging studies. Pertinent labs include: 
Recent Labs 12/07/19 
3762 WBC 5.2 HGB 11.5* HCT 37.6  Recent Labs 12/08/19 
032 433 92 68 12/07/19 
8265  142  
K 4.2 3.9  109* CO2 25 25 GLU 90 83 BUN 24* 21* CREA 1.33* 1.23  
CA 8.1* 8.4* MG  --  2.3

## 2019-12-09 NOTE — PROGRESS NOTES
OCCUPATIONAL THERAPY EVALUATION/DISCHARGE Patient: Mecca Ospina (80 y.o. male) Date: 12/9/2019 Primary Diagnosis: CHF (congestive heart failure) ([facility] [address] [I50.9] Procedure(s) (LRB): LEFT HEART CATH / CORONARY ANGIOGRAPHY (N/A) 2 Days Post-Op Precautions: Falls ASSESSMENT Based on the objective data described below, the patient presents at an overall CGA to Independent level for basic self-care tasks. He is primarily limited by impaired balance, which is being addressed by physical therapy. No SOB noted during activity and vital signs were stable throughout. Educated patient on safety and fall prevention during ADL. No further skilled OT treatment is indicated at this time. [facility] Functional Outcome Measure: The patient scored 70/100 on the Barthel Index. Other factors to consider for discharge: N/A  
 
PLAN : 
Recommendation for discharge: (in order for the patient to meet his/her long term goals) No skilled occupational therapy/ follow up rehabilitation needs identified at this time. This discharge recommendation: 
Has not yet been discussed the attending provider and/or case management IF patient discharges home will need the following DME: none SUBJECTIVE:  
Patient stated This is the most I've moved in 2 weeks.  OBJECTIVE DATA SUMMARY:  
HISTORY:  
Past Medical History:  
Diagnosis Date  Diabetes ([facility] [address] Past Surgical History:  
Procedure Laterality Date  HX AMPUTATION  10/02/2012 Right 3rd toe Prior Level of Function/Environment/Context: Independent until recently (limited by shortness of breath) Expanded or extensive additional review of patient history:  
Home Situation Home Environment: Trailer/mobile home # Steps to Enter: 4 Rails to Enter: Yes Hand Rails : Bilateral 
One/Two Story Residence: One story Living Alone: No 
Support Systems: Family member(s) Patient Expects to be Discharged to[de-identified] Private residence Current DME Used/Available at Home: None Tub or Shower Type: Tub/Shower combination Hand dominance: Right EXAMINATION OF PERFORMANCE DEFICITS: 
Cognitive/Behavioral Status: 
Neurologic State: Alert Orientation Level: Oriented X4 Cognition: Appropriate for age attention/concentration; Follows commands Perception: Appears intact Perseveration: No perseveration noted Safety/Judgement: Awareness of environment; Insight into deficits; Decreased awareness of need for safety Skin: No visible issues Edema: None Hearing: Auditory Auditory Impairment: None Vision/Perceptual:   
    
    
    
  
    
Acuity: Within Defined Limits(grossly; reports he needs an updated prescription) Corrective Lenses: Glasses(for distance) Range of Motion: 
AROM: Within functional limits(B UE) PROM: Within functional limits(B UE) Strength: 
Strength: Within functional limits(B UE) Coordination: 
Coordination: Within functional limits Fine Motor Skills-Upper: Left Intact; Right Intact Gross Motor Skills-Upper: Left Intact; Right Intact Tone & Sensation: 
Tone: Normal 
Sensation: Impaired (numbness B feet) Balance: 
Sitting: Intact Standing: Impaired Standing - Static: Good Standing - Dynamic : Fair Functional Mobility and Transfers for ADLs: 
Bed Mobility: 
  
 
Transfers: 
Sit to Stand: Contact guard assistance Stand to Sit: Contact guard assistance Bed to Chair: Contact guard assistance Bathroom Mobility: Contact guard assistance Toilet Transfer : Contact guard assistance Assistive Device : Homeloc ADL Assessment: 
Feeding: Independent Oral Facial Hygiene/Grooming: Independent(seated) Bathing: Stand-by assistance(simulated) Upper Body Dressing: Independent Lower Body Dressing: Contact guard assistance Toileting: Contact guard assistance ADL Intervention and task modifications: Discussed safety and fall prevention during ADL. Encouraged him to slow down, especially when turning. Educated him on the importance of calling for assistance before getting up to avoid falls. Cognitive Retraining Safety/Judgement: Awareness of environment; Insight into deficits; Decreased awareness of need for safety Functional Measure: 
Barthel Index: 
 
Bathin Bladder: 10 Bowels: 10 
Groomin Dressin Feeding: 10 Mobility: 10 Stairs: 0 Toilet Use: 5 Transfer (Bed to Chair and Back): 10 Total: 70/100 The Barthel ADL Index: Guidelines 1. The index should be used as a record of what a patient does, not as a record of what a patient could do. 2. The main aim is to establish degree of independence from any help, physical or verbal, however minor and for whatever reason. 3. The need for supervision renders the patient not independent. 4. A patient's performance should be established using the best available evidence. Asking the patient, friends/relatives and nurses are the usual sources, but direct observation and common sense are also important. However direct testing is not needed. 5. Usually the patient's performance over the preceding 24-48 hours is important, but occasionally longer periods will be relevant. 6. Middle categories imply that the patient supplies over 50 per cent of the effort. 7. Use of aids to be independent is allowed. Stephany Tompkins., Barthel, D.W. (7552). Functional evaluation: the Barthel Index. [facility] (14)2. SONJA Patel, [place]., [facility] [place], [address] [place] [address] (). Measuring the change indisability after inpatient rehabilitation; comparison of the responsiveness of the Barthel Index and Functional [place] Measure. Journal of Neurology, Neurosurgery, and Psychiatry, 66(4), 242-261.  
Keven Patel, N.J.A, FOX Vee, Fidel Andrews MSTEFANY. (2004.) Assessment of post-stroke quality of life in cost-effectiveness studies: The usefulness of the Barthel Index and the EuroQoL-5D. [facility], 13, [phone] Occupational Therapy Evaluation Charge Determination History Examination Decision-Making LOW Complexity : Brief history review  LOW Complexity : 1-3 performance deficits relating to physical, cognitive , or psychosocial skils that result in activity limitations and / or participation restrictions  LOW Complexity : No comorbidities that affect functional and no verbal or physical assistance needed to complete eval tasks Based on the above components, the patient evaluation is determined to be of the following complexity level: LOW Pain Rating: No complaints Activity Tolerance:  
Fair Please refer to the flowsheet for vital signs taken during this treatment. After treatment patient left in no apparent distress:   
Sitting in chair and Call bell within reach COMMUNICATION/EDUCATION:  
The patients plan of care was discussed with: Physical Therapist and Registered Nurse. Thank you for this referral. 
Arnoldo Jolly OTR/L Time Calculation: 20 mins

## 2019-12-09 NOTE — PROGRESS NOTES
Problem: Heart Failure: Day 4 Goal: Off Pathway (Use only if patient is Off Pathway) Outcome: Resolved/Met Goal: Activity/Safety Outcome: Resolved/Met Goal: Diagnostic Test/Procedures Outcome: Resolved/Met Goal: Nutrition/Diet Outcome: Resolved/Met Goal: Discharge Planning Outcome: Resolved/Met Goal: Medications Outcome: Resolved/Met Goal: Respiratory Outcome: Resolved/Met Goal: Treatments/Interventions/Procedures Outcome: Resolved/Met Goal: Psychosocial 
Outcome: Resolved/Met Goal: *Oxygen saturation within defined limits Outcome: Resolved/Met Goal: *Hemodynamically stable Outcome: Resolved/Met Goal: *Optimal pain control at patient's stated goal 
Outcome: Resolved/Met Goal: *Anxiety reduced or absent Outcome: Resolved/Met Goal: *Demonstrates progressive activity Outcome: Resolved/Met Problem: Heart Failure: Day 5 Goal: Off Pathway (Use only if patient is Off Pathway) Outcome: Progressing Towards Goal 
Goal: Activity/Safety Outcome: Progressing Towards Goal 
Goal: Diagnostic Test/Procedures Outcome: Progressing Towards Goal 
Goal: Nutrition/Diet Outcome: Progressing Towards Goal 
Goal: Discharge Planning Outcome: Progressing Towards Goal 
Goal: Medications Outcome: Progressing Towards Goal 
Goal: Respiratory Outcome: Progressing Towards Goal 
Goal: Treatments/Interventions/Procedures Outcome: Progressing Towards Goal 
Goal: Psychosocial 
Outcome: Progressing Towards Goal 
  
Problem: Falls - Risk of 
Goal: *Absence of Falls Description Document Redgie Curet Fall Risk and appropriate interventions in the flowsheet. Outcome: Progressing Towards Goal 
Note: Fall Risk Interventions: 
Mobility Interventions: Assess mobility with egress test 
 
  
 
Medication Interventions: Teach patient to arise slowly, Patient to call before getting OOB Elimination Interventions: Call light in reach History of Falls Interventions: Bed/chair exit alarm Problem: Risk for Spread of Infection Goal: Prevent transmission of infectious organism to others Description Prevent the transmission of infectious organisms to other patients, staff members, and visitors. Outcome: Resolved/Met Problem: Patient Education:  Go to Education Activity Goal: Patient/Family Education Outcome: Resolved/Met Problem: Patient Education: Go to Patient Education Activity Goal: Patient/Family Education Outcome: Progressing Towards Goal 
  
Problem: Pressure Injury - Risk of 
Goal: *Prevention of pressure injury Description Document Shukri Scale and appropriate interventions in the flowsheet. Outcome: Progressing Towards Goal 
Note: Pressure Injury Interventions: 
Sensory Interventions: Assess changes in LOC Moisture Interventions: Absorbent underpads Activity Interventions: Increase time out of bed Mobility Interventions: PT/OT evaluation, HOB 30 degrees or less Nutrition Interventions: Offer support with meals,snacks and hydration Friction and Shear Interventions: Feet elevated on foot rest, Apply protective barrier, creams and emollients, HOB 30 degrees or less, Foam dressings/transparent film/skin sealants

## 2019-12-09 NOTE — PROGRESS NOTES
Problem: Falls - Risk of 
Goal: *Absence of Falls Description Document Clide Failing Fall Risk and appropriate interventions in the flowsheet. Outcome: Progressing Towards Goal 
Note: Fall Risk Interventions: 
Mobility Interventions: Bed/chair exit alarm Medication Interventions: Bed/chair exit alarm Elimination Interventions: Bed/chair exit alarm, Call light in reach History of Falls Interventions: Bed/chair exit alarm Problem: Patient Education: Go to Patient Education Activity Goal: Patient/Family Education Outcome: Progressing Towards Goal

## 2019-12-09 NOTE — PROGRESS NOTES
Spiritual Care Partner Volunteer visited patient in [facility] on 12.9. 19. Documented by: 
 
Rev. Jordi Sylvester EdD MDiv  [facility] Page [phone])

## 2019-12-09 NOTE — CARDIO/PULMONARY
Cardiac Rehab Note: chart review Adm CHF 
 
EF 16-20%  on echo per 12/4/2019 Smoking history assessed. Patient is a former smoker. States he quit about a month ago. Current inpatient diet: DIET ONE TIME MESSAGE 
DIET DIABETIC CONSISTENT CARB \"Living with Heart Failure\" book with daily weight calendar and s/s of heart failure magnet provided to Lori Adhikari on 12/9/2019 Lives with father and sister and they usually get groceries. States he has stopped shaking the salt shaker and pays attention to sugars and carbs-reminded no soups,deli meats,or processed foods and try to eat foods with 7 or less ingredients. Educated using teach back method. Instruction given on s/s of CHF, checking weight every am and calling MD if weight is up 2-3 lbs in a day or 5 lbs in a week (or as directed by the physician), fluid/Na restrictions, s/s of worsening CHF and when to call MD. Reviewed activity as tolerated with frequent rest periods as needed, taking medications as prescribed, and the importance of follow up visits with physician. Mentioned importance of Cardiac rehab-pt lives in [place]. States he will try to commit to walking at home as he [place] and driveway is about a mile and he walks his dog. Encouraged to do this 3-5 x week for 30 min. For Cardiac Cath with PCI on Wednesday per Dr. Mat Yost note this morning. Will follow post cath as well. Lori Adhikari verbalized understanding with questions answered.   Eran Massey RN

## 2019-12-09 NOTE — PROGRESS NOTES
Problem: Mobility Impaired (Adult and Pediatric) Goal: *Acute Goals and Plan of Care (Insert Text) Description FUNCTIONAL STATUS PRIOR TO ADMISSION: Patient was independent and active without use of DME, but limited activity due to SOB. Pt reports he stayed home and cared for the dog. HOME SUPPORT PRIOR TO ADMISSION: The patient lived with Father and Sister, who work during the day and were unavailable to assist pt. . 
 
Physical Therapy Goals Initiated 12/9/2019 1. Patient will transfer from bed to chair and chair to bed with modified independence using the least restrictive device within 7 day(s). 2.  Patient will perform sit to stand with modified independence within 7 day(s). 3.  Patient will ambulate with independence for 250 feet with the least restrictive device within 7 day(s). 5.  Patient will ascend/descend 4 stairs with 1-2 handrail(s) with modified independence within 7 day(s). Outcome: Progressing Towards Goal 
 PHYSICAL THERAPY EVALUATION Patient: Ham León (14 y.o. male) Date: 12/9/2019 Primary Diagnosis: CHF (congestive heart failure) ([facility] [address] [I50.9] Procedure(s) (LRB): LEFT HEART CATH / CORONARY ANGIOGRAPHY (N/A) 2 Days Post-Op Precautions:     
 
 
ASSESSMENT Based on the objective data described below, the patient presents with generalized weakness, impaired dynamic standing balance, impaired gait,a and decreased activity tolerance. Pt demonstrates all mobility at sba to cga, but requires increased assistance with ambulation. No ad used. Pt with 3 losses of balance, but only required physical assistance to recover from 1 lob. Pt should progress well with PT intervention in the acute setting to address LE strength and general weakness. Pt instructed to be OOB most of the day. Current Level of Function Impacting Discharge (mobility/balance): cga to min a x 1, min/mod a x 1 with lob Functional Outcome Measure:   The patient scored 70/100 on the Barthel Index outcome measure which is indicative of 30% impaired function/adls. Other factors to consider for discharge:  I at baseline Patient will benefit from skilled therapy intervention to address the above noted impairments. PLAN : 
Recommendations and Planned Interventions: transfer training, gait training, therapeutic exercises, patient and family training/education and therapeutic activities Frequency/Duration: Patient will be followed by physical therapy:  4 times a week to address goals. Recommendation for discharge: (in order for the patient to meet his/her long term goals) No skilled physical therapy/ follow up rehabilitation needs identified at this time. Pt will be seen in the acute setting. This discharge recommendation: A follow-up discussion with the attending provider and/or case management is planned IF patient discharges home will need the following DME: none SUBJECTIVE:  
Patient stated I am just weak from lying around for a week.  OBJECTIVE DATA SUMMARY:  
HISTORY:   
Past Medical History:  
Diagnosis Date  Diabetes ([facility] [address] Past Surgical History:  
Procedure Laterality Date  HX AMPUTATION  10/02/2012 Right 3rd toe Personal factors and/or comorbidities impacting plan of care: medical status Home Situation Home Environment: Trailer/mobile home # Steps to Enter: 4 Rails to Enter: (P) Yes Hand Rails : (P) Bilateral 
One/Two Story Residence: One story Living Alone: No 
Support Systems: (P) Family member(s) Patient Expects to be Discharged to[de-identified] Private residence Current DME Used/Available at Home: None Tub or Shower Type: (P) Tub/Shower combination EXAMINATION/PRESENTATION/DECISION MAKING:  
Critical Behavior: 
Neurologic State: Alert Orientation Level: Oriented X4 Cognition: Appropriate decision making, Appropriate safety awareness, Follows commands Hearing: Auditory Auditory Impairment: None Range Of Motion: AROM: Generally decreased, functional 
  
  
  
PROM: Generally decreased, functional 
  
  
  
Strength:   
Strength: Generally decreased, functional 
  
  
  
  
  
  
Tone & Sensation:  
Tone: Normal 
  
  
  
  
Sensation: Impaired Coordination: 
Coordination: Within functional limits Functional Mobility: 
Bed Mobility: 
 deferred, pt seated in bedside chair Transfers: 
Sit to Stand: Contact guard assistance Stand to Sit: Contact guard assistance Bed to Chair: Contact guard assistance Balance:  
Sitting: Intact Standing: Impaired Standing - Static: Good Standing - Dynamic : Fair Ambulation/Gait Training: 
Distance (ft): 150 Feet (ft) Assistive Device: Gait belt Ambulation - Level of Assistance: Contact guard assistance;Minimal assistance;Assist x1(min to mod a x 1 wth lob) Gait Description (WDL): Exceptions to [facility] Gait Abnormalities: Decreased step clearance; Path deviations;Scissoring Base of Support: Narrowed Speed/Vicky: Pace decreased (<100 feet/min) Step Length: Right shortened;Left shortened Functional Measure: 
  
Physical Therapy Evaluation Charge Determination History Examination Presentation Decision-Making MEDIUM  Complexity : 1-2 comorbidities / personal factors will impact the outcome/ POC  LOW Complexity : 1-2 Standardized tests and measures addressing body structure, function, activity limitation and / or participation in recreation  MEDIUM Complexity : Evolving with changing characteristics  LOW Complexity : FOTO score of  Based on the above components, the patient evaluation is determined to be of the following complexity level: LOW Pain Rating: 
None reported Activity Tolerance:  
Good and Fair Please refer to the flowsheet for vital signs taken during this treatment. After treatment patient left in no apparent distress:  
Sitting in chair and Call bell within reach COMMUNICATION/EDUCATION:  
The patients plan of care was discussed with: Occupational Therapist and Registered Nurse. Fall prevention education was provided and the patient/caregiver indicated understanding., Patient/family have participated as able in goal setting and plan of care. and Patient/family agree to work toward stated goals and plan of care. Thank you for this referral. 
Lissy Zhao, PT Time Calculation: 18 mins

## 2019-12-10 LAB
ANION GAP SERPL CALC-SCNC: 11 MMOL/L (ref 5–15)
BASOPHILS # BLD: 0 K/UL (ref 0–0.1)
BASOPHILS NFR BLD: 0 % (ref 0–1)
BUN SERPL-MCNC: 21 MG/DL (ref 6–20)
BUN/CREAT SERPL: 21 (ref 12–20)
CALCIUM SERPL-MCNC: 8.9 MG/DL (ref 8.5–10.1)
CHLORIDE SERPL-SCNC: 107 MMOL/L (ref 97–108)
CO2 SERPL-SCNC: 20 MMOL/L (ref 21–32)
CREAT SERPL-MCNC: 0.98 MG/DL (ref 0.7–1.3)
DIFFERENTIAL METHOD BLD: ABNORMAL
EOSINOPHIL # BLD: 0.2 K/UL (ref 0–0.4)
EOSINOPHIL NFR BLD: 2 % (ref 0–7)
ERYTHROCYTE [DISTWIDTH] IN BLOOD BY AUTOMATED COUNT: 17.8 % (ref 11.5–14.5)
GLUCOSE BLD STRIP.AUTO-MCNC: 125 MG/DL (ref 65–100)
GLUCOSE BLD STRIP.AUTO-MCNC: 135 MG/DL (ref 65–100)
GLUCOSE BLD STRIP.AUTO-MCNC: 144 MG/DL (ref 65–100)
GLUCOSE BLD STRIP.AUTO-MCNC: 88 MG/DL (ref 65–100)
GLUCOSE SERPL-MCNC: 89 MG/DL (ref 65–100)
HCT VFR BLD AUTO: 35.8 % (ref 36.6–50.3)
HGB BLD-MCNC: 11.1 G/DL (ref 12.1–17)
IMM GRANULOCYTES # BLD AUTO: 0 K/UL (ref 0–0.04)
IMM GRANULOCYTES NFR BLD AUTO: 0 % (ref 0–0.5)
LYMPHOCYTES # BLD: 0.8 K/UL (ref 0.8–3.5)
LYMPHOCYTES NFR BLD: 10 % (ref 12–49)
MCH RBC QN AUTO: 23.3 PG (ref 26–34)
MCHC RBC AUTO-ENTMCNC: 31 G/DL (ref 30–36.5)
MCV RBC AUTO: 75.1 FL (ref 80–99)
MONOCYTES # BLD: 0.3 K/UL (ref 0–1)
MONOCYTES NFR BLD: 4 % (ref 5–13)
NEUTS SEG # BLD: 6.6 K/UL (ref 1.8–8)
NEUTS SEG NFR BLD: 83 % (ref 32–75)
NRBC # BLD: 0 K/UL (ref 0–0.01)
NRBC BLD-RTO: 0 PER 100 WBC
PLATELET # BLD AUTO: 177 K/UL (ref 150–400)
PMV BLD AUTO: 12.4 FL (ref 8.9–12.9)
POTASSIUM SERPL-SCNC: 3.6 MMOL/L (ref 3.5–5.1)
RBC # BLD AUTO: 4.77 M/UL (ref 4.1–5.7)
SERVICE CMNT-IMP: ABNORMAL
SERVICE CMNT-IMP: NORMAL
SODIUM SERPL-SCNC: 138 MMOL/L (ref 136–145)
WBC # BLD AUTO: 8 K/UL (ref 4.1–11.1)

## 2019-12-10 PROCEDURE — 74011250637 HC RX REV CODE- 250/637: Performed by: NURSE PRACTITIONER

## 2019-12-10 PROCEDURE — 97530 THERAPEUTIC ACTIVITIES: CPT

## 2019-12-10 PROCEDURE — 74011250637 HC RX REV CODE- 250/637: Performed by: INTERNAL MEDICINE

## 2019-12-10 PROCEDURE — 85025 COMPLETE CBC W/AUTO DIFF WBC: CPT

## 2019-12-10 PROCEDURE — 65660000000 HC RM CCU STEPDOWN

## 2019-12-10 PROCEDURE — 36415 COLL VENOUS BLD VENIPUNCTURE: CPT

## 2019-12-10 PROCEDURE — 74011250636 HC RX REV CODE- 250/636: Performed by: INTERNAL MEDICINE

## 2019-12-10 PROCEDURE — 97116 GAIT TRAINING THERAPY: CPT

## 2019-12-10 PROCEDURE — 82962 GLUCOSE BLOOD TEST: CPT

## 2019-12-10 PROCEDURE — 80048 BASIC METABOLIC PNL TOTAL CA: CPT

## 2019-12-10 RX ORDER — ENOXAPARIN SODIUM 100 MG/ML
1 INJECTION SUBCUTANEOUS EVERY 12 HOURS
Status: DISCONTINUED | OUTPATIENT
Start: 2019-12-10 | End: 2019-12-11

## 2019-12-10 RX ADMIN — CEPHALEXIN 500 MG: 250 CAPSULE ORAL at 14:19

## 2019-12-10 RX ADMIN — Medication 10 ML: at 06:49

## 2019-12-10 RX ADMIN — CEPHALEXIN 500 MG: 250 CAPSULE ORAL at 22:08

## 2019-12-10 RX ADMIN — TICAGRELOR 90 MG: 90 TABLET ORAL at 20:26

## 2019-12-10 RX ADMIN — CEPHALEXIN 500 MG: 250 CAPSULE ORAL at 17:48

## 2019-12-10 RX ADMIN — ATORVASTATIN CALCIUM 40 MG: 40 TABLET, FILM COATED ORAL at 22:08

## 2019-12-10 RX ADMIN — Medication 10 ML: at 14:20

## 2019-12-10 RX ADMIN — ENOXAPARIN SODIUM 80 MG: 80 INJECTION SUBCUTANEOUS at 20:26

## 2019-12-10 RX ADMIN — ENOXAPARIN SODIUM 90 MG: 100 INJECTION SUBCUTANEOUS at 08:21

## 2019-12-10 RX ADMIN — ASPIRIN 81 MG: 81 TABLET, COATED ORAL at 08:18

## 2019-12-10 RX ADMIN — TICAGRELOR 90 MG: 90 TABLET ORAL at 08:19

## 2019-12-10 RX ADMIN — CARVEDILOL 12.5 MG: 12.5 TABLET, FILM COATED ORAL at 08:19

## 2019-12-10 RX ADMIN — CEPHALEXIN 500 MG: 250 CAPSULE ORAL at 08:18

## 2019-12-10 RX ADMIN — FUROSEMIDE 40 MG: 40 TABLET ORAL at 17:48

## 2019-12-10 RX ADMIN — LOSARTAN POTASSIUM 25 MG: 25 TABLET, FILM COATED ORAL at 08:18

## 2019-12-10 RX ADMIN — Medication 10 ML: at 22:09

## 2019-12-10 RX ADMIN — FUROSEMIDE 40 MG: 40 TABLET ORAL at 08:19

## 2019-12-10 RX ADMIN — POTASSIUM CHLORIDE 10 MEQ: 750 TABLET, FILM COATED, EXTENDED RELEASE ORAL at 08:19

## 2019-12-10 RX ADMIN — CARVEDILOL 12.5 MG: 12.5 TABLET, FILM COATED ORAL at 17:48

## 2019-12-10 RX ADMIN — POLYMYXIN B SULFATE, BACITRACIN ZINC, NEOMYCIN SULFATE: 5000; 3.5; 4 OINTMENT TOPICAL at 08:19

## 2019-12-10 NOTE — PROGRESS NOTES
Problem: Mobility Impaired (Adult and Pediatric) Goal: *Acute Goals and Plan of Care (Insert Text) Description FUNCTIONAL STATUS PRIOR TO ADMISSION: Patient was independent and active without use of DME, but limited activity due to SOB. Pt reports he stayed home and cared for the dog. HOME SUPPORT PRIOR TO ADMISSION: The patient lived with Father and Sister, who work during the day and were unavailable to assist pt. . 
 
Physical Therapy Goals Initiated 12/9/2019 1. Patient will transfer from bed to chair and chair to bed with modified independence using the least restrictive device within 7 day(s). 2.  Patient will perform sit to stand with modified independence within 7 day(s). 3.  Patient will ambulate with independence for 250 feet with the least restrictive device within 7 day(s). 5.  Patient will ascend/descend 4 stairs with 1-2 handrail(s) with modified independence within 7 day(s). 12/10/2019 1316 by Jack Barr PTA Outcome: Resolved/Met PHYSICAL THERAPY TREATMENT Patient: Amanda Garcia (12 y.o. male) Date: 12/10/2019 Diagnosis: CHF (congestive [facility] failure) [facility] [I50.9] <principal problem not specified> Procedure(s) (LRB): LEFT HEART CATH / CORONARY ANGIOGRAPHY (N/A) 3 Days Post-Op Precautions:   
Chart, physical therapy assessment, plan of care and goals were reviewed. ASSESSMENT Patient continues with skilled PT services and is progressing towards goals. Pt presents with decreased strength and balance. Pt performed all  bed mobility independently. Pt ambulated 200ft with no device at CGA. Pt with initial instability but improving with time. Pt ascended and descended 7 steps with right raling at CGA step over step. Pt moving well and educated on increasing mobility throughout the day by walking in hallway with nursing  and staying out of bed most of the day to improved endurance and balance. Pt with understanding. Pt with no further acute pt needs. Current Level of Function Impacting Discharge (mobility/balance): bed mobility independent, ambulating at CGA. Other factors to consider for discharge: none PLAN : 
Patient continues to benefit from skilled intervention to address the above impairments. Continue treatment per established plan of care. to address goals. Recommendation for discharge: (in order for the patient to meet his/her long term goals) No skilled physical therapy/ follow up rehabilitation needs identified at this time. This discharge recommendation: 
Has been made in collaboration with the attending provider and/or case management IF patient discharges home will need the following DME: none SUBJECTIVE:  
Patient stated  I couldn't have walked this far last week.  OBJECTIVE DATA SUMMARY:  
Critical Behavior: 
Neurologic State: Alert Orientation Level: Oriented X4 Cognition: Appropriate decision making, Appropriate safety awareness, Follows commands Safety/Judgement: Awareness of environment, Insight into deficits, Decreased awareness of need for safety Functional Mobility Training: 
Bed Mobility: 
  
Supine to Sit: Independent Sit to Supine: Independent Scooting: Independent Transfers: 
Sit to Stand: Stand-by assistance Stand to Sit: Stand-by assistance Balance: 
Sitting: Intact Standing: Impaired Standing - Static: Good Standing - Dynamic : Fair Ambulation/Gait Training: 
Distance (ft): 200 Feet (ft) Assistive Device: Gait belt Ambulation - Level of Assistance: Contact guard assistance Gait Abnormalities: Decreased step clearance;Trunk sway increased; Path deviations Base of Support: Narrowed Speed/Vicky: Pace decreased (<100 feet/min) Step Length: Right shortened;Left shortened Stairs: 
Number of Stairs Trained: 7 Stairs - Level of Assistance: Contact guard assistance;Stand-by assistance Rail Use: Right Pain Rating: Pt with no complaints of pain Activity Tolerance:  
Good with improved mobiliyt tolerance and safety. Please refer to the flowsheet for vital signs taken during this treatment. After treatment patient left in no apparent distress:  
Supine in bed, Call bell within reach, and Side rails x 3 sitting EOB 
 
COMMUNICATION/COLLABORATION:  
The patients plan of care was discussed with: Registered Nurse Ivonne Webster PTA Time Calculation: 24 mins

## 2019-12-10 NOTE — PROGRESS NOTES
Pharmacy  Enoxaparin (Lovenox®) Monitoring Indication: LV thrombus Current Dose: Enoxaparin 90 mg subcutaneously every 12 hours Creatinine Clearance (mL/min): 84 mL/min Weight: 83.3 kg Labs: 
Recent Labs 12/10/19 
0316 12/08/19 
0602 CREA 0.98 1.33* HGB 11.1*  --   
  -- Wt Readings from Last 1 Encounters:  
12/10/19 83.3 kg (183 lb 10.3 oz) Ht Readings from Last 1 Encounters:  
12/06/19 185.4 cm (73\") Impression/Plan:  
· Weight updated so dose changed to 80 mg every 12 hours Thanks, Jose Luis Mcallister, PHARMD

## 2019-12-10 NOTE — PROGRESS NOTES
Initial Nutrition Assessment: 
 
INTERVENTIONS/RECOMMENDATIONS:  
· Continue consistent carb diet, could add Na restriction ASSESSMENT:  
Chart reviewed, medically noted for Biventricular failure, CAD, ICM, RCA PCI tomorrow, HTN, and DM. Assessing pt due to LOS. Pt reports good appetite and eating well. Cardiac rehab RN has already provided heart healthy diet education. Pt did not have any further questions on that material. DM diet education was offered but patient politely declined at this time. BG are fairly well controlled with HbA1c of 6.1 Past Medical History:  
Diagnosis Date  Diabetes ([address] Diet Order: Consistent carb 
% Eaten:  No data found. Pertinent Medications: [x]Reviewed: lasix, humalog, KCl, Pertinent Labs: [x]Reviewed:  
Food Allergies: [x]NKFA  []Other Last BM: ? 
Edema:   n/a     []RUE   []LUE   []RLE   []LLE Pressure Injury: full thickness left food wound     [] Stage I   [] Stage II   [] Stage III   [] Stage IV Wt Readings from Last 30 Encounters:  
12/10/19 83.3 kg (183 lb 10.3 oz) 12/07/19 85.8 kg (189 lb 3.2 oz) 12/03/19 94.3 kg (208 lb)  
01/12/15 90.7 kg (200 lb) 12/01/14 90.7 kg (200 lb) 11/03/14 90.7 kg (200 lb) 10/01/14 90.7 kg (200 lb) 09/03/14 85.7 kg (189 lb)  
07/28/14 85.7 kg (189 lb) 07/07/14 85.7 kg (189 lb)  
06/16/14 85.7 kg (189 lb) 06/02/14 85.7 kg (189 lb) 04/21/14 85.7 kg (189 lb)  
03/24/14 85.7 kg (189 lb) 02/03/14 85.7 kg (189 lb) 01/06/14 85.7 kg (189 lb) 12/04/13 85.7 kg (189 lb) 11/06/13 85.7 kg (189 lb) 10/20/13 85.7 kg (189 lb) Anthropometrics:  
Height: 6' 1\" (185.4 cm) Weight: 83.3 kg (183 lb 10.3 oz) IBW (%IBW):   ( ) UBW (%UBW):   (  %) Last Weight Metrics: 
Weight Loss Metrics 12/10/2019 12/3/2019 12/3/2019 12/3/2019 1/12/2015 12/1/2014 11/3/2014 Today's Wt 183 lb 10.3 oz - 208 lb - 200 lb 200 lb 200 lb BMI - 24.23 kg/m2 - 27.44 kg/m2 28.7 kg/m2 28.7 kg/m2 28.7 kg/m2 BMI: Body mass index is 24.23 kg/m². This BMI is indicative of: 
 []Underweight    [x]Normal    []Overweight    [] Obesity   [] Extreme Obesity (BMI>40) Estimated Nutrition Needs (Based on):  
2150 Kcals/day(BMR: 1800 x 1.2) , 65 g(0.8 g/kg) Protein Carbohydrate: At Least 130 g/day  Fluids: 2150 mL/day (1ml/kcal) or per primary team 
 
NUTRITION DIAGNOSES:  
Problem:  No nutritional diagnosis at this time Etiology: related to Signs/Symptoms: as evidenced by NUTRITION INTERVENTIONS: 
Meals/Snacks: General/healthful diet GOAL:  
consume >75% of meals and ONS in 5-7 days LEARNING NEEDS (Diet, Food/Nutrient-Drug Interaction):  
 [] None Identified 
 [] Identified and Education Provided/Documented 
 [x] Identified and Pt declined/was not appropriate (declined DM diet education, cardiac rehab RN has already provide cardiac education) Cultureal, Gnosticist, OR Ethnic Dietary Needs:  
 [x] None Identified 
 [] Identified and Addressed 
 
 [x] Interdisciplinary Care Plan Reviewed/Documented  
 [x] Discharge Planning: Consistent carb, low Na diet MONITORING /EVALUATION:  
  
Food/Nutrient Intake Outcomes: Total energy intake Physical Signs/Symptoms Outcomes: Weight/weight change, Electrolyte and renal profile, Glucose profile, GI 
 
NUTRITION RISK:  
 [] High              [] Moderate           [x]  Low  []  Minimal/Uncompromised PT SEEN FOR:  
 []  MD Consult: []Calorie Count []Diabetic Diet Education []Diet Education []Electrolyte Management []General Nutrition Management and Supplements []Management of Tube Feeding []TPN Recommendations []  RN Referral:  []MST score >=2 
   []Enteral/Parenteral Nutrition PTA []Pregnant: Gestational DM or Multigestation 
   []Pressure Ulcer/Wound Care needs 
     
[]  Low BMI [x]  LOS Referral  
 
 
Jessica Lewis RDN Pager [phone] Weekend Pager [phone]

## 2019-12-10 NOTE — PROGRESS NOTES
1900: 1900: Received report from day shift nurse Brigitte. Reviewed SBAR, Kardex, I/O, MAR, Procedural information and Recent results. VSS, NSR (rhythm), RA (oxygenation). A/O X 4 Pt resting in bed/sitting up in the bed. Cath lab site: R radial CDI, no bleeding/hematoma. Pt reporting no CP/SOB. 2100: Evening meds given. No coverage needed, .  
 
2300: Pt tachypneic, pt reports no sx of SOB, NSR, RA, no complaints. HOB raised, will continue to assess need for oxygen or respiratory care. 0300: Pt ambulated from the bed to the chair. VSS, NSR, RA, no complaints. Labs drawn.

## 2019-12-10 NOTE — PROGRESS NOTES
0700: Bedside and Verbal shift change report given to day shift nurse Brigitte (oncoming nurse) by Lazaro Jenkins RN (offgoing nurse). Report included the following information: SBAR, Kardex, Intake/Output, MAR, Procedural information, and Recent Results.

## 2019-12-10 NOTE — PROGRESS NOTES
JAMI 
Home with family New PCP - scheduled with [facility] 
OP f/u with PCP and Cardiologist 
 
Chart review. Patient is scheduled for PCI 12/11/2019. Life Vest is pending per Christel Davis NP. Patient is Medicaid Pending. No payor source at this time. Patient will need assistance with discharge medications. CM will continue to follow for discharge needs. Odilia Delacruz RN  Ext [phone]

## 2019-12-10 NOTE — PROGRESS NOTES
Problem: Falls - Risk of 
Goal: *Absence of Falls Description Document Andrew President Fall Risk and appropriate interventions in the flowsheet. Outcome: Progressing Towards Goal 
Note: Fall Risk Interventions: 
Mobility Interventions: Assess mobility with egress test 
 
  
 
Medication Interventions: Teach patient to arise slowly, Patient to call before getting OOB Elimination Interventions: Call light in reach, Urinal in reach History of Falls Interventions: Bed/chair exit alarm Problem: Pressure Injury - Risk of 
Goal: *Prevention of pressure injury Description Document Shukri Scale and appropriate interventions in the flowsheet. Outcome: Progressing Towards Goal 
Note: Pressure Injury Interventions: 
Sensory Interventions: Assess changes in LOC Moisture Interventions: Absorbent underpads Activity Interventions: PT/OT evaluation Mobility Interventions: PT/OT evaluation Nutrition Interventions: Offer support with meals,snacks and hydration Friction and Shear Interventions: Lift sheet

## 2019-12-10 NOTE — PROGRESS NOTES
Hospitalist Progress Note NAME: Carlton Velasquez :  1980 MRN:  [id number] Assessment / Plan: 
Acute systolic CHF POA LVEF 16 to 20%(new diagnosis) CAD 3 vessel POA only RCA territory viable on cardiac MRI Essential HTN uncontrolled POA Left ventricular thrombus POA started on lovenox 1 mg/kg COPD POA Admitted with severe [address] CXR read as hyperinflated lungs, no ASD or edema, no wheezing May have some element of COPD, but DANIELSON mainly the heart Echocardiogram severe systolic dysfunction LVEF 16 - 20%. Reduced RV function as well Edema and dyspnea significantly improved with diuresis, now on PO 
BP improved = continue coreg and losartan, watch for low BP Cardiac cath = normal LM, 90% mid LAD, Left circ severe disease, 100% OM1 Distal RCA 80% Cardiac MRI 2019 LVEF 20%. There are 2 mural thrombus seen in the LV apex LAD territory is largely nonviable. LCx territory demonstrate very minimal viability RCA territory demonstrate significant viability without any infarct. No smoking LDL 69, start statin with the CAD On SQ Lovenox 1mg/kg dose started for the LV clot Likely Eliquis or coumadin at discharge, NOAC preferred by Dr Surendra Mix post cath Cards planning for RCA stent on Wednesday AM 
D/C when cleared by cards D/W case management, will need assistance getting meds at discharge 
  
DM type 2 POA HgBa1c 6.1 Currently diet controlled Not currently on home medications Sliding scale with insulin BS 96, 97 
      84 
  
Hypokalemia K 3.2 improved Serial labs ? Left leg cellulitis vs venous stasis changes POA History of diabetic ulcers, s/p right 3rd toe amputation No significant PVD based on my exam and PVRs unless distal disease Distal pulses 2+ in both legs on my exam, DARIO 1.14and 1.25 Vascular surgery consultation saw, appreciate Dr Vanessa Tolliver input Continue wound care IV ceftriaxone, change to PO keflex to complete 7 days Needs podiatry follow up as outpatient, discussed with patient 
  
Tobacco use 1 PPD x 20 years CXR with hyperinflation Quit 1 month ago, stressed importance of quitting with patient 
  
Code Status: Full 
  
Surrogate Decision Maker:Yasemin Keene 
  
DVT Prophylaxis: Lovenox  
GI Prophylaxis: not indicated Recommended Disposition: Home w/Family [facility] versus [facility] visit likely Subjective: Chief Complaint / Reason for Physician Visit: F/u CHF(new), CAD \"I am fine, lot better now\". Discussed with RN events overnight. Review of Systems: 
Symptom Y/N Comments  Symptom Y/N Comments Fever/Chills n   Chest Pain n   
Poor Appetite    Edema y improved Cough n   Abdominal Pain Sputum n   Joint Pain SOB/DANIELSON y improved  Pruritis/Rash Nausea/vomit    Tolerating PT/OT y Diarrhea    Tolerating Diet y Constipation    Other Could NOT obtain due to:   
 
Objective: VITALS:  
Last 24hrs VS reviewed since prior progress note. Most recent are: 
Patient Vitals for the past 24 hrs: 
 Temp Pulse Resp BP SpO2  
12/10/19 0718 98.3 °F (36.8 °C) 73 18 129/86 98 % 12/10/19 0300 98.6 °F (37 °C) 79 20 115/80 100 % 12/09/19 2300 98.2 °F (36.8 °C) 68 30 109/78 97 % 12/09/19 1900 98.3 °F (36.8 °C) 95 20 118/84 96 % 12/09/19 1520 97.7 °F (36.5 °C) 89 20 121/74 98 % 12/09/19 1121 97.5 °F (36.4 °C) 78 20 129/90 96 % Intake/Output Summary (Last 24 hours) at 12/10/2019 1107 Last data filed at 12/10/2019 9258 Gross per 24 hour Intake  Output 1200 ml Net -1200 ml PHYSICAL EXAM: 
General: WD, WN. Alert, cooperative, no acute distress   
EENT:  EOMI. Anicteric sclerae. MMM Resp:  CTA bilaterally, no wheezing or rales. No accessory muscle use CV:  Regular  rhythm, minimal LE edema noted + GI:  Soft, Non distended, Non tender.  +Bowel sounds Neurologic:  Alert and oriented X 3, normal speech,  
 Psych:   Good insight. Not anxious nor agitated Skin:  No rashes. No jaundice, 1-2 cm DFU on the planter surface of R foot Reviewed most current lab test results and cultures  YES Reviewed most current radiology test results   YES Review and summation of old records today    NO Reviewed patient's current orders and MAR    YES 
PMH/SH reviewed - no change compared to H&P 
________________________________________________________________________ Care Plan discussed with: 
  Comments Patient x Family RN x Care Manager Consultant Multidiciplinary team rounds were held today with , nursing, pharmacist and clinical coordinator. Patient's plan of care was discussed; medications were reviewed and discharge planning was addressed. ________________________________________________________________________ Total NON critical care TIME:  26   Minutes Total CRITICAL CARE TIME Spent:   Minutes non procedure based Comments >50% of visit spent in counseling and coordination of care    
________________________________________________________________________ Maria Elena Maxwell MD  
 
Procedures: see electronic medical records for all procedures/Xrays and details which were not copied into this note but were reviewed prior to creation of Plan. LABS: 
I reviewed today's most current labs and imaging studies. Pertinent labs include: 
Recent Labs 12/10/19 
0316 WBC 8.0 HGB 11.1*  
HCT 35.8*  
 Recent Labs 12/10/19 
0316 12/08/19 
8592  140  
K 3.6 4.2  108 CO2 20* 25  
GLU 89 90 BUN 21* 24* CREA 0.98 1.33* CA 8.9 8.1* Signed: Maria Elena Maxwell MD

## 2019-12-10 NOTE — PROGRESS NOTES
12/10/2019 7:10 AM 
 
Admit Date: 12/3/2019 Admit Diagnosis: CHF (congestive heart failure) ([facility].) [I50.9] Subjective:  
 
Tessy Roger   denies chest pain, chest pressure/discomfort, dyspnea, palpitations, irregular heart beats, near-syncope, syncope, fatigue, orthopnea, paroxysmal nocturnal dyspnea, exertional chest pressure/discomfort, claudication, lower extremity edema. Visit Vitals /80 (BP 1 Location: Right arm, BP Patient Position: Sitting) Pulse 79 Temp 98.6 °F (37 °C) Resp 20 Ht 6' 1\" (1.854 m) Wt 183 lb 10.3 oz (83.3 kg) SpO2 100% BMI 24.23 kg/m² Current Facility-Administered Medications Medication Dose Route Frequency  ticagrelor (BRILINTA) tablet 90 mg  90 mg Oral Q12H  cephALEXin (KEFLEX) capsule 500 mg  500 mg Oral QID  sodium chloride (NS) flush 5-40 mL  5-40 mL IntraVENous Q8H  
 sodium chloride (NS) flush 5-40 mL  5-40 mL IntraVENous PRN  
 enoxaparin (LOVENOX) injection 90 mg  1 mg/kg SubCUTAneous Q12H  potassium chloride SR (KLOR-CON 10) tablet 10 mEq  10 mEq Oral DAILY  furosemide (LASIX) tablet 40 mg  40 mg Oral BID  aspirin delayed-release tablet 81 mg  81 mg Oral DAILY  atorvastatin (LIPITOR) tablet 40 mg  40 mg Oral QHS  carvedilol (COREG) tablet 12.5 mg  12.5 mg Oral BID  losartan (COZAAR) tablet 25 mg  25 mg Oral DAILY  nitroglycerin (NITROBID) 2 % ointment 1 Inch  1 Inch Topical Q6H PRN  
 acetaminophen (TYLENOL) tablet 650 mg  650 mg Oral Q6H PRN  
 ondansetron (ZOFRAN) injection 4 mg  4 mg IntraVENous Q6H PRN  
 bisacodyl (DULCOLAX) tablet 5 mg  5 mg Oral DAILY PRN  
 insulin lispro (HUMALOG) injection   SubCUTAneous AC&HS  
 glucose chewable tablet 16 g  4 Tab Oral PRN  
 dextrose (D50W) injection syrg 12.5-25 g  12.5-25 g IntraVENous PRN  
 glucagon (GLUCAGEN) injection 1 mg  1 mg IntraMUSCular PRN  
 albuterol-ipratropium (DUO-NEB) 2.5 MG-0.5 MG/3 ML  3 mL Nebulization Q4H PRN  
  neomycin-bacitracin-polymyxin (NEOSPORIN) ointment   Topical DAILY  influenza vaccine 2019-20 (6 mos+)(PF) (FLUARIX/FLULAVAL/FLUZONE QUAD) injection 0.5 mL  0.5 mL IntraMUSCular PRIOR TO DISCHARGE Objective:  
  
Visit Vitals /80 (BP 1 Location: Right arm, BP Patient Position: Sitting) Pulse 79 Temp 98.6 °F (37 °C) Resp 20 Ht 6' 1\" (1.854 m) Wt 183 lb 10.3 oz (83.3 kg) SpO2 100% BMI 24.23 kg/m² Physical Exam: Abdomen: soft, non-tender. Bowel sounds normal.  
Extremities: no cyanosis or edema Heart: regular rate and rhythm, S1, S2 normal, no murmur, click, rub or gallop Lungs: clear to auscultation bilaterally Neurologic: Grossly normal 
 
Data Review:  
Labs:   
Recent Results (from the past 24 hour(s)) GLUCOSE, POC Collection Time: 12/09/19  7:38 AM  
Result Value Ref Range Glucose (POC) 84 65 - 100 mg/dL Performed by Emmy Houston) GLUCOSE, POC Collection Time: 12/09/19 11:19 AM  
Result Value Ref Range Glucose (POC) 180 (H) 65 - 100 mg/dL Performed by Emmy Houston) GLUCOSE, POC Collection Time: 12/09/19  3:18 PM  
Result Value Ref Range Glucose (POC) 91 65 - 100 mg/dL Performed by Emmy Houston) GLUCOSE, POC Collection Time: 12/09/19  6:02 PM  
Result Value Ref Range Glucose (POC) 116 (H) 65 - 100 mg/dL Performed by Negrita Caceres GLUCOSE, POC Collection Time: 12/09/19  9:57 PM  
Result Value Ref Range Glucose (POC) 137 (H) 65 - 100 mg/dL Performed by [facility] LAWRENCE[place]G YEISON(RN)   
CBC WITH AUTOMATED DIFF Collection Time: 12/10/19  3:16 AM  
Result Value Ref Range WBC 8.0 4.1 - 11.1 K/uL  
 RBC 4.77 4.10 - 5.70 M/uL  
 HGB 11.1 (L) 12.1 - 17.0 g/dL HCT 35.8 (L) 36.6 - 50.3 % MCV 75.1 (L) 80.0 - 99.0 FL  
 MCH 23.3 (L) 26.0 - 34.0 PG  
 MCHC 31.0 30.0 - 36.5 g/dL  
 RDW 17.8 (H) 11.5 - 14.5 % PLATELET 771 289 - 621 K/uL  MPV 12.4 8.9 - 12.9 FL  
 NRBC 0.0 0  WBC ABSOLUTE NRBC 0.00 0.00 - 0.01 K/uL NEUTROPHILS 83 (H) 32 - 75 % LYMPHOCYTES 10 (L) 12 - 49 % MONOCYTES 4 (L) 5 - 13 % EOSINOPHILS 2 0 - 7 % BASOPHILS 0 0 - 1 % IMMATURE GRANULOCYTES 0 0.0 - 0.5 % ABS. NEUTROPHILS 6.6 1.8 - 8.0 K/UL  
 ABS. LYMPHOCYTES 0.8 0.8 - 3.5 K/UL  
 ABS. MONOCYTES 0.3 0.0 - 1.0 K/UL  
 ABS. EOSINOPHILS 0.2 0.0 - 0.4 K/UL  
 ABS. BASOPHILS 0.0 0.0 - 0.1 K/UL  
 ABS. IMM. GRANS. 0.0 0.00 - 0.04 K/UL  
 DF AUTOMATED METABOLIC PANEL, BASIC Collection Time: 12/10/19  3:16 AM  
Result Value Ref Range Sodium 138 136 - 145 mmol/L Potassium 3.6 3.5 - 5.1 mmol/L Chloride 107 97 - 108 mmol/L  
 CO2 20 (L) 21 - 32 mmol/L Anion gap 11 5 - 15 mmol/L Glucose 89 65 - 100 mg/dL BUN 21 (H) 6 - 20 MG/DL Creatinine 0.98 0.70 - 1.30 MG/DL  
 BUN/Creatinine ratio 21 (H) 12 - 20 GFR est AA >60 >60 ml/min/1.73m2 GFR est non-AA >60 >60 ml/min/1.73m2 Calcium 8.9 8.5 - 10.1 MG/DL Telemetry: normal sinus rhythm Assessment:  
 
Active Problems: 
  Biventricular failure ([address] (12/3/2019) Hypertension (12/4/2019) Plan:  
 
CAD, ICM, NYHA class 1: For RCA PCI tomorrow. I discussed the risks/benefits/alternatives of the procedure with the patient. Risks include (but are not limited to) bleeding, infection, cva/mi/tamponade/death. The patient understands and agrees to proceed. Will need lifevest.  
LV apical thrombus: on lovenox. Will start [address] after PCI tomorrow. BP controlled.  
DARIO noted. No PVD. Wound care.

## 2019-12-11 VITALS
DIASTOLIC BLOOD PRESSURE: 83 MMHG | TEMPERATURE: 97.5 F | BODY MASS INDEX: 24.08 KG/M2 | HEART RATE: 81 BPM | WEIGHT: 181.66 LBS | SYSTOLIC BLOOD PRESSURE: 119 MMHG | OXYGEN SATURATION: 96 % | HEIGHT: 73 IN | RESPIRATION RATE: 19 BRPM

## 2019-12-11 LAB
GLUCOSE BLD STRIP.AUTO-MCNC: 135 MG/DL (ref 65–100)
GLUCOSE BLD STRIP.AUTO-MCNC: 89 MG/DL (ref 65–100)
SERVICE CMNT-IMP: ABNORMAL
SERVICE CMNT-IMP: NORMAL

## 2019-12-11 PROCEDURE — C1874 STENT, COATED/COV W/DEL SYS: HCPCS | Performed by: INTERNAL MEDICINE

## 2019-12-11 PROCEDURE — 77030019569 HC BND COMPR RAD TERU -B: Performed by: INTERNAL MEDICINE

## 2019-12-11 PROCEDURE — 82962 GLUCOSE BLOOD TEST: CPT

## 2019-12-11 PROCEDURE — C1894 INTRO/SHEATH, NON-LASER: HCPCS | Performed by: INTERNAL MEDICINE

## 2019-12-11 PROCEDURE — 99152 MOD SED SAME PHYS/QHP 5/>YRS: CPT | Performed by: INTERNAL MEDICINE

## 2019-12-11 PROCEDURE — 74011000258 HC RX REV CODE- 258: Performed by: INTERNAL MEDICINE

## 2019-12-11 PROCEDURE — 74011250637 HC RX REV CODE- 250/637: Performed by: NURSE PRACTITIONER

## 2019-12-11 PROCEDURE — 93454 CORONARY ARTERY ANGIO S&I: CPT | Performed by: INTERNAL MEDICINE

## 2019-12-11 PROCEDURE — C1769 GUIDE WIRE: HCPCS | Performed by: INTERNAL MEDICINE

## 2019-12-11 PROCEDURE — 74011250636 HC RX REV CODE- 250/636: Performed by: INTERNAL MEDICINE

## 2019-12-11 PROCEDURE — 74011250637 HC RX REV CODE- 250/637: Performed by: INTERNAL MEDICINE

## 2019-12-11 PROCEDURE — 74011000250 HC RX REV CODE- 250: Performed by: INTERNAL MEDICINE

## 2019-12-11 PROCEDURE — 027034Z DILATION OF CORONARY ARTERY, ONE ARTERY WITH DRUG-ELUTING INTRALUMINAL DEVICE, PERCUTANEOUS APPROACH: ICD-10-PCS | Performed by: INTERNAL MEDICINE

## 2019-12-11 PROCEDURE — 77030010221 HC SPLNT WR POS TELE -B: Performed by: INTERNAL MEDICINE

## 2019-12-11 PROCEDURE — 77030019697 HC SYR ANGI INFL MRTM -B: Performed by: INTERNAL MEDICINE

## 2019-12-11 PROCEDURE — C1725 CATH, TRANSLUMIN NON-LASER: HCPCS | Performed by: INTERNAL MEDICINE

## 2019-12-11 PROCEDURE — B2111ZZ FLUOROSCOPY OF MULTIPLE CORONARY ARTERIES USING LOW OSMOLAR CONTRAST: ICD-10-PCS | Performed by: INTERNAL MEDICINE

## 2019-12-11 PROCEDURE — 99153 MOD SED SAME PHYS/QHP EA: CPT | Performed by: INTERNAL MEDICINE

## 2019-12-11 PROCEDURE — 77030019698 HC SYR ANGI MDLON MRTM -A: Performed by: INTERNAL MEDICINE

## 2019-12-11 PROCEDURE — C1887 CATHETER, GUIDING: HCPCS | Performed by: INTERNAL MEDICINE

## 2019-12-11 PROCEDURE — 74011636320 HC RX REV CODE- 636/320: Performed by: INTERNAL MEDICINE

## 2019-12-11 PROCEDURE — 90686 IIV4 VACC NO PRSV 0.5 ML IM: CPT | Performed by: INTERNAL MEDICINE

## 2019-12-11 PROCEDURE — 77030018729 HC ELECTRD DEFIB PAD CARD -B: Performed by: INTERNAL MEDICINE

## 2019-12-11 PROCEDURE — 90471 IMMUNIZATION ADMIN: CPT

## 2019-12-11 PROCEDURE — 92928 PRQ TCAT PLMT NTRAC ST 1 LES: CPT | Performed by: INTERNAL MEDICINE

## 2019-12-11 DEVICE — XIENCE SIERRA™ EVEROLIMUS ELUTING CORONARY STENT SYSTEM 3.00 MM X 23 MM / RAPID-EXCHANGE
Type: IMPLANTABLE DEVICE | Status: FUNCTIONAL
Brand: XIENCE SIERRA™

## 2019-12-11 DEVICE — XIENCE SIERRA™ EVEROLIMUS ELUTING CORONARY STENT SYSTEM 3.00 MM X 15 MM / RAPID-EXCHANGE
Type: IMPLANTABLE DEVICE | Status: FUNCTIONAL
Brand: XIENCE SIERRA™

## 2019-12-11 RX ORDER — LOSARTAN POTASSIUM 25 MG/1
25 TABLET ORAL DAILY
Qty: 30 TAB | Refills: 11 | Status: SHIPPED | OUTPATIENT
Start: 2019-12-12 | End: 2019-12-11

## 2019-12-11 RX ORDER — FENTANYL CITRATE 50 UG/ML
INJECTION, SOLUTION INTRAMUSCULAR; INTRAVENOUS AS NEEDED
Status: DISCONTINUED | OUTPATIENT
Start: 2019-12-11 | End: 2019-12-11

## 2019-12-11 RX ORDER — HEPARIN SODIUM 200 [USP'U]/100ML
INJECTION, SOLUTION INTRAVENOUS
Status: DISCONTINUED | OUTPATIENT
Start: 2019-12-11 | End: 2019-12-11

## 2019-12-11 RX ORDER — HEPARIN SODIUM 1000 [USP'U]/ML
INJECTION, SOLUTION INTRAVENOUS; SUBCUTANEOUS AS NEEDED
Status: DISCONTINUED | OUTPATIENT
Start: 2019-12-11 | End: 2019-12-11

## 2019-12-11 RX ORDER — MIDAZOLAM HYDROCHLORIDE 1 MG/ML
INJECTION, SOLUTION INTRAMUSCULAR; INTRAVENOUS AS NEEDED
Status: DISCONTINUED | OUTPATIENT
Start: 2019-12-11 | End: 2019-12-11

## 2019-12-11 RX ORDER — ASPIRIN 81 MG/1
81 TABLET ORAL DAILY
Qty: 30 TAB | Refills: 0 | Status: SHIPPED | OUTPATIENT
Start: 2019-12-12 | End: 2019-12-11

## 2019-12-11 RX ORDER — CARVEDILOL 12.5 MG/1
12.5 TABLET ORAL 2 TIMES DAILY
Qty: 60 TAB | Refills: 11 | Status: SHIPPED | OUTPATIENT
Start: 2019-12-11 | End: 2020-01-01 | Stop reason: SDUPTHER

## 2019-12-11 RX ORDER — ATORVASTATIN CALCIUM 40 MG/1
40 TABLET, FILM COATED ORAL
Qty: 30 TAB | Refills: 11 | Status: SHIPPED | OUTPATIENT
Start: 2019-12-11 | End: 2020-01-01 | Stop reason: SDUPTHER

## 2019-12-11 RX ORDER — ASPIRIN 81 MG/1
81 TABLET ORAL DAILY
Qty: 30 TAB | Refills: 0 | Status: SHIPPED | OUTPATIENT
Start: 2019-12-12 | End: 2020-01-11

## 2019-12-11 RX ORDER — LOSARTAN POTASSIUM 25 MG/1
25 TABLET ORAL DAILY
Qty: 30 TAB | Refills: 11 | Status: SHIPPED | OUTPATIENT
Start: 2019-12-12 | End: 2020-01-01

## 2019-12-11 RX ORDER — LIDOCAINE HYDROCHLORIDE 10 MG/ML
INJECTION, SOLUTION EPIDURAL; INFILTRATION; INTRACAUDAL; PERINEURAL AS NEEDED
Status: DISCONTINUED | OUTPATIENT
Start: 2019-12-11 | End: 2019-12-11

## 2019-12-11 RX ORDER — CARVEDILOL 12.5 MG/1
12.5 TABLET ORAL 2 TIMES DAILY
Qty: 60 TAB | Refills: 11 | Status: SHIPPED | OUTPATIENT
Start: 2019-12-11 | End: 2019-12-11

## 2019-12-11 RX ORDER — FUROSEMIDE 40 MG/1
40 TABLET ORAL DAILY
Qty: 30 TAB | Refills: 11 | Status: SHIPPED | OUTPATIENT
Start: 2019-12-11 | End: 2019-12-11

## 2019-12-11 RX ORDER — ATORVASTATIN CALCIUM 40 MG/1
40 TABLET, FILM COATED ORAL
Qty: 30 TAB | Refills: 11 | Status: SHIPPED | OUTPATIENT
Start: 2019-12-11 | End: 2019-12-11

## 2019-12-11 RX ORDER — VERAPAMIL HYDROCHLORIDE 2.5 MG/ML
INJECTION, SOLUTION INTRAVENOUS AS NEEDED
Status: DISCONTINUED | OUTPATIENT
Start: 2019-12-11 | End: 2019-12-11

## 2019-12-11 RX ORDER — FUROSEMIDE 40 MG/1
40 TABLET ORAL DAILY
Qty: 30 TAB | Refills: 11 | Status: SHIPPED | OUTPATIENT
Start: 2019-12-11 | End: 2020-01-01 | Stop reason: SDUPTHER

## 2019-12-11 RX ADMIN — POLYMYXIN B SULFATE, BACITRACIN ZINC, NEOMYCIN SULFATE: 5000; 3.5; 4 OINTMENT TOPICAL at 08:34

## 2019-12-11 RX ADMIN — CARVEDILOL 12.5 MG: 12.5 TABLET, FILM COATED ORAL at 08:32

## 2019-12-11 RX ADMIN — TICAGRELOR 90 MG: 90 TABLET ORAL at 08:32

## 2019-12-11 RX ADMIN — FUROSEMIDE 40 MG: 40 TABLET ORAL at 11:15

## 2019-12-11 RX ADMIN — Medication 10 ML: at 06:19

## 2019-12-11 RX ADMIN — INFLUENZA VIRUS VACCINE 0.5 ML: 15; 15; 15; 15 SUSPENSION INTRAMUSCULAR at 16:33

## 2019-12-11 RX ADMIN — POTASSIUM CHLORIDE 10 MEQ: 750 TABLET, FILM COATED, EXTENDED RELEASE ORAL at 11:15

## 2019-12-11 RX ADMIN — LOSARTAN POTASSIUM 25 MG: 25 TABLET, FILM COATED ORAL at 08:32

## 2019-12-11 RX ADMIN — APIXABAN 5 MG: 5 TABLET, FILM COATED ORAL at 11:15

## 2019-12-11 RX ADMIN — ASPIRIN 81 MG: 81 TABLET, COATED ORAL at 08:32

## 2019-12-11 NOTE — PROGRESS NOTES
Order sent for Lifevest to help prevent sudden cardiac death due to patient's reduced EF. Patient does not currently have insurance. Mirian Ferrari apparently has a new assistance program and will be reaching out to patient. Otherwise, once patient's Medicaid is approved, we can resend order for Lifevest as an outpatient. Patient does not need to stay for Lifevest fitting. If approved, he can be fit at home.  
 
 
 
Gonzalo Nair NP 
DNP, RN, AGACNP-BC

## 2019-12-11 NOTE — PROGRESS NOTES
12/11/2019 10:07 AM 
 
Admit Date: 12/3/2019 Admit Diagnosis: CHF (congestive heart failure) ([facility] 75.) [I50.9] Subjective:  
 
Carissa Holm   denies chest pain, chest pressure/discomfort, dyspnea, palpitations, irregular heart beats, near-syncope, syncope, fatigue, orthopnea, paroxysmal nocturnal dyspnea, exertional chest pressure/discomfort. Visit Vitals BP (!) 141/99 (BP 1 Location: Right arm, BP Patient Position: At rest) Pulse 76 Temp 97.5 °F (36.4 °C) Resp 20 Ht 6' 1\" (1.854 m) Wt 181 lb 10.5 oz (82.4 kg) SpO2 100% BMI 23.97 kg/m² Current Facility-Administered Medications Medication Dose Route Frequency  apixaban (ELIQUIS) tablet 5 mg  5 mg Oral Q12H  
 ticagrelor (BRILINTA) tablet 90 mg  90 mg Oral Q12H  
 sodium chloride (NS) flush 5-40 mL  5-40 mL IntraVENous Q8H  
 sodium chloride (NS) flush 5-40 mL  5-40 mL IntraVENous PRN  potassium chloride SR (KLOR-CON 10) tablet 10 mEq  10 mEq Oral DAILY  furosemide (LASIX) tablet 40 mg  40 mg Oral BID  aspirin delayed-release tablet 81 mg  81 mg Oral DAILY  atorvastatin (LIPITOR) tablet 40 mg  40 mg Oral QHS  carvedilol (COREG) tablet 12.5 mg  12.5 mg Oral BID  losartan (COZAAR) tablet 25 mg  25 mg Oral DAILY  nitroglycerin (NITROBID) 2 % ointment 1 Inch  1 Inch Topical Q6H PRN  
 acetaminophen (TYLENOL) tablet 650 mg  650 mg Oral Q6H PRN  
 ondansetron (ZOFRAN) injection 4 mg  4 mg IntraVENous Q6H PRN  
 bisacodyl (DULCOLAX) tablet 5 mg  5 mg Oral DAILY PRN  
 insulin lispro (HUMALOG) injection   SubCUTAneous AC&HS  
 glucose chewable tablet 16 g  4 Tab Oral PRN  
 dextrose (D50W) injection syrg 12.5-25 g  12.5-25 g IntraVENous PRN  
 glucagon (GLUCAGEN) injection 1 mg  1 mg IntraMUSCular PRN  
 albuterol-ipratropium (DUO-NEB) 2.5 MG-0.5 MG/3 ML  3 mL Nebulization Q4H PRN  
 neomycin-bacitracin-polymyxin (NEOSPORIN) ointment   Topical DAILY  influenza vaccine 2019-20 (6 mos+)(PF) (FLUARIX/FLULAVAL/FLUZONE QUAD) injection 0.5 mL  0.5 mL IntraMUSCular PRIOR TO DISCHARGE Objective:  
  
Visit Vitals BP (!) 141/99 (BP 1 Location: Right arm, BP Patient Position: At rest) Pulse 76 Temp 97.5 °F (36.4 °C) Resp 20 Ht 6' 1\" (1.854 m) Wt 181 lb 10.5 oz (82.4 kg) SpO2 100% BMI 23.97 kg/m² Physical Exam: Abdomen: soft, non-tender. Bowel sounds normal.  
Extremities: no cyanosis or edema Heart: regular rate and rhythm, S1, S2 normal, no murmur, click, rub or gallop Lungs: clear to auscultation bilaterally Neurologic: Grossly normal 
 
Data Review:  
Labs:   
Recent Results (from the past 24 hour(s)) GLUCOSE, POC Collection Time: 12/10/19 10:56 AM  
Result Value Ref Range Glucose (POC) 144 (H) 65 - 100 mg/dL Performed by Pia Rice GLUCOSE, POC Collection Time: 12/10/19  3:09 PM  
Result Value Ref Range Glucose (POC) 125 (H) 65 - 100 mg/dL Performed by Pia Rice GLUCOSE, POC Collection Time: 12/10/19  9:59 PM  
Result Value Ref Range Glucose (POC) 135 (H) 65 - 100 mg/dL Performed by [facility] PHIL LATHAM(RN)   
GLUCOSE, POC Collection Time: 12/11/19  8:02 AM  
Result Value Ref Range Glucose (POC) 89 65 - 100 mg/dL Performed by Ab Walker Telemetry: normal sinus rhythm Assessment:  
 
Active Problems: 
  Biventricular failure ([address] (12/3/2019) Hypertension (12/4/2019) Plan:  
 
CAD, ICM, NYHA class 1: s/p RCA AJAY. Observe later today and if stable can be dc later today. Will f/u as out pt. Will need lifevest.  
LV apical thrombus: Start DOAC today. BP controlled.  
DARIO noted. No PVD. Wound care.  
Will f/u as out pt.

## 2019-12-11 NOTE — PROGRESS NOTES
Problem: Patient Education: Go to Patient Education Activity Goal: Patient/Family Education Outcome: Progressing Towards Goal 
  
Problem: Pressure Injury - Risk of 
Goal: *Prevention of pressure injury Description Document Shukri Scale and appropriate interventions in the flowsheet. Outcome: Progressing Towards Goal 
Note: Pressure Injury Interventions: 
Sensory Interventions: Assess changes in LOC Moisture Interventions: Absorbent underpads Activity Interventions: Increase time out of bed Mobility Interventions: PT/OT evaluation Nutrition Interventions: Offer support with meals,snacks and hydration Friction and Shear Interventions: Lift sheet Problem: Patient Education: Go to Patient Education Activity Goal: Patient/Family Education Outcome: Progressing Towards Goal

## 2019-12-11 NOTE — PROGRESS NOTES
JAMI: Discharge Home with follow up appointments and LifeVest 
 
Met with patient to discuss discharge plan. Pt to return home with LifeVest and follow up appointments. Pt to discharge with both Eliquis and Brilinta. Provided patient with medication assistance cards for both of these medications. Provided patient a pamphlet regarding the LifeVest. 
 
Contact made with Manish Herrera ([phone]) with [facility] and spoke with Avel Dias with [facility] at patient's room about patient's qualification for a life vest.  Jamey worked with patient to complete a financial assistance application and will have that processed by tomorrow and will deliver to patient at the home. Pt's sister present in patient room and this care manager discussed affordability of other medications. She reports that she will assist patient with paying for medications until patient's Medicaid is approved. She will need hard scripts of patient's medications to take to a pharmacy close to the hospital, as the pharmacy in [place] will be closed before they can get there. No other patient's identified from CM at this time. Pt is ready from a  standpoint. Mitzie Dance [facility] Care Manager - [id number] [facility] [employer] ACP Facilitator Zone Phone: [phone] Mobile: [phone]

## 2019-12-11 NOTE — PROGRESS NOTES
Patient will be unable to get to his home pharmacy before it closes, so he asked that I send all prescriptions to [address] [place] [address] (since [facility] script already sent there). Will leave prescriptions at home pharmacy as well for refill (except Brilinta. Patient will need a new script sent there when he has insurance or when transitioned to Plavix).    
 
 
 
 
Glo Harper NP 
DNP, RN, AGAP-BC

## 2019-12-11 NOTE — PROGRESS NOTES
JAMI: Discharge to [facility] once LTC plan is determined Phone call from Hang Zepeda, admissions liaison at [facility] and [facility] ,reporting that patient has received [place], however, it is necessary to know patient's LTC plan once he is ready for discharge from [facility]. Patient was to discharge back to his adult group home initially, but it was determined that patient did not want to return and the adult group home is hesitant about having patient return as well. This CM just obtained information regarding discharge planning and LTC. Pt was a direct placement through [facility] and will need to get information on LTC options for patient. He currently only has [facility]. [employer] will be contacted to screen patient for Medicaid for LTC services. Nadiya Carbajal Care Manager - ED [facility] ACP Facilitator Zone Phone: [phone] Mobile: [phone]

## 2019-12-11 NOTE — PROGRESS NOTES
Hospitalist Progress Note NAME: Fahad Parker :  1980 MRN:  [id number] Assessment / Plan: 
Acute systolic CHF POA LVEF 16 to 20%(new diagnosis) CAD 3 vessel POA only RCA territory viable on cardiac MRI Essential HTN uncontrolled POA Left ventricular thrombus POA started on lovenox 1 mg/kg COPD POA Admitted with severe [address] CXR read as hyperinflated lungs, no ASD or edema, no wheezing May have some element of COPD, but DANIELSON mainly the heart Echocardiogram severe systolic dysfunction LVEF 16 - 20%. Reduced RV function as well Edema and dyspnea significantly improved with diuresis, now on PO 
BP improved = continue coreg and losartan, watch for low BP Cardiac cath = normal LM, 90% mid LAD, Left circ severe disease, 100% OM1 Distal RCA 80% Cardiac MRI 2019 LVEF 20%. There are 2 mural thrombus seen in the LV apex LAD territory is largely nonviable. LCx territory demonstrate very minimal viability RCA territory demonstrate significant viability without any infarct. No smoking LDL 69, start statin with the CAD On SQ Lovenox 1mg/kg dose started for the LV clot Likely Eliquis or coumadin at discharge, NOAC preferred by Dr Marylu Morales post cath Cards planning for RCA stenting today AM- pt is NPO 
D/C when cleared by cards D/W case management, will need assistance getting meds at discharge- Plan for DC home with family once cleared by cardiology post cath today- anticipate in AM 
  
DM type 2 POA HgBa1c 6.1 Currently diet controlled Not currently on home medications Sliding scale with insulin BS 96, 97 
      84 
  
Hypokalemia K 3.2 improved Serial labs ? Left leg cellulitis vs venous stasis changes POA History of diabetic ulcers, s/p right 3rd toe amputation No significant PVD based on my exam and PVRs unless distal disease Distal pulses 2+ in both legs on my exam, DARIO 1.14and 1.25 
 Vascular surgery consultation saw, appreciate Dr Vanessa Tolliver input Continue wound care IV ceftriaxone, change to PO keflex to complete 7 days Needs podiatry follow up as outpatient, discussed with patient 
  
Tobacco use 1 PPD x 20 years CXR with hyperinflation Quit 1 month ago, stressed importance of quitting with patient 
  
Code Status: Full 
  
Surrogate Decision Maker:Yasemin Keene 
  
DVT Prophylaxis: Lovenox  
GI Prophylaxis: not indicated Recommended Disposition: Home w/Family +/- H2H visit likely- deferred to CM to see if pt qualifies Subjective: Chief Complaint / Reason for Physician Visit: F/u CHF(new), CAD \"I am fine, lot better now\". Discussed with RN events overnight. Review of Systems: 
Symptom Y/N Comments  Symptom Y/N Comments Fever/Chills n   Chest Pain n   
Poor Appetite    Edema y improved Cough n   Abdominal Pain Sputum n   Joint Pain SOB/DANIELSON y improved  Pruritis/Rash Nausea/vomit    Tolerating PT/OT y Diarrhea    Tolerating Diet y NPO for cath Constipation    Other Could NOT obtain due to:   
 
Objective: VITALS:  
Last 24hrs VS reviewed since prior progress note. Most recent are: 
Patient Vitals for the past 24 hrs: 
 Temp Pulse Resp BP SpO2  
12/11/19 0700 97.5 °F (36.4 °C) 76 20 (!) 141/99 100 % 12/11/19 0300 97.9 °F (36.6 °C) 79 18 (!) 139/99 99 % 12/10/19 2300 98.1 °F (36.7 °C) 76 20 123/87 97 % 12/10/19 1900 97.8 °F (36.6 °C) 78 18 132/85 100 % 12/10/19 1512  76     
12/10/19 1511 98.2 °F (36.8 °C) 76 18 118/87 98 % 12/10/19 1057 97.9 °F (36.6 °C) 73 18 107/84 98 % Intake/Output Summary (Last 24 hours) at 12/11/2019 4217 Last data filed at 12/10/2019 2300 Gross per 24 hour Intake  Output 900 ml Net -900 ml PHYSICAL EXAM: 
General: WD, WN. Alert, cooperative, no acute distress   
EENT:  EOMI. Anicteric sclerae. MMM Resp:  CTA bilaterally, no wheezing or rales. No accessory muscle use CV:  Regular  rhythm, minimal LE edema noted + GI:  Soft, Non distended, Non tender.  +Bowel sounds Neurologic:  Alert and oriented X 3, normal speech, Psych:   Good insight. Not anxious nor agitated Skin:  No rashes. No jaundice, 1-2 cm DFU on the planter surface of R foot Reviewed most current lab test results and cultures  YES Reviewed most current radiology test results   YES Review and summation of old records today    NO Reviewed patient's current orders and MAR    YES 
PMH/SH reviewed - no change compared to H&P 
________________________________________________________________________ Care Plan discussed with: 
  Comments Patient x Family RN x Care Manager x Consultant Multidiciplinary team rounds were held today with , nursing, pharmacist and clinical coordinator. Patient's plan of care was discussed; medications were reviewed and discharge planning was addressed. ________________________________________________________________________ Total NON critical care TIME:  26   Minutes Total CRITICAL CARE TIME Spent:   Minutes non procedure based Comments >50% of visit spent in counseling and coordination of care    
________________________________________________________________________ Laya Núñez MD  
 
Procedures: see electronic medical records for all procedures/Xrays and details which were not copied into this note but were reviewed prior to creation of Plan. LABS: 
I reviewed today's most current labs and imaging studies. Pertinent labs include: 
Recent Labs 12/10/19 
0316 WBC 8.0 HGB 11.1*  
HCT 35.8*  
 Recent Labs 12/10/19 
0316   
K 3.6  CO2 20* GLU 89 BUN 21* CREA 0.98  
CA 8.9 Signed: Laya Núñez MD

## 2019-12-11 NOTE — CARDIO/PULMONARY
Cardiac Rehab Note: chart review S/P PCI/stenting 12/11/2019 CAD education folder, with heart heathy diet, warning signs, heart facts, catheterization brochure, and out patient cardiac rehab program provided to Vincent Ron on 12/11/2019 Educated using teach back method. Reviewed CAD diagnosis definition and purpose of intervention. Discussed risk factors for CAD to include the following: family history,  hyperlipidemia, hypertension, diabetes, and stress. Reviewed the importance of medication compliance(Brilinta), follow up appointments with cardiologist, signs and symptoms of angina, and what to report to physician after discharge. Emphasized the value of cardiac rehab. Pt declined a second time for OP Cardiac Rehab due to distance. \"Living with Heart Failure\" book with daily weight calendar and s/s of heart failure magnet provided to Carlton Velasquez on 12/9/2019 Educated using teach back method. Instruction given on s/s of CHF, checking weight every am and calling MD if weight is up 2-3 lbs in a day or 5 lbs in a week (or as directed by the physician), fluid/Na restrictions, s/s of worsening CHF and when to call MD. Reviewed activity as tolerated with frequent rest periods as needed, taking medications as prescribed, and the importance of follow up visits with physician. To be fitted for Life vest per notes. Again reminded to exercise at home 3-5 x week for 30 min if able to help strengthen heart/or per Cardiologist recommendation Vincent Heads verbalized understanding with questions answered.   Brigitte Witt RN

## 2019-12-11 NOTE — DISCHARGE SUMMARY
Hospitalist Discharge Summary Patient ID: Amanda Garcia 
[id number] 
45 y.o. 
1980 
12/3/2019 PCP on record: None Admit date: 12/3/2019 Discharge date and time: 12/11/2019 DISCHARGE DIAGNOSIS: 
 
Acute systolic CHF POA LVEF 16 to 20%(new diagnosis)- cont lasix, coreg, cozaar CAD 3 vessel POA only RCA territory viable on cardiac MRI- s/p cardiac cath/PCI with RCA AJAY today- cont ASA, Brilinta Essential HTN uncontrolled POA Left ventricular thrombus POA - cont eliquis COPD POA 
DM type 2 POA HgBa1c 6.1 Smoker ? Left leg cellulitis vs venous stasis changes POA History of diabetic ulcers, s/p right 3rd toe amputation CONSULTATIONS: 
IP CONSULT TO CARDIOLOGY 
IP CONSULT TO VASCULAR SURGERY Excerpted HPI from H&P of Almaz Castro MD: 
\"43 years old female with past medical history significant for diabetes was transferred from [facility] ED for treatment of shortness of breath associated with worsening bilateral leg swelling, patient stated his shortness of breath and leg swelling started about couple weeks ago worsening denies any fever denies any chills, patient stated his shortness of breath worsened with minimal exertion denies any chest pain, blood work in ED was significant for elevated proBNP [id number]\" 
 
______________________________________________________________________ DISCHARGE SUMMARY/HOSPITAL COURSE:  for full details see H&P, daily progress notes, labs, consult notes. Acute systolic CHF POA LVEF 16 to 20%(new diagnosis) CAD 3 vessel POA only RCA territory viable on cardiac MRI Essential HTN uncontrolled POA Left ventricular thrombus POA started on lovenox 1 mg/kg COPD POA Admitted [facility], [address] CXR read as hyperinflated lungs, no ASD or edema, no wheezing 
       May have some element of COPD, but DANIELSON mainly the heart Echocardiogram severe systolic dysfunction LVEF 16 - 20%.         Reduced RV function as well Edema and dyspnea significantly improved with diuresis, now on PO 
BP improved = continue coreg and losartan, watch for low BP Cardiac cath = normal LM, 90% mid LAD, Left circ severe disease, 100% OM1 
          Distal RCA 80% Cardiac MRI 12/8/2019    
     LVEF 20%. There are 2 mural thrombus seen in the LV apex      LAD territory is largely nonviable.  
     LCx territory demonstrate very minimal viability      RCA territory demonstrate significant viability without any infarct. 
  
No smoking LDL 69, start statin with the CAD On SQ Lovenox 1mg/kg dose started for the LV clot Likely Eliquis or coumadin at discharge, NOAC preferred by Dr Giuseppe Chappell post cath Cards planning for RCA stenting today AM- pt is NPO 
D/C when cleared by cards D/W case management, will need assistance getting meds at discharge- Plan for DC home with family once cleared by cardiology post cath today- anticipate in AM 
  
DM type 2 POA HgBa1c 6.1 Currently diet controlled Not currently on home medications Sliding scale with insulin BS 96, 97 
      84 
  
Hypokalemia K 3.2 improved Serial labs ? Left leg cellulitis vs venous stasis changes POA History of diabetic ulcers, s/p right 3rd toe amputation No significant PVD based on my exam and PVRs unless distal disease Distal pulses 2+ in both legs on my exam, DARIO 1.14and 1.25 Vascular surgery consultation saw, appreciate Dr Jay Gamboa input Continue wound care IV ceftriaxone, change to PO keflex to complete 7 days Needs podiatry follow up as outpatient, discussed with patient 
  
Tobacco use 1 PPD x 20 years CXR with hyperinflation Quit 1 month ago, stressed importance of quitting with patient 
  
 
 
 
_______________________________________________________________________ Patient seen and examined by me on discharge day. Pertinent Findings: 
Gen:    Not in distress Chest: Clear lungs CVS:   Regular rhythm. No edema Abd:  Soft, not distended, not tender Neuro:  Alert, oriented x 3 
_______________________________________________________________________ DISCHARGE MEDICATIONS:  
Current Discharge Medication List  
  
START taking these medications Details  
apixaban (ELIQUIS) 5 mg tablet Take 1 Tab by mouth every twelve (12) hours. Qty: 60 Tab, Refills: 11  
  
aspirin delayed-release 81 mg tablet Take 1 Tab by mouth daily for 30 days. Qty: 30 Tab, Refills: 0  
  
atorvastatin (LIPITOR) 40 mg tablet Take 1 Tab by mouth nightly. Qty: 30 Tab, Refills: 11  
  
carvedilol (COREG) 12.5 mg tablet Take 1 Tab by mouth two (2) times a day. Qty: 60 Tab, Refills: 11  
  
furosemide (LASIX) 40 mg tablet Take 1 Tab by mouth daily. Qty: 30 Tab, Refills: 11  
  
losartan (COZAAR) 25 mg tablet Take 1 Tab by mouth daily. Qty: 30 Tab, Refills: 11  
  
ticagrelor (BRILINTA) 90 mg tablet Take 1 Tab by mouth every twelve (12) hours every twelve (12) hours. Qty: 60 Tab, Refills: 11 Patient Follow Up Instructions: Activity: Activity as tolerated Diet: Cardiac Diet Wound Care: Keep wound clean and dry Follow-up with Dr Rossy Ramirez ([address] [place] [address] cardiology)  in 2 weeks. Follow-up Information Follow up With Specialties Details Why Contact Info Noemi Reynolds MD Family Practice Go on 12/10/2019 For hospital follow up appointment at 2:00PM  [place] [id number] [phone] Jhonathan Devine MD Surgery   [address] [place] [address] [id number] [phone] Yung Martinez MD Cardiology On 12/24/2019 1:00pm Cardiology hospital follow up [address] [place] [address] 
[phone] None    None (395) Patient stated that they have no PCP 
  
  
 
________________________________________________________________ Risk of deterioration: Low 
 
Condition at Discharge:  Stable 
__________________________________________________________________ Disposition Home with family, no needs ____________________________________________________________________ Code Status: Full Code 
___________________________________________________________________ Total time in minutes spent coordinating this discharge (includes going over instructions, follow-up, prescriptions, and preparing report for sign off to her PCP) :  26 minutes Signed: 
Maria Elena Maxwell MD

## 2019-12-11 NOTE — DISCHARGE INSTRUCTIONS
HOSPITALIST DISCHARGE INSTRUCTIONS    NAME: Evan Heller   :  1980   MRN:  [id number]     Date/Time:  2019 3:49 PM    ADMIT DATE: 12/3/2019     DISCHARGE DATE: 2019     DISCHARGE DIAGNOSIS:  Acute systolic CHF POA LVEF 16 to 20%(new diagnosis)- cont lasix, coreg, cozaar  CAD 3 vessel POA only RCA territory viable on cardiac MRI- s/p cardiac cath/PCI with RCA AJAY today- cont ASA, Brilinta  Essential HTN uncontrolled POA  Left ventricular thrombus POA - cont eliquis  COPD POA  DM type 2 POA HgBa1c 6.1  Smoker  ? Left leg cellulitis vs venous stasis changes POA  History of diabetic ulcers, s/p right 3rd toe amputation  Active Problems:    Biventricular failure ([address] (12/3/2019)      Hypertension (2019)         MEDICATIONS:  As per medication reconciliation  list  · It is important that you take the medication exactly as they are prescribed. · Keep your medication in the bottles provided by the pharmacist and keep a list of the medication names, dosages, and times to be taken in your wallet. · Do not take other medications without consulting your doctor. · Brilinta needs to be taken for 1 year. Initial prescription (with discount card) sent to [place] [address]. When you have insurance, new refills can be sent to your home pharmacy. IF your insurance is not processed before 30 days, you need to discuss switching to Plavix with the cardiology office instead of Brilinta (due to high cost). You MUST take either Brilinta or Plavix for the next year. Pain Management: per above medications    What to do at Home    Recommended diet:  Cardiac Diet    Recommended activity: Activity as tolerated    If you have questions regarding the hospital related prescriptions or hospital related issues please call [facility] at [phone].     If you experience any of the following symptoms then please call your primary care physician or return to the emergency room if you cannot get hold of your doctor:  Fever, chills, nausea, vomiting, diarrhea, change in mentation, falling, bleeding, shortness of breath,     Follow Up:  Dr. Bettye Pollard you are to call and set up an appointment to see them in 7-10 days. Information obtained by :  I understand that if any problems occur once I am at home I am to contact my physician. I understand and acknowledge receipt of the instructions indicated above. Physician's or R.N.'s Signature                                                                  Date/Time                                                                                                                                              Patient or Representative Signature                                                          Date/Time            Radial Cardiac Catheterization/Angiography Discharge Instructions    It is normal to feel tired the first couple days. Take it easy and follow the physicians instructions. CHECK THE CATHETER INSERTION SITE DAILY:    Remove the wrist dressing 24 hours after the procedure. You may shower 24 hours after the procedure. Wash with soap and water and pat dry. Gentle cleaning of the site with soap and water is sufficient, cover with a dry clean dressing or bandage. Do not apply creams or powders to the area. No soaking the wrist for 3 days. Leave the puncture site open to air after 24 hours post-procedure. CALL THE PHYSICIANS:     If the site becomes red, swollen or feels warm to the touch  If there is bleeding or drainage or if there is unusual pain at the radial site. If there is any minor oozing, you may apply a band-aid and remove after 12 hours.    If the bleeding continues, hold pressure with the middle finger against the puncture site and the thumb against the back of the wrist,call 911 to be transported to the hospital.  DO NOT DRIVE YOURSELF, OR HAVE ANYONE ELSE DRIVE YOU - CALL [phone]. ACTIVITY:   For the first 24 hours do not manipulate the wrist.  No lifting, pushing or pulling over 3-5 pounds with the affected wrist for 7 daysand no straining the insertion site. Do not life grocery bags or the garbage can, do not run the vacuum  or  for 7 days. Start with short walks as in the hospital and gradually increase as tolerated each day. It is recommended to walk 30 minutes 5-7 days per week. Follow your physicians instructions on activity. Avoid walking outside in extremes of heat or cold. Walk inside when it is cold and windy or hot and humid. Things to keep in mind:  No driving for at least 24 hours, or as designated by your physician. Limit the number of times you go up and down the stairs  Take rests and pace yourself with activity. Be careful and do not strain with bowel movements. MEDICATIONS:    Take all medications as prescribed  Call your physician if you have any questions  Keep an updated list of your medications with you at all times and give a list to your physician and pharmacist    SIGNS AND SYMPTOMS:   Be cautious of symptoms of angina or recurrent symptoms such as chest discomfort, unusual shortness of breath or fatigue. These could be symptoms of restenosis, a new blockage or a heart attack. If your symptoms are relieved with rest it is still recommended that you notify your physician of recurrent chest pain or discomfort. For CHEST PAIN or symptoms of angina not relieved with rest:  If the discomfort is not relieved with rest, and you have been prescribed Nitroglycerin, take as directed (taken under the tongue, one at a time 5 minutes apart for a total of 3 doses). If the discomfort is not relieved after the 3rd nitroglycerin, call 911.   If you have not been prescribed Nitroglycerin  and your chest discomfort is not relieved with rest, call 911. AFTER CARE:   Follow up with your physician as instructed. Follow a heart healthy diet with proper portion control, daily stress management, daily exercise, blood pressure and cholesterol control , and smoking cessation.

## 2019-12-11 NOTE — PROGRESS NOTES
1900: Received report from day shift nurse Brigitte. Reviewed SBAR, Kardex, I/O, MAR, Procedural information and Recent results. VSS, NSR (rhythm), RA (oxygenation). A/O X 4 Pt resting in bed/sitting up in the bed. Pt reporting no CP/SOB. 2200: Evening meds given. , no coverage needed. 2300: VSS, NSR, RA, no complaints. Daily wound care completed. Dressing changed. Pt tolerated procedure well. 0000: Pt NPO. Pt ambulated from the bed to the bedside chair x standby assistance. 0100: CHG, prepped. Consents on the chart. 0300: VSS, NSR, RA, no complaints. Pt returned to bed.

## 2019-12-12 LAB
GLUCOSE BLD STRIP.AUTO-MCNC: 87 MG/DL (ref 65–100)
SERVICE CMNT-IMP: NORMAL

## 2019-12-24 ENCOUNTER — OFFICE VISIT (OUTPATIENT)
Dept: CARDIOLOGY CLINIC | Age: 39
End: 2019-12-24

## 2019-12-24 VITALS
RESPIRATION RATE: 16 BRPM | WEIGHT: 193.5 LBS | SYSTOLIC BLOOD PRESSURE: 108 MMHG | HEIGHT: 73 IN | OXYGEN SATURATION: 98 % | HEART RATE: 73 BPM | BODY MASS INDEX: 25.64 KG/M2 | DIASTOLIC BLOOD PRESSURE: 72 MMHG

## 2019-12-24 DIAGNOSIS — I10 ESSENTIAL HYPERTENSION: ICD-10-CM

## 2019-12-24 DIAGNOSIS — Z98.61 S/P PTCA (PERCUTANEOUS TRANSLUMINAL CORONARY ANGIOPLASTY): ICD-10-CM

## 2019-12-24 DIAGNOSIS — I25.10 CORONARY ARTERY DISEASE INVOLVING NATIVE CORONARY ARTERY OF NATIVE HEART WITHOUT ANGINA PECTORIS: ICD-10-CM

## 2019-12-24 DIAGNOSIS — I25.5 ISCHEMIC CARDIOMYOPATHY: ICD-10-CM

## 2019-12-24 DIAGNOSIS — I50.82 BIVENTRICULAR FAILURE (HCC): Primary | ICD-10-CM

## 2019-12-24 DIAGNOSIS — L27.0 DRUG RASH: ICD-10-CM

## 2019-12-24 DIAGNOSIS — Z98.890 S/P CARDIAC CATH: ICD-10-CM

## 2019-12-24 RX ORDER — HYDROXYZINE PAMOATE 25 MG/1
25 CAPSULE ORAL
Qty: 21 CAP | Refills: 0 | Status: SHIPPED | OUTPATIENT
Start: 2019-12-24 | End: 2020-01-07

## 2019-12-24 RX ORDER — CLOPIDOGREL BISULFATE 75 MG/1
75 TABLET ORAL DAILY
Qty: 30 TAB | Refills: 5 | Status: SHIPPED | OUTPATIENT
Start: 2019-12-24 | End: 2020-01-01 | Stop reason: SDUPTHER

## 2019-12-24 RX ORDER — METHYLPREDNISOLONE 4 MG/1
TABLET ORAL
Qty: 1 DOSE PACK | Refills: 0 | Status: ON HOLD | OUTPATIENT
Start: 2019-12-24 | End: 2021-01-01 | Stop reason: SDUPTHER

## 2019-12-24 NOTE — PROGRESS NOTES
[facility] @ [facility] Martin Stacy DNP, ANP-BC Subjective/HPI:  
 
Stephanie Muse is a 44 y.o. male is here for transitional care hospital follow-up admitted for acute systolic heart failure ejection fraction 20%, three-vessel disease cardiac MRI showing only viable RCA territory underwent PTCA stenting. Since hospital discharge he has developed a urticarial rash along his entire body. In chart review it appears Brilinta and Eliquis were of the last 2 medications added. He denies shortness of breath or chest pain. He has quit smoking. He denies any pain or tenderness from cardiac catheterization site. He is maintained on Eliquis for LV thrombus. Hospitalist note: 
Acute systolic CHF POA LVEF 16 to 20%(new diagnosis)- cont lasix, coreg, cozaar CAD 3 vessel POA only RCA territory viable on cardiac MRI- s/p cardiac cath/PCI with RCA AJAY today- cont ASA, Brilinta Essential HTN uncontrolled POA Left ventricular thrombus POA - cont eliquis COPD POA 
DM type 2 POA HgBa1c 6.1 Smoker ? Left leg cellulitis vs venous stasis changes POA History of diabetic ulcers, s/p right 3rd toe amputation 
  
  
CONSULTATIONS: 
IP CONSULT TO CARDIOLOGY 
IP CONSULT TO VASCULAR SURGERY 
  
Excerpted HPI from H&P of Navin Drake MD: 
\"43 years old female with past medical history significant for diabetes was transferred from [facility] ED for treatment of shortness of breath associated with worsening bilateral leg swelling, patient stated his shortness of breath and leg swelling started about couple weeks ago worsening denies any fever denies any chills, patient stated his shortness of breath worsened with minimal exertion denies any chest pain, blood work in ED was significant for elevated proBNP [id number]\" 
  
______________________________________________________________________ DISCHARGE SUMMARY/HOSPITAL COURSE:  for full details see H&P, daily progress notes, labs, consult notes.   
Acute systolic CHF POA LVEF 16 to 20%(new diagnosis) CAD 3 vessel POA only RCA territory viable on cardiac MRI Essential HTN uncontrolled POA Left ventricular thrombus POA started on lovenox 1 mg/kg COPD POA Ad[address] CXR read as hyperinflated lungs, no ASD or edema, no wheezing 
       May have some element of COPD, but DANIELSON mainly the heart Echocardiogram severe systolic dysfunction LVEF 16 - 20%.         Reduced RV function as well Edema and dyspnea significantly improved with diuresis, now on PO 
BP improved = continue coreg and losartan, watch for low BP Cardiac cath = normal LM, 90% mid LAD, Left circ severe disease, 100% OM1 
          Distal RCA 80% Cardiac MRI 12/8/2019    
     LVEF 20%. There are 2 mural thrombus seen in the LV apex      LAD territory is largely nonviable.  
     LCx territory demonstrate very minimal viability      RCA territory demonstrate significant viability without any infarct. 
  
No smoking LDL 69, start statin with the CAD On SQ Lovenox 1mg/kg dose started for the LV clot Likely Eliquis or coumadin at discharge, NOAC preferred by Dr Jamal Noble post cath Cards planning for RCA stenting today AM- pt is NPO 
D/C when cleared by cards D/W case management, will need assistance getting meds at discharge- Plan for DC home with family once cleared by cardiology post cath today- anticipate in AM 
  
DM type 2 POA HgBa1c 6.1 Currently diet controlled Not currently on home medications Sliding scale with insulin BS 96, 97 
      84 
  
Hypokalemia K 3.2 improved Serial labs ? Left leg cellulitis vs venous stasis changes POA History of diabetic ulcers, s/p right 3rd toe amputation No significant PVD based on my exam and PVRs unless distal disease Distal pulses 2+ in both legs on my exam, DARIO 1.14and 1.25 Vascular surgery consultation saw, appreciate Dr Sergei Mcadams input Continue wound care IV ceftriaxone, change to PO keflex to complete 7 days Needs podiatry follow up as outpatient, discussed with patient 
  
Tobacco use 1 PPD x 20 years CXR with hyperinflation Quit 1 month ago, stressed importance of quitting with patient PCP Provider None Past Medical History:  
Diagnosis Date  Diabetes ([address] Past Surgical History:  
Procedure Laterality Date  HX AMPUTATION  10/02/2012 Right 3rd toe No Known Allergies Family History Problem Relation Age of Onset  Heart Disease Maternal Grandmother  Cancer Paternal Grandmother   
     Dufm Cipro  Stroke Paternal Grandfather Current Outpatient Medications Medication Sig  
 hydrOXYzine pamoate (VISTARIL) 25 mg capsule Take 1 Cap by mouth three (3) times daily as needed for Itching for up to 14 days.  methylPREDNISolone (MEDROL DOSEPACK) 4 mg tablet As directed.  clopidogrel (PLAVIX) 75 mg tab Take 1 Tab by mouth daily. D/c brilinta ? Allergic drug rash  apixaban (ELIQUIS) 5 mg tablet Take 1 Tab by mouth every twelve (12) hours.  aspirin delayed-release 81 mg tablet Take 1 Tab by mouth daily for 30 days.  atorvastatin (LIPITOR) 40 mg tablet Take 1 Tab by mouth nightly.  carvedilol (COREG) 12.5 mg tablet Take 1 Tab by mouth two (2) times a day.  furosemide (LASIX) 40 mg tablet Take 1 Tab by mouth daily.  losartan (COZAAR) 25 mg tablet Take 1 Tab by mouth daily. No current facility-administered medications for this visit. Vitals:  
 12/24/19 1251 12/24/19 1304 BP: 112/78 108/72 Pulse: 73 Resp: 16 SpO2: 98% Weight: 193 lb 8 oz (87.8 kg) Height: 6' 1\" (1.854 m) Social History Socioeconomic History  Marital status: SINGLE Spouse name: Not on file  Number of children: Not on file  Years of education: Not on file  Highest education level: Not on file Occupational History  Not on file Social Needs  Financial resource strain: Not on file  Food insecurity:  
  Worry: Not on file Inability: Not on file  Transportation needs:  
  Medical: Not on file Non-medical: Not on file Tobacco Use  Smoking status: Former Smoker Types: Cigarettes Last attempt to quit: 2019 Years since quittin.0  Smokeless tobacco: Never Used Substance and Sexual Activity  Alcohol use: No  
 Drug use: Never  Sexual activity: Not on file Lifestyle  Physical activity:  
  Days per week: Not on file Minutes per session: Not on file  Stress: Not on file Relationships  Social connections:  
  Talks on phone: Not on file Gets together: Not on file Attends Christianity service: Not on file Active member of club or organization: Not on file Attends meetings of clubs or organizations: Not on file Relationship status: Not on file  Intimate partner violence:  
  Fear of current or ex partner: Not on file Emotionally abused: Not on file Physically abused: Not on file Forced sexual activity: Not on file Other Topics Concern  Not on file Social History Narrative  Not on file I have reviewed the nurses notes, vitals, problem list, allergy list, medical history, family, social history and medications. Review of Symptoms 11 systems reviewed, negative other than as stated in the HPI Physical Exam:   
 
General: Well developed, in no acute distress, cooperative and alert HEENT: No carotid bruits, no JVD, trach is midline. Neck Supple, PERRL, EOM intact. Heart:  Normal S1/S2 negative S3 or S4. Regular, no murmur, gallop or rub. Respiratory: Clear bilaterally x 4, no wheezing or rales Abdomen:   Soft, non-tender, no masses, bowel sounds are active. Extremities:  No edema, normal cap refill, no cyanosis, atraumatic. Neuro: A&Ox3, speech clear, gait stable. Skin: Diffuse urticarial rash with excoriation bilateral arms chest back and face. There is no purpura. Vascular: 2+ pulses symmetric in all extremities Cardiographics ECG: Sinus Cardiology Labs: 
Lab Results Component Value Date/Time Cholesterol, total 111 12/05/2019 12:25 AM  
 HDL Cholesterol 28 12/05/2019 12:25 AM  
 LDL, calculated 69.4 12/05/2019 12:25 AM  
 Triglyceride 68 12/05/2019 12:25 AM  
 CHOL/HDL Ratio 4.0 12/05/2019 12:25 AM  
 
 
Lab Results Component Value Date/Time Sodium 138 12/10/2019 03:16 AM  
 Potassium 3.6 12/10/2019 03:16 AM  
 Chloride 107 12/10/2019 03:16 AM  
 CO2 20 (L) 12/10/2019 03:16 AM  
 Anion gap 11 12/10/2019 03:16 AM  
 Glucose 89 12/10/2019 03:16 AM  
 BUN 21 (H) 12/10/2019 03:16 AM  
 Creatinine 0.98 12/10/2019 03:16 AM  
 BUN/Creatinine ratio 21 (H) 12/10/2019 03:16 AM  
 GFR est AA >60 12/10/2019 03:16 AM  
 GFR est non-AA >60 12/10/2019 03:16 AM  
 Calcium 8.9 12/10/2019 03:16 AM  
 Bilirubin, total 1.0 12/03/2019 03:28 PM  
 AST (SGOT) 16 12/03/2019 03:28 PM  
 Alk. phosphatase 131 (H) 12/03/2019 03:28 PM  
 Protein, total 6.6 12/03/2019 03:28 PM  
 Albumin 2.9 (L) 12/03/2019 03:28 PM  
 Globulin 3.7 12/03/2019 03:28 PM  
 A-G Ratio 0.8 (L) 12/03/2019 03:28 PM  
 ALT (SGPT) 16 12/03/2019 03:28 PM  
  
 
 
 Assessment: 
 
 Assessment:  
 
Diagnoses and all orders for this visit: 
 
1. Biventricular failure ([address] -     AMB POC EKG ROUTINE W/ 12 LEADS, INTER & REP 
 
2. S/P cardiac cath -     AMB POC EKG ROUTINE W/ 12 LEADS, INTER & REP 3. Essential hypertension 4. Ischemic cardiomyopathy 5. Coronary artery disease involving native coronary artery of native heart without angina pectoris 6. S/P PTCA (percutaneous transluminal coronary angioplasty) 7. Drug rash Other orders 
-     hydrOXYzine pamoate (VISTARIL) 25 mg capsule; Take 1 Cap by mouth three (3) times daily as needed for Itching for up to 14 days. -     methylPREDNISolone (MEDROL DOSEPACK) 4 mg tablet; As directed. -     clopidogrel (PLAVIX) 75 mg tab; Take 1 Tab by mouth daily. D/c brilinta ? Allergic drug rash ICD-10-CM ICD-9-CM 1. Biventricular failure (HCC) I50.82 428.9 AMB POC EKG ROUTINE W/ 12 LEADS, INTER & REP  
2. S/P cardiac cath Z98.890 V45.89 AMB POC EKG ROUTINE W/ 12 LEADS, INTER & REP 3. Essential hypertension I10 401.9 4. Ischemic cardiomyopathy I25.5 414.8 5. Coronary artery disease involving native coronary artery of native heart without angina pectoris I25.10 414.01   
6. S/P PTCA (percutaneous transluminal coronary angioplasty) Z98.61 V45.82   
7. Drug rash L27.0 693.0 Orders Placed This Encounter  AMB POC EKG ROUTINE W/ 12 LEADS, INTER & REP Order Specific Question:   Reason for Exam: Answer:   routine  hydrOXYzine pamoate (VISTARIL) 25 mg capsule Sig: Take 1 Cap by mouth three (3) times daily as needed for Itching for up to 14 days. Dispense:  21 Cap Refill:  0  
 methylPREDNISolone (MEDROL DOSEPACK) 4 mg tablet Sig: As directed. Dispense:  1 Dose Pack Refill:  0  
 clopidogrel (PLAVIX) 75 mg tab Sig: Take 1 Tab by mouth daily. D/c brilinta ? Allergic drug rash Dispense:  30 Tab Refill:  5 Plan: 1. Systolic heart failure: Ejection fraction 20%, denies dyspnea on exertion or chest pain [place] heart class I. Continue carvedilol and ARB. Continue diuretic. Continue LifeVest  
2. Multivessel atherosclerotic heart disease: Only viable territory as of the RCA underwent successful PTCA stenting. 3.  Left ventricular thrombus: On Eliquis minimum 6 months, duration to be determined after repeat echocardiogram in 90 days. 3.  Drug rash: We will discontinue Brilinta, changed to Plavix. Prescribed Medrol Dosepak and Atarax 5. Hyperlipidemia: Continue statin therapy repeat labs March 2020 Follow-up 2 weeks with Maru Abernathy NP (At next visit book 3 month ECHO, order 3 month labs, update drug allergy list if symptoms improve off brilinta) Sunday Round, NP Please note that this dictation was completed with [employer], the computer voice recognition software. Quite often unanticipated grammatical, syntax, homophones, and other interpretive errors are inadvertently transcribed by the computer software. Please disregard these errors. Please excuse any errors that have escaped final proofreading. Thank you. Patient seen and examined by me with nurse practitioner. I personally performed all components of the history, physical, and medical decision making and agree with the assessment and plan as noted.  
 
Terresa Goldberg, MD

## 2019-12-24 NOTE — PROGRESS NOTES
1. Have you been to the ER, urgent care clinic since your last visit? Hospitalized since your last visit? F/u from cardiac cath. 2. Have you seen or consulted any other health care providers outside of the [address] [place] [address] since your last visit? Include any pap smears or colon screening. No. 
 
 
 
Chief Complaint Patient presents with  
[facility] Follow Up  
  wearing life vest - pt denies any cardiac symptoms- pt notes rash and severe dry skin on face, arms, trunk since starting medications  New Patient

## 2020-01-01 ENCOUNTER — OFFICE VISIT (OUTPATIENT)
Dept: CARDIOLOGY CLINIC | Age: 40
End: 2020-01-01

## 2020-01-01 ENCOUNTER — TELEPHONE (OUTPATIENT)
Dept: CARDIOLOGY CLINIC | Age: 40
End: 2020-01-01

## 2020-01-01 VITALS
HEIGHT: 73 IN | HEART RATE: 70 BPM | SYSTOLIC BLOOD PRESSURE: 148 MMHG | WEIGHT: 175.7 LBS | BODY MASS INDEX: 23.29 KG/M2 | OXYGEN SATURATION: 98 % | RESPIRATION RATE: 18 BRPM | DIASTOLIC BLOOD PRESSURE: 90 MMHG

## 2020-01-01 DIAGNOSIS — I25.10 CORONARY ARTERY DISEASE INVOLVING NATIVE CORONARY ARTERY OF NATIVE HEART WITHOUT ANGINA PECTORIS: ICD-10-CM

## 2020-01-01 DIAGNOSIS — Z98.61 S/P PTCA (PERCUTANEOUS TRANSLUMINAL CORONARY ANGIOPLASTY): ICD-10-CM

## 2020-01-01 DIAGNOSIS — I25.5 ISCHEMIC CARDIOMYOPATHY: Primary | ICD-10-CM

## 2020-01-01 DIAGNOSIS — I10 ESSENTIAL HYPERTENSION: ICD-10-CM

## 2020-01-01 RX ORDER — CARVEDILOL 12.5 MG/1
12.5 TABLET ORAL 2 TIMES DAILY
Qty: 60 TAB | Refills: 1 | Status: SHIPPED | OUTPATIENT
Start: 2020-01-01 | End: 2021-01-01

## 2020-01-01 RX ORDER — ATORVASTATIN CALCIUM 40 MG/1
40 TABLET, FILM COATED ORAL
Qty: 30 TAB | Refills: 1 | Status: SHIPPED | OUTPATIENT
Start: 2020-01-01 | End: 2021-01-01

## 2020-01-01 RX ORDER — LOSARTAN POTASSIUM 50 MG/1
50 TABLET ORAL DAILY
Qty: 30 TAB | Refills: 1 | Status: SHIPPED | OUTPATIENT
Start: 2020-01-01 | End: 2021-01-01

## 2020-01-01 RX ORDER — ASPIRIN 81 MG/1
81 TABLET ORAL DAILY
Qty: 30 TAB | Refills: 6 | Status: SHIPPED | OUTPATIENT
Start: 2020-01-01

## 2020-01-01 RX ORDER — FUROSEMIDE 40 MG/1
40 TABLET ORAL DAILY
Qty: 30 TAB | Refills: 1 | Status: SHIPPED | OUTPATIENT
Start: 2020-01-01 | End: 2021-01-01

## 2020-01-01 RX ORDER — LOSARTAN POTASSIUM 50 MG/1
50 TABLET ORAL DAILY
Qty: 30 TAB | Refills: 6 | Status: SHIPPED | OUTPATIENT
Start: 2020-01-01 | End: 2020-01-01 | Stop reason: SDUPTHER

## 2020-01-01 RX ORDER — CLOPIDOGREL BISULFATE 75 MG/1
75 TABLET ORAL DAILY
Qty: 30 TAB | Refills: 1 | Status: SHIPPED | OUTPATIENT
Start: 2020-01-01 | End: 2021-01-01

## 2020-01-01 RX ORDER — CLOPIDOGREL BISULFATE 75 MG/1
75 TABLET ORAL DAILY
Qty: 30 TAB | Refills: 6 | Status: SHIPPED | OUTPATIENT
Start: 2020-01-01 | End: 2020-01-01 | Stop reason: SDUPTHER

## 2020-01-02 ENCOUNTER — TELEPHONE (OUTPATIENT)
Dept: CARDIOLOGY CLINIC | Age: 40
End: 2020-01-02

## 2020-01-03 NOTE — TELEPHONE ENCOUNTER
Returned call, spoke to [facility] on [address], she advised that pt finished his prednisone on 12/31/19 and it helped - the rash was better. The next day it did start to come back. He has it on his arms, back and stomach again, not his face yet. No issues with switching from Brilinta to Plavix. Pt did stop his Eliquis for 1 day to see if this would help and he reported feeling better. Sister did advise him to continue his Eliquis, I advised it is very important for him to continue his Eliquis at this point. Advised to take his Hydroxyzine as needed. She wants to know if there is another medication they can try at this point. Advised I would send to  for further advice. [facility] verbalized understanding.

## 2020-01-06 NOTE — TELEPHONE ENCOUNTER
Message   Received: 2 days ago   Message Contents   MD Torrey Caballero, LPN   Caller: Unspecified (4 days ago,  3:58 PM)             No. He may need to see dermatology or PCP. Radha Castro, on PHI, advised her that  does not want pt to stop his Eliquis. Advised he can see dermatology or his PCP for this. She advised that his rash cleared up and is scabbing over starting this weekend. I advised that is great and if it does come back to f/u in office with a dermatologist or his PCP. She verbalized understanding.

## 2020-01-13 ENCOUNTER — DOCUMENTATION ONLY (OUTPATIENT)
Dept: CARDIOLOGY CLINIC | Age: 40
End: 2020-01-13

## 2020-01-13 NOTE — PROGRESS NOTES
Received fax from pharmacy asking for clarification on Plavix and Eliquis. Wrote note on fax stating that pt should be on both Eliquis and Plavix per . faxed to [facility] [phone] and received fax confirmation.

## 2020-03-09 ENCOUNTER — DOCUMENTATION ONLY (OUTPATIENT)
Dept: CARDIOLOGY CLINIC | Age: 40
End: 2020-03-09

## 2020-03-09 NOTE — PROGRESS NOTES
Received [employer] reorder form from Rep. Had  sign and date. Faxed to [phone] and received fax confirmation.

## 2020-04-24 ENCOUNTER — TELEPHONE (OUTPATIENT)
Dept: CARDIOLOGY CLINIC | Age: 40
End: 2020-04-24

## 2020-04-24 NOTE — TELEPHONE ENCOUNTER
Rosalba Fabian on [address] called in, she states pt is unable to work due to his heart, diabetes and his foot. She would like a note stating that he is unable to work due to his heart for her to file her taxes and claim him on her taxes. Advised her that  has not taken him out of work at his last visit and I am not sure he would due to his heart. I advised I would send the note and call her back on Monday. Advised she can contact the PCP for diabetes/foot issues for a note also. Rosalba Fabian verbalized understanding.

## 2020-04-27 NOTE — TELEPHONE ENCOUNTER
Message   Received: Today   Message Contents   MD Alee Silverman LPN   Caller: Unspecified (3 days ago, 11:47 AM)             What kind of work he does? The pt does not work. His sister wants to claim him as a dependent on her taxes since he lives with her. She needs a letter from you stating he is disable and cannot work due to his heart. I explained that I do not know if you will write this. She explained he does have diabetes and foot issue which is why he is not working either. I advised she could try PCP also. Please advise, thanks.

## 2020-04-27 NOTE — TELEPHONE ENCOUNTER
Message   Received: Today   Message Contents   Renetta Morgans, MD Victoria Harada, LPN   Caller: Unspecified (3 days ago, 11:47 AM)             Can't do that. Maura Reddy on [address], advised her that  will not be able to write that letter due to he has not taken him out of work. Advised she can call his PCP and see if they will write it due to his diabetes and foot issues. She verbalized understanding.

## 2020-04-30 ENCOUNTER — DOCUMENTATION ONLY (OUTPATIENT)
Dept: CARDIOLOGY CLINIC | Age: 40
End: 2020-04-30

## 2020-04-30 NOTE — PROGRESS NOTES
Called Lillian Sosa with [employer] life vest at [phone] and left message advising that pt's last visit was 12/24/19, last echo 12/4/19 and cardiac cath 12/7/19. Advised his appt has gotten canceled due to [place] and his next appt is 7. 9.20 and will probably have echo scheduled after that. Advised to call me back if he needs to be seen sooner for the life vest to stay active.

## 2020-05-04 ENCOUNTER — TELEPHONE (OUTPATIENT)
Dept: CARDIOLOGY CLINIC | Age: 40
End: 2020-05-04

## 2020-05-04 ENCOUNTER — DOCUMENTATION ONLY (OUTPATIENT)
Dept: CARDIOLOGY CLINIC | Age: 40
End: 2020-05-04

## 2020-05-04 NOTE — TELEPHONE ENCOUNTER
Spoke to Feliz at [facility], pt is coming up on TA care for life vest. His last appt 12/24/19 and was to f/u in 3 months. He will need a virtual visit for reorder. He will need that echo ordered per  at visit also. Will send to [facility] for scheduling.

## 2020-05-05 DIAGNOSIS — I25.5 ISCHEMIC CARDIOMYOPATHY: Primary | ICD-10-CM

## 2020-05-05 NOTE — TELEPHONE ENCOUNTER
Message   Received: Today   Message Contents   Mell HAMEED LPN   Caller: Unspecified Danny Guevara,  9:39 AM)             Can you have Dr. Joaquin Riggins put echo order in please      appt on 7/9/20. Message   Received: Today   Message Contents   MD Gavino Joshi LPN   Caller: Unspecified Danny Guevara,  9:39 AM)             Done. Noted, thanks.

## 2020-05-06 NOTE — TELEPHONE ENCOUNTER
Message   Received: Today   Message Contents   Chante Hamilton LPN; Collin Deutsch MD   Caller: Unspecified (2 days ago,  9:39 AM)             I got the pt scheduled for his echo which is on 5/11, but he does not have access to a phone until his sister get home from work which is not until 3:00pm, so he can not do a virtual visit or telephone only call at this time, he is scheduled for in office visit w/ Dr. Davies on 7/9        [place], [place].

## 2020-05-12 ENCOUNTER — TELEPHONE (OUTPATIENT)
Dept: CARDIOLOGY CLINIC | Age: 40
End: 2020-05-12

## 2020-05-12 NOTE — TELEPHONE ENCOUNTER
----- Message from Belle Caraballo MD sent at 5/12/2020  9:46 AM EDT -----  Inform him that on Echo LVEF has improved. He can discontinue lifevest. Continue meds and f/u with me        Called pt, spoke to sister Kar Rice on [address], advised on pt's echo LVEF has improved and he can stop wearing his life vest. Continue his current medications and f/u in office on 7/9/20. Advised to call [facility] regarding shipping back life vest. Kar Rice then advised that pt's Eliquis is too expensive and would like to switch to something else. Advised Coumadin will be the cheapest. Advised that involve office visits/INR checks. Advised I would ask  and if okay then I would send to our Coumadin nurse to get him scheduled. Kar Rice verbalized understanding.

## 2020-05-13 ENCOUNTER — DOCUMENTATION ONLY (OUTPATIENT)
Dept: CARDIOLOGY CLINIC | Age: 40
End: 2020-05-13

## 2020-05-13 NOTE — TELEPHONE ENCOUNTER
Yohan Mclean MD 21 hours ago (3:17 PM)      Per last office note 12/24/20 pt on Eliquis for 6 months or pending echo. Please advised if he needs to continue Eliquis and for how much longer? It is costly and will need to switch to something else if still need to be on medication. Thanks. Message   Received: Today   Message Contents   MD Rachel Chaudhry, LPN   Caller: Unspecified Eva Mckinnon,  3:01 PM)             He can stop it now.           Called [facility] on phi and left message to call me back regarding pt's Eliquis

## 2020-05-13 NOTE — TELEPHONE ENCOUNTER
Returned call, spoke to Lewis on [address], advised that pt can finish the bottle he has now and then stop the Eliquis. She verbalized it back to me. Advised to keep his f/u as scheduled. Lewis verbalized understanding.

## 2020-05-13 NOTE — PROGRESS NOTES
Faxed note to Ramone Suazo at [phone] stating that pt's life vest was d/c on 5/12/20 per . received fax confirmation.

## 2020-07-13 NOTE — PROGRESS NOTES
Chief Complaint Patient presents with  Cardiomyopathy 6 Month follow up -denies cardiac sx  Coronary Artery Disease Patient states he is taking potassium but does not recall the dose. ... 1. Have you been to the ER, urgent care clinic since your last visit? Hospitalized since your last visit? No  
 
2. Have you seen or consulted any other health care providers outside of the [address] [place] [address] since your last visit? Include any pap smears or colon screening.   No

## 2020-07-13 NOTE — PROGRESS NOTES
[facility] @ [address] [place] [address] Subjective/HPI:  
 
Keiko Nava is a 44 y.o. male is here for a f/u appt. He has a past med hx of an acute systolic heart failure ejection fraction 20%, three-vessel disease cardiac MRI showing only viable RCA territory underwent PTCA stenting in 12/19. He denies CP, SOB/DANIELSON, orthopnea, PND, edema, palpitations, lightheadedness or syncope. He ran out of Plavix about 2-3 weeks ago.  
  
PCP Provider None Past Medical History:  
Diagnosis Date  Diabetes ([address] Past Surgical History:  
Procedure Laterality Date  HX AMPUTATION  10/02/2012 Right 3rd toe No Known Allergies Family History Problem Relation Age of Onset  Heart Disease Maternal Grandmother  Cancer Paternal Grandmother   
     Caralyn McCune  Stroke Paternal Grandfather Current Outpatient Medications Medication Sig  
 atorvastatin (LIPITOR) 40 mg tablet Take 1 Tab by mouth nightly.  carvedilol (COREG) 12.5 mg tablet Take 1 Tab by mouth two (2) times a day.  furosemide (LASIX) 40 mg tablet Take 1 Tab by mouth daily.  losartan (COZAAR) 25 mg tablet Take 1 Tab by mouth daily.  methylPREDNISolone (MEDROL DOSEPACK) 4 mg tablet As directed.  clopidogrel (PLAVIX) 75 mg tab Take 1 Tab by mouth daily. D/c brilinta ? Allergic drug rash  apixaban (ELIQUIS) 5 mg tablet Take 1 Tab by mouth every twelve (12) hours. No current facility-administered medications for this visit. Vitals:  
 07/13/20 1036 BP: 152/86 Weight: 175 lb 11.2 oz (79.7 kg) Height: 6' 1\" (1.854 m) Social History Socioeconomic History  Marital status: SINGLE Spouse name: Not on file  Number of children: Not on file  Years of education: Not on file  Highest education level: Not on file Occupational History  Not on file Social Needs  Financial resource strain: Not on file  Food insecurity Worry: Not on file Inability: Not on file  Transportation needs Medical: Not on file Non-medical: Not on file Tobacco Use  Smoking status: Former Smoker Packs/day: 1.00 Years: 21.00 Pack years: 21.00 Types: Cigarettes Last attempt to quit: 2019 Years since quittin.6  Smokeless tobacco: Never Used Substance and Sexual Activity  Alcohol use: No  
 Drug use: Never  Sexual activity: Not on file Lifestyle  Physical activity Days per week: Not on file Minutes per session: Not on file  Stress: Not on file Relationships  Social connections Talks on phone: Not on file Gets together: Not on file Attends Protestant service: Not on file Active member of club or organization: Not on file Attends meetings of clubs or organizations: Not on file Relationship status: Not on file  Intimate partner violence Fear of current or ex partner: Not on file Emotionally abused: Not on file Physically abused: Not on file Forced sexual activity: Not on file Other Topics Concern  Not on file Social History Narrative  Not on file Review of Symptoms 11 systems reviewed, negative other than as stated in the HPI Physical Exam:   
 
General: Well developed, in no acute distress, cooperative and alert HEENT: No carotid bruits, no JVD, trach is midline. Neck Supple, PERRL, EOM intact. Heart:  Normal S1/S2 negative S3 or S4. Regular, no murmur, gallop or rub. Respiratory: Clear bilaterally x 4, no wheezing or rales Abdomen:   Soft, non-tender, no masses, bowel sounds are active. Extremities:  Trace hyacinth pedal edema, normal cap refill, no cyanosis, atraumatic. Neuro: A&Ox3, speech clear, gait stable. Skin: warm and dry Vascular: 2+ pulses symmetric in all extremities Cardiographics ECG: Sinus Rhythm Echo  · Normal cavity size, wall thickness and diastolic function.  Moderate systolic function. Estimated left ventricular ejection fraction is 35 - 40%. Hypokinesis of the apex, apical anterior, apical lateral, and mid to basal anterolateral duran. [place] · Moderately dilated left atrium. · Mild mitral valve regurgitation is present. · Severe pulmonary hypertension. Pulmonary arterial systolic pressure is 71 mmHg. Cardiology Labs: 
Lab Results Component Value Date/Time Cholesterol, total 111 12/05/2019 12:25 AM  
 HDL Cholesterol 28 12/05/2019 12:25 AM  
 LDL, calculated 69.4 12/05/2019 12:25 AM  
 Triglyceride 68 12/05/2019 12:25 AM  
 CHOL/HDL Ratio 4.0 12/05/2019 12:25 AM  
 
 
Lab Results Component Value Date/Time Sodium 138 12/10/2019 03:16 AM  
 Potassium 3.6 12/10/2019 03:16 AM  
 Chloride 107 12/10/2019 03:16 AM  
 CO2 20 (L) 12/10/2019 03:16 AM  
 Anion gap 11 12/10/2019 03:16 AM  
 Glucose 89 12/10/2019 03:16 AM  
 BUN 21 (H) 12/10/2019 03:16 AM  
 Creatinine 0.98 12/10/2019 03:16 AM  
 BUN/Creatinine ratio 21 (H) 12/10/2019 03:16 AM  
 GFR est AA >60 12/10/2019 03:16 AM  
 GFR est non-AA >60 12/10/2019 03:16 AM  
 Calcium 8.9 12/10/2019 03:16 AM  
 Bilirubin, total 1.0 12/03/2019 03:28 PM  
 Alk. phosphatase 131 (H) 12/03/2019 03:28 PM  
 Protein, total 6.6 12/03/2019 03:28 PM  
 Albumin 2.9 (L) 12/03/2019 03:28 PM  
 Globulin 3.7 12/03/2019 03:28 PM  
 A-G Ratio 0.8 (L) 12/03/2019 03:28 PM  
 ALT (SGPT) 16 12/03/2019 03:28 PM  
  
 
 
 Assessment: 
 
 Assessment:  
 
Diagnoses and all orders for this visit: 
 
1. Ischemic cardiomyopathy -     AMB POC EKG ROUTINE W/ 12 LEADS, INTER & REP 2. Coronary artery disease involving native coronary artery of native heart without angina pectoris 3. Essential hypertension ICD-10-CM ICD-9-CM 1. Ischemic cardiomyopathy  I25.5 414.8 AMB POC EKG ROUTINE W/ 12 LEADS, INTER & REP 2.  Coronary artery disease involving native coronary artery of native heart without angina pectoris  I25.10 414.01   
 3. Essential hypertension  I10 401.9 Orders Placed This Encounter  AMB POC EKG ROUTINE W/ 12 LEADS, INTER & REP Order Specific Question:   Reason for Exam: Answer:   routine Plan: 1. ICM: 12/19 Ejection fraction 20%. Echo 5/20 EF 35-40%. Asymptomatic. Appears euvolemic. Continue carvedilol and ARB. Continue diuretic. 2.  Multivessel atherosclerotic heart disease: Only viable territory as of the RCA underwent successful PTCA stenting 12/19. Restart Plavix, add ASA now that not on Eliquis. Cont BB, statin. 3.  Left ventricular thrombus: On Eliquis for 6 months. No thrombus on echo 5/20. Repeat echo in 6 months. 4.  Hyperlipidemia: Continue statin therapy repeat labs now. 5.  HTN: increased Losartan to 50mg every day. F/U in 6 months. Kenji Monroy NP Patient seen and examined by me with nurse practitioner. I personally performed all components of the history, physical, and medical decision making and agree with the assessment and plan as noted.  
 
Karen Brannon MD

## 2020-12-04 NOTE — TELEPHONE ENCOUNTER
Pt's sister calling on pt's behalf; pt needs a refill on these RX's please and thank you     losartan (COZAAR) 50 mg tablet  carvedilol (COREG) 12.5 mg tablet  atorvastatin (LIPITOR) 40 mg tablet  furosemide (LASIX) 40 mg tablet  clopidogreL (Plavix) 75 mg tab    Pt has a 6 month F/U in January     Thanks

## 2021-01-01 ENCOUNTER — HOSPITAL ENCOUNTER (EMERGENCY)
Age: 41
Discharge: ANOTHER HOSPITAL | End: 2021-05-04
Attending: EMERGENCY MEDICINE
Payer: MEDICAID

## 2021-01-01 ENCOUNTER — ANESTHESIA (OUTPATIENT)
Dept: SURGERY | Age: 41
DRG: 305 | End: 2021-01-01
Payer: MEDICAID

## 2021-01-01 ENCOUNTER — APPOINTMENT (OUTPATIENT)
Dept: MRI IMAGING | Age: 41
DRG: 305 | End: 2021-01-01
Attending: STUDENT IN AN ORGANIZED HEALTH CARE EDUCATION/TRAINING PROGRAM
Payer: MEDICAID

## 2021-01-01 ENCOUNTER — APPOINTMENT (OUTPATIENT)
Dept: VASCULAR SURGERY | Age: 41
DRG: 305 | End: 2021-01-01
Attending: INTERNAL MEDICINE
Payer: MEDICAID

## 2021-01-01 ENCOUNTER — TELEPHONE (OUTPATIENT)
Dept: FAMILY MEDICINE CLINIC | Age: 41
End: 2021-01-01

## 2021-01-01 ENCOUNTER — HOSPITAL ENCOUNTER (INPATIENT)
Age: 41
LOS: 8 days | Discharge: HOME | DRG: 305 | End: 2021-05-12
Attending: EMERGENCY MEDICINE | Admitting: STUDENT IN AN ORGANIZED HEALTH CARE EDUCATION/TRAINING PROGRAM
Payer: MEDICAID

## 2021-01-01 ENCOUNTER — DOCUMENTATION ONLY (OUTPATIENT)
Dept: FAMILY MEDICINE CLINIC | Age: 41
End: 2021-01-01

## 2021-01-01 ENCOUNTER — ANESTHESIA EVENT (OUTPATIENT)
Dept: SURGERY | Age: 41
DRG: 305 | End: 2021-01-01
Payer: MEDICAID

## 2021-01-01 ENCOUNTER — APPOINTMENT (OUTPATIENT)
Dept: GENERAL RADIOLOGY | Age: 41
End: 2021-01-01
Attending: EMERGENCY MEDICINE
Payer: MEDICAID

## 2021-01-01 VITALS
SYSTOLIC BLOOD PRESSURE: 140 MMHG | RESPIRATION RATE: 16 BRPM | HEART RATE: 62 BPM | OXYGEN SATURATION: 97 % | DIASTOLIC BLOOD PRESSURE: 91 MMHG | WEIGHT: 179.6 LBS | BODY MASS INDEX: 23.8 KG/M2 | HEIGHT: 73 IN | TEMPERATURE: 98.5 F

## 2021-01-01 VITALS
TEMPERATURE: 98.7 F | WEIGHT: 170 LBS | OXYGEN SATURATION: 99 % | BODY MASS INDEX: 22.53 KG/M2 | SYSTOLIC BLOOD PRESSURE: 141 MMHG | HEIGHT: 73 IN | RESPIRATION RATE: 18 BRPM | HEART RATE: 60 BPM | DIASTOLIC BLOOD PRESSURE: 89 MMHG

## 2021-01-01 DIAGNOSIS — A49.01 MSSA (METHICILLIN SUSCEPTIBLE STAPHYLOCOCCUS AUREUS) INFECTION: ICD-10-CM

## 2021-01-01 DIAGNOSIS — E11.628 DIABETIC FOOT INFECTION (HCC): ICD-10-CM

## 2021-01-01 DIAGNOSIS — E11.621 DIABETIC FOOT ULCER WITH OSTEOMYELITIS (HCC): Primary | ICD-10-CM

## 2021-01-01 DIAGNOSIS — L03.119 CELLULITIS OF FOOT: ICD-10-CM

## 2021-01-01 DIAGNOSIS — M86.171 OSTEOMYELITIS OF FOOT, RIGHT, ACUTE (HCC): Primary | ICD-10-CM

## 2021-01-01 DIAGNOSIS — R21 MACULOPAPULAR RASH: ICD-10-CM

## 2021-01-01 DIAGNOSIS — I25.5 ISCHEMIC CARDIOMYOPATHY: ICD-10-CM

## 2021-01-01 DIAGNOSIS — Z98.61 S/P PTCA (PERCUTANEOUS TRANSLUMINAL CORONARY ANGIOPLASTY): ICD-10-CM

## 2021-01-01 DIAGNOSIS — L08.9 DIABETIC FOOT INFECTION (HCC): ICD-10-CM

## 2021-01-01 DIAGNOSIS — L97.529 NON-HEALING ULCER OF LEFT FOOT, UNSPECIFIED ULCER STAGE (HCC): ICD-10-CM

## 2021-01-01 DIAGNOSIS — L08.9 WOUND INFECTION: ICD-10-CM

## 2021-01-01 DIAGNOSIS — M86.171 OSTEOMYELITIS OF FOOT, RIGHT, ACUTE (HCC): ICD-10-CM

## 2021-01-01 DIAGNOSIS — M86.9 DIABETIC FOOT ULCER WITH OSTEOMYELITIS (HCC): Primary | ICD-10-CM

## 2021-01-01 DIAGNOSIS — I10 ESSENTIAL HYPERTENSION: ICD-10-CM

## 2021-01-01 DIAGNOSIS — M86.9 OSTEOMYELITIS OF RIGHT FOOT, UNSPECIFIED TYPE (HCC): Primary | ICD-10-CM

## 2021-01-01 DIAGNOSIS — E11.69 DIABETIC FOOT ULCER WITH OSTEOMYELITIS (HCC): Primary | ICD-10-CM

## 2021-01-01 DIAGNOSIS — T14.8XXA WOUND INFECTION: ICD-10-CM

## 2021-01-01 DIAGNOSIS — Z20.818 EXPOSURE TO MRSA: ICD-10-CM

## 2021-01-01 DIAGNOSIS — E11.65 POORLY CONTROLLED DIABETES MELLITUS (HCC): ICD-10-CM

## 2021-01-01 DIAGNOSIS — A49.02 HOSPITAL ACQUIRED MRSA INFECTION: ICD-10-CM

## 2021-01-01 DIAGNOSIS — L97.509 DIABETIC FOOT ULCER WITH OSTEOMYELITIS (HCC): Primary | ICD-10-CM

## 2021-01-01 LAB
ACID FAST STN SPEC: NEGATIVE
ALBUMIN SERPL-MCNC: 3.3 G/DL (ref 3.5–5)
ALBUMIN/GLOB SERPL: 0.8 {RATIO} (ref 1.1–2.2)
ALP SERPL-CCNC: 124 U/L (ref 45–117)
ALT SERPL-CCNC: 44 U/L (ref 12–78)
ANION GAP SERPL CALC-SCNC: 11 MMOL/L (ref 5–15)
ANION GAP SERPL CALC-SCNC: 4 MMOL/L (ref 5–15)
ANION GAP SERPL CALC-SCNC: 5 MMOL/L (ref 5–15)
ANION GAP SERPL CALC-SCNC: 5 MMOL/L (ref 5–15)
ANION GAP SERPL CALC-SCNC: 6 MMOL/L (ref 5–15)
AST SERPL-CCNC: 32 U/L (ref 15–37)
BACTERIA SPEC CULT: ABNORMAL
BACTERIA SPEC CULT: NORMAL
BASOPHILS # BLD: 0 K/UL (ref 0–0.1)
BASOPHILS NFR BLD: 0 % (ref 0–1)
BILIRUB SERPL-MCNC: 1.4 MG/DL (ref 0.2–1)
BUN SERPL-MCNC: 13 MG/DL (ref 6–20)
BUN SERPL-MCNC: 15 MG/DL (ref 6–20)
BUN SERPL-MCNC: 16 MG/DL (ref 6–20)
BUN SERPL-MCNC: 18 MG/DL (ref 6–20)
BUN SERPL-MCNC: 18 MG/DL (ref 6–20)
BUN/CREAT SERPL: 16 (ref 12–20)
BUN/CREAT SERPL: 17 (ref 12–20)
BUN/CREAT SERPL: 19 (ref 12–20)
BUN/CREAT SERPL: 19 (ref 12–20)
BUN/CREAT SERPL: 20 (ref 12–20)
CALCIUM SERPL-MCNC: 8.4 MG/DL (ref 8.5–10.1)
CALCIUM SERPL-MCNC: 8.6 MG/DL (ref 8.5–10.1)
CALCIUM SERPL-MCNC: 8.7 MG/DL (ref 8.5–10.1)
CALCIUM SERPL-MCNC: 8.8 MG/DL (ref 8.5–10.1)
CALCIUM SERPL-MCNC: 9.1 MG/DL (ref 8.5–10.1)
CHLORIDE SERPL-SCNC: 107 MMOL/L (ref 97–108)
CHLORIDE SERPL-SCNC: 108 MMOL/L (ref 97–108)
CHLORIDE SERPL-SCNC: 108 MMOL/L (ref 97–108)
CHLORIDE SERPL-SCNC: 109 MMOL/L (ref 97–108)
CHLORIDE SERPL-SCNC: 98 MMOL/L (ref 97–108)
CK SERPL-CCNC: 23 U/L (ref 39–308)
CO2 SERPL-SCNC: 25 MMOL/L (ref 21–32)
CO2 SERPL-SCNC: 26 MMOL/L (ref 21–32)
CREAT SERPL-MCNC: 0.78 MG/DL (ref 0.7–1.3)
CREAT SERPL-MCNC: 0.81 MG/DL (ref 0.7–1.3)
CREAT SERPL-MCNC: 0.82 MG/DL (ref 0.7–1.3)
CREAT SERPL-MCNC: 0.95 MG/DL (ref 0.7–1.3)
CREAT SERPL-MCNC: 1.09 MG/DL (ref 0.7–1.3)
CRP SERPL-MCNC: 4.76 MG/DL (ref 0–0.6)
DATE LAST DOSE: ABNORMAL
DIFFERENTIAL METHOD BLD: ABNORMAL
EOSINOPHIL # BLD: 0.1 K/UL (ref 0–0.4)
EOSINOPHIL NFR BLD: 1 % (ref 0–7)
ERYTHROCYTE [DISTWIDTH] IN BLOOD BY AUTOMATED COUNT: 13.1 % (ref 11.5–14.5)
ERYTHROCYTE [DISTWIDTH] IN BLOOD BY AUTOMATED COUNT: 13.1 % (ref 11.5–14.5)
ERYTHROCYTE [DISTWIDTH] IN BLOOD BY AUTOMATED COUNT: 13.2 % (ref 11.5–14.5)
ERYTHROCYTE [DISTWIDTH] IN BLOOD BY AUTOMATED COUNT: 13.2 % (ref 11.5–14.5)
ERYTHROCYTE [DISTWIDTH] IN BLOOD BY AUTOMATED COUNT: 13.5 % (ref 11.5–14.5)
ERYTHROCYTE [SEDIMENTATION RATE] IN BLOOD: 17 MM/HR (ref 0–15)
ERYTHROCYTE [SEDIMENTATION RATE] IN BLOOD: 62 MM/HR (ref 0–15)
EST. AVERAGE GLUCOSE BLD GHB EST-MCNC: 114 MG/DL
GLOBULIN SER CALC-MCNC: 3.9 G/DL (ref 2–4)
GLUCOSE BLD STRIP.AUTO-MCNC: 103 MG/DL (ref 65–100)
GLUCOSE BLD STRIP.AUTO-MCNC: 103 MG/DL (ref 65–100)
GLUCOSE BLD STRIP.AUTO-MCNC: 105 MG/DL (ref 65–100)
GLUCOSE BLD STRIP.AUTO-MCNC: 108 MG/DL (ref 65–100)
GLUCOSE BLD STRIP.AUTO-MCNC: 114 MG/DL (ref 65–100)
GLUCOSE BLD STRIP.AUTO-MCNC: 115 MG/DL (ref 65–100)
GLUCOSE BLD STRIP.AUTO-MCNC: 116 MG/DL (ref 65–100)
GLUCOSE BLD STRIP.AUTO-MCNC: 122 MG/DL (ref 65–100)
GLUCOSE BLD STRIP.AUTO-MCNC: 129 MG/DL (ref 65–100)
GLUCOSE BLD STRIP.AUTO-MCNC: 131 MG/DL (ref 65–100)
GLUCOSE BLD STRIP.AUTO-MCNC: 137 MG/DL (ref 65–100)
GLUCOSE BLD STRIP.AUTO-MCNC: 146 MG/DL (ref 65–100)
GLUCOSE BLD STRIP.AUTO-MCNC: 147 MG/DL (ref 65–100)
GLUCOSE BLD STRIP.AUTO-MCNC: 155 MG/DL (ref 65–100)
GLUCOSE BLD STRIP.AUTO-MCNC: 162 MG/DL (ref 65–100)
GLUCOSE BLD STRIP.AUTO-MCNC: 166 MG/DL (ref 65–117)
GLUCOSE BLD STRIP.AUTO-MCNC: 207 MG/DL (ref 65–100)
GLUCOSE BLD STRIP.AUTO-MCNC: 211 MG/DL (ref 65–117)
GLUCOSE BLD STRIP.AUTO-MCNC: 216 MG/DL (ref 65–117)
GLUCOSE BLD STRIP.AUTO-MCNC: 216 MG/DL (ref 65–117)
GLUCOSE BLD STRIP.AUTO-MCNC: 232 MG/DL (ref 65–100)
GLUCOSE BLD STRIP.AUTO-MCNC: 234 MG/DL (ref 65–117)
GLUCOSE BLD STRIP.AUTO-MCNC: 265 MG/DL (ref 65–117)
GLUCOSE BLD STRIP.AUTO-MCNC: 289 MG/DL (ref 65–117)
GLUCOSE BLD STRIP.AUTO-MCNC: 327 MG/DL (ref 65–100)
GLUCOSE BLD STRIP.AUTO-MCNC: 64 MG/DL (ref 65–100)
GLUCOSE BLD STRIP.AUTO-MCNC: 70 MG/DL (ref 65–100)
GLUCOSE BLD STRIP.AUTO-MCNC: 78 MG/DL (ref 65–100)
GLUCOSE BLD STRIP.AUTO-MCNC: 81 MG/DL (ref 65–100)
GLUCOSE BLD STRIP.AUTO-MCNC: 87 MG/DL (ref 65–100)
GLUCOSE BLD STRIP.AUTO-MCNC: 88 MG/DL (ref 65–100)
GLUCOSE BLD STRIP.AUTO-MCNC: 88 MG/DL (ref 65–100)
GLUCOSE BLD STRIP.AUTO-MCNC: 91 MG/DL (ref 65–100)
GLUCOSE BLD STRIP.AUTO-MCNC: 97 MG/DL (ref 65–100)
GLUCOSE SERPL-MCNC: 103 MG/DL (ref 65–100)
GLUCOSE SERPL-MCNC: 180 MG/DL (ref 65–100)
GLUCOSE SERPL-MCNC: 83 MG/DL (ref 65–100)
GLUCOSE SERPL-MCNC: 89 MG/DL (ref 65–100)
GLUCOSE SERPL-MCNC: 97 MG/DL (ref 65–100)
GRAM STN SPEC: ABNORMAL
HBA1C MFR BLD: 5.6 % (ref 4–5.6)
HCT VFR BLD AUTO: 33.5 % (ref 36.6–50.3)
HCT VFR BLD AUTO: 33.9 % (ref 36.6–50.3)
HCT VFR BLD AUTO: 35.1 % (ref 36.6–50.3)
HCT VFR BLD AUTO: 35.5 % (ref 36.6–50.3)
HCT VFR BLD AUTO: 36.3 % (ref 36.6–50.3)
HGB BLD-MCNC: 11 G/DL (ref 12.1–17)
HGB BLD-MCNC: 11.1 G/DL (ref 12.1–17)
HGB BLD-MCNC: 11.6 G/DL (ref 12.1–17)
HGB BLD-MCNC: 11.7 G/DL (ref 12.1–17)
HGB BLD-MCNC: 12.1 G/DL (ref 12.1–17)
IMM GRANULOCYTES # BLD AUTO: 0 K/UL (ref 0–0.04)
IMM GRANULOCYTES NFR BLD AUTO: 0 % (ref 0–0.5)
LEFT ABI: 1.2
LEFT ANTERIOR TIBIAL: 176 MMHG
LEFT ARM BP: 147 MMHG
LEFT ATA BP LEVEL: NORMAL
LEFT POSTERIOR TIBIAL: 176 MMHG
LEFT TBI: 0.84
LEFT TOE PRESSURE: 123 MMHG
LYMPHOCYTES # BLD: 0.9 K/UL (ref 0.8–3.5)
LYMPHOCYTES NFR BLD: 13 % (ref 12–49)
MCH RBC QN AUTO: 27.6 PG (ref 26–34)
MCH RBC QN AUTO: 27.7 PG (ref 26–34)
MCH RBC QN AUTO: 27.8 PG (ref 26–34)
MCH RBC QN AUTO: 27.8 PG (ref 26–34)
MCH RBC QN AUTO: 28.1 PG (ref 26–34)
MCHC RBC AUTO-ENTMCNC: 32.4 G/DL (ref 30–36.5)
MCHC RBC AUTO-ENTMCNC: 32.7 G/DL (ref 30–36.5)
MCHC RBC AUTO-ENTMCNC: 33.1 G/DL (ref 30–36.5)
MCHC RBC AUTO-ENTMCNC: 33.3 G/DL (ref 30–36.5)
MCHC RBC AUTO-ENTMCNC: 33.3 G/DL (ref 30–36.5)
MCV RBC AUTO: 83.3 FL (ref 80–99)
MCV RBC AUTO: 83.5 FL (ref 80–99)
MCV RBC AUTO: 84.2 FL (ref 80–99)
MCV RBC AUTO: 84.9 FL (ref 80–99)
MCV RBC AUTO: 85 FL (ref 80–99)
MONOCYTES # BLD: 0.1 K/UL (ref 0–1)
MONOCYTES NFR BLD: 1 % (ref 5–13)
MYCOBACTERIUM SPEC QL CULT: NEGATIVE
NEUTS BAND NFR BLD MANUAL: 1 %
NEUTS SEG # BLD: 6 K/UL (ref 1.8–8)
NEUTS SEG NFR BLD: 84 % (ref 32–75)
NRBC # BLD: 0 K/UL (ref 0–0.01)
NRBC BLD-RTO: 0 PER 100 WBC
PLATELET # BLD AUTO: 134 K/UL (ref 150–400)
PLATELET # BLD AUTO: 135 K/UL (ref 150–400)
PLATELET # BLD AUTO: 143 K/UL (ref 150–400)
PLATELET # BLD AUTO: 144 K/UL (ref 150–400)
PLATELET # BLD AUTO: 155 K/UL (ref 150–400)
PMV BLD AUTO: 10.8 FL (ref 8.9–12.9)
PMV BLD AUTO: 11.3 FL (ref 8.9–12.9)
PMV BLD AUTO: 11.5 FL (ref 8.9–12.9)
PMV BLD AUTO: 11.7 FL (ref 8.9–12.9)
PMV BLD AUTO: 12 FL (ref 8.9–12.9)
POTASSIUM SERPL-SCNC: 3.8 MMOL/L (ref 3.5–5.1)
POTASSIUM SERPL-SCNC: 3.9 MMOL/L (ref 3.5–5.1)
POTASSIUM SERPL-SCNC: 3.9 MMOL/L (ref 3.5–5.1)
POTASSIUM SERPL-SCNC: 4 MMOL/L (ref 3.5–5.1)
POTASSIUM SERPL-SCNC: 4 MMOL/L (ref 3.5–5.1)
PROT SERPL-MCNC: 7.2 G/DL (ref 6.4–8.2)
RBC # BLD AUTO: 3.99 M/UL (ref 4.1–5.7)
RBC # BLD AUTO: 4.01 M/UL (ref 4.1–5.7)
RBC # BLD AUTO: 4.17 M/UL (ref 4.1–5.7)
RBC # BLD AUTO: 4.18 M/UL (ref 4.1–5.7)
RBC # BLD AUTO: 4.36 M/UL (ref 4.1–5.7)
RBC MORPH BLD: ABNORMAL
REPORTED DOSE,DOSE: ABNORMAL UNITS
REPORTED DOSE/TIME,TMG: ABNORMAL
RIGHT ABI: 1.05
RIGHT ANTERIOR TIBIAL: 147 MMHG
RIGHT ARM BP: 140 MMHG
RIGHT ATA BP LEVEL: NORMAL
RIGHT POSTERIOR TIBIAL: 155 MMHG
RIGHT TBI: 0.99
RIGHT TOE PRESSURE: 145 MMHG
SERVICE CMNT-IMP: ABNORMAL
SERVICE CMNT-IMP: NORMAL
SODIUM SERPL-SCNC: 135 MMOL/L (ref 136–145)
SODIUM SERPL-SCNC: 137 MMOL/L (ref 136–145)
SODIUM SERPL-SCNC: 137 MMOL/L (ref 136–145)
SODIUM SERPL-SCNC: 139 MMOL/L (ref 136–145)
SODIUM SERPL-SCNC: 139 MMOL/L (ref 136–145)
SPECIMEN PREPARATION: NORMAL
SPECIMEN SOURCE: NORMAL
VANCOMYCIN TROUGH SERPL-MCNC: 18.1 UG/ML (ref 5–10)
WBC # BLD AUTO: 5 K/UL (ref 4.1–11.1)
WBC # BLD AUTO: 5.3 K/UL (ref 4.1–11.1)
WBC # BLD AUTO: 6 K/UL (ref 4.1–11.1)
WBC # BLD AUTO: 7.1 K/UL (ref 4.1–11.1)
WBC # BLD AUTO: 8.5 K/UL (ref 4.1–11.1)

## 2021-01-01 PROCEDURE — 74011250636 HC RX REV CODE- 250/636: Performed by: STUDENT IN AN ORGANIZED HEALTH CARE EDUCATION/TRAINING PROGRAM

## 2021-01-01 PROCEDURE — 87205 SMEAR GRAM STAIN: CPT

## 2021-01-01 PROCEDURE — 85027 COMPLETE CBC AUTOMATED: CPT

## 2021-01-01 PROCEDURE — 74011000258 HC RX REV CODE- 258: Performed by: NURSE ANESTHETIST, CERTIFIED REGISTERED

## 2021-01-01 PROCEDURE — 65270000029 HC RM PRIVATE

## 2021-01-01 PROCEDURE — 80202 ASSAY OF VANCOMYCIN: CPT

## 2021-01-01 PROCEDURE — 82962 GLUCOSE BLOOD TEST: CPT

## 2021-01-01 PROCEDURE — 36415 COLL VENOUS BLD VENIPUNCTURE: CPT

## 2021-01-01 PROCEDURE — 74011250636 HC RX REV CODE- 250/636: Performed by: PODIATRIST

## 2021-01-01 PROCEDURE — 99232 SBSQ HOSP IP/OBS MODERATE 35: CPT | Performed by: INTERNAL MEDICINE

## 2021-01-01 PROCEDURE — 73718 MRI LOWER EXTREMITY W/O DYE: CPT

## 2021-01-01 PROCEDURE — 74011000250 HC RX REV CODE- 250: Performed by: NURSE ANESTHETIST, CERTIFIED REGISTERED

## 2021-01-01 PROCEDURE — 88307 TISSUE EXAM BY PATHOLOGIST: CPT

## 2021-01-01 PROCEDURE — 99284 EMERGENCY DEPT VISIT MOD MDM: CPT

## 2021-01-01 PROCEDURE — 86140 C-REACTIVE PROTEIN: CPT

## 2021-01-01 PROCEDURE — 74011250637 HC RX REV CODE- 250/637: Performed by: PODIATRIST

## 2021-01-01 PROCEDURE — 2709999900 HC NON-CHARGEABLE SUPPLY

## 2021-01-01 PROCEDURE — 74011000250 HC RX REV CODE- 250: Performed by: PODIATRIST

## 2021-01-01 PROCEDURE — 76210000006 HC OR PH I REC 0.5 TO 1 HR: Performed by: PODIATRIST

## 2021-01-01 PROCEDURE — 0Y6M0ZD DETACHMENT AT RIGHT FOOT, PARTIAL 4TH RAY, OPEN APPROACH: ICD-10-PCS | Performed by: PODIATRIST

## 2021-01-01 PROCEDURE — 74011250637 HC RX REV CODE- 250/637: Performed by: STUDENT IN AN ORGANIZED HEALTH CARE EDUCATION/TRAINING PROGRAM

## 2021-01-01 PROCEDURE — 85025 COMPLETE CBC W/AUTO DIFF WBC: CPT

## 2021-01-01 PROCEDURE — 83036 HEMOGLOBIN GLYCOSYLATED A1C: CPT

## 2021-01-01 PROCEDURE — 93922 UPR/L XTREMITY ART 2 LEVELS: CPT

## 2021-01-01 PROCEDURE — 74011250636 HC RX REV CODE- 250/636: Performed by: INTERNAL MEDICINE

## 2021-01-01 PROCEDURE — 87186 SC STD MICRODIL/AGAR DIL: CPT

## 2021-01-01 PROCEDURE — 85652 RBC SED RATE AUTOMATED: CPT

## 2021-01-01 PROCEDURE — 77030000032 HC CUF TRNQT ZIMM -B: Performed by: PODIATRIST

## 2021-01-01 PROCEDURE — 74011250637 HC RX REV CODE- 250/637: Performed by: GENERAL ACUTE CARE HOSPITAL

## 2021-01-01 PROCEDURE — 74011000258 HC RX REV CODE- 258: Performed by: PODIATRIST

## 2021-01-01 PROCEDURE — 74011250636 HC RX REV CODE- 250/636: Performed by: GENERAL ACUTE CARE HOSPITAL

## 2021-01-01 PROCEDURE — 76010000153 HC OR TIME 1.5 TO 2 HR: Performed by: PODIATRIST

## 2021-01-01 PROCEDURE — 2709999900 HC NON-CHARGEABLE SUPPLY: Performed by: PODIATRIST

## 2021-01-01 PROCEDURE — 74011000258 HC RX REV CODE- 258: Performed by: STUDENT IN AN ORGANIZED HEALTH CARE EDUCATION/TRAINING PROGRAM

## 2021-01-01 PROCEDURE — 96365 THER/PROPH/DIAG IV INF INIT: CPT

## 2021-01-01 PROCEDURE — C1713 ANCHOR/SCREW BN/BN,TIS/BN: HCPCS | Performed by: PODIATRIST

## 2021-01-01 PROCEDURE — 80053 COMPREHEN METABOLIC PANEL: CPT

## 2021-01-01 PROCEDURE — 0Y6M0ZC DETACHMENT AT RIGHT FOOT, PARTIAL 3RD RAY, OPEN APPROACH: ICD-10-PCS | Performed by: PODIATRIST

## 2021-01-01 PROCEDURE — 96367 TX/PROPH/DG ADDL SEQ IV INF: CPT

## 2021-01-01 PROCEDURE — 73630 X-RAY EXAM OF FOOT: CPT

## 2021-01-01 PROCEDURE — 82550 ASSAY OF CK (CPK): CPT

## 2021-01-01 PROCEDURE — 87070 CULTURE OTHR SPECIMN AEROBIC: CPT

## 2021-01-01 PROCEDURE — 74011636637 HC RX REV CODE- 636/637: Performed by: PODIATRIST

## 2021-01-01 PROCEDURE — 74011636637 HC RX REV CODE- 636/637: Performed by: STUDENT IN AN ORGANIZED HEALTH CARE EDUCATION/TRAINING PROGRAM

## 2021-01-01 PROCEDURE — 87075 CULTR BACTERIA EXCEPT BLOOD: CPT

## 2021-01-01 PROCEDURE — 74011000258 HC RX REV CODE- 258: Performed by: INTERNAL MEDICINE

## 2021-01-01 PROCEDURE — 74011250636 HC RX REV CODE- 250/636: Performed by: NURSE ANESTHETIST, CERTIFIED REGISTERED

## 2021-01-01 PROCEDURE — 77030013079 HC BLNKT BAIR HGGR 3M -A: Performed by: ANESTHESIOLOGY

## 2021-01-01 PROCEDURE — 77030016653 HC BUR OVL4 STRY -B: Performed by: PODIATRIST

## 2021-01-01 PROCEDURE — 99233 SBSQ HOSP IP/OBS HIGH 50: CPT | Performed by: INTERNAL MEDICINE

## 2021-01-01 PROCEDURE — 74011000250 HC RX REV CODE- 250: Performed by: STUDENT IN AN ORGANIZED HEALTH CARE EDUCATION/TRAINING PROGRAM

## 2021-01-01 PROCEDURE — 87040 BLOOD CULTURE FOR BACTERIA: CPT

## 2021-01-01 PROCEDURE — 87077 CULTURE AEROBIC IDENTIFY: CPT

## 2021-01-01 PROCEDURE — 87116 MYCOBACTERIA CULTURE: CPT

## 2021-01-01 PROCEDURE — 87102 FUNGUS ISOLATION CULTURE: CPT

## 2021-01-01 PROCEDURE — 77030031139 HC SUT VCRL2 J&J -A: Performed by: PODIATRIST

## 2021-01-01 PROCEDURE — 80048 BASIC METABOLIC PNL TOTAL CA: CPT

## 2021-01-01 PROCEDURE — 88311 DECALCIFY TISSUE: CPT

## 2021-01-01 PROCEDURE — 74011000258 HC RX REV CODE- 258: Performed by: EMERGENCY MEDICINE

## 2021-01-01 PROCEDURE — 88305 TISSUE EXAM BY PATHOLOGIST: CPT

## 2021-01-01 PROCEDURE — 74011250636 HC RX REV CODE- 250/636: Performed by: EMERGENCY MEDICINE

## 2021-01-01 PROCEDURE — 77030002916 HC SUT ETHLN J&J -A: Performed by: PODIATRIST

## 2021-01-01 PROCEDURE — 0Y6M0Z9 DETACHMENT AT RIGHT FOOT, PARTIAL 1ST RAY, OPEN APPROACH: ICD-10-PCS | Performed by: PODIATRIST

## 2021-01-01 PROCEDURE — 96366 THER/PROPH/DIAG IV INF ADDON: CPT

## 2021-01-01 PROCEDURE — 76060000034 HC ANESTHESIA 1.5 TO 2 HR: Performed by: PODIATRIST

## 2021-01-01 DEVICE — STIMULAN® RAPID CURE PROVIDED STERILE FOR SINGLE PATIENT USE. STIMULAN® RAPID CURE CONTAINS CALCIUM SULFATE POWDER AND MIXING SOLUTION IN PRE-MEASURED QUANTITIES SO THAT WHEN MIXED TOGETHER IN A STERILE MIXING BOWL, THE RESULTANT PASTE IS TO BE DIGITALLY PACKED INTO OPEN BONE VOID/GAP TO SET INSITU OR PLACED INTO THE MOULD PROVIDED, THE MIXTURE SETS TO FORM BEADS. THE BIODEGRADABLE, RADIOPAQUE BEADS ARE RESORBED IN APPROXIMATELY 30 – 60 DAYS WHEN USED IN ACCORDANCE WITH THE DEVICE LABELLING. STIMULAN® RAPID CURE IS MANUFACTURED FROM SYNTHETIC IMPLANT GRADE CALCIUM SULFATE DIHYDRATE(CASO4.2H2O) THAT RESORBS AND IS REPLACED WITH BONE DURING THE HEALING PROCESS. ALSO, AS THE BONE VOID FILLER BEADS ARE BIODEGRADABLE AND BIOCOMPATIBLE, THEY MAY BE USED AT AN INFECTED SITE.
Type: IMPLANTABLE DEVICE | Site: FOOT | Status: FUNCTIONAL
Brand: STIMULAN® RAPID CURE

## 2021-01-01 RX ORDER — PROPOFOL 10 MG/ML
INJECTION, EMULSION INTRAVENOUS
Status: DISCONTINUED | OUTPATIENT
Start: 2021-01-01 | End: 2021-01-01 | Stop reason: HOSPADM

## 2021-01-01 RX ORDER — METRONIDAZOLE 500 MG/100ML
500 INJECTION, SOLUTION INTRAVENOUS EVERY 12 HOURS
Status: DISCONTINUED | OUTPATIENT
Start: 2021-01-01 | End: 2021-01-01 | Stop reason: HOSPADM

## 2021-01-01 RX ORDER — SODIUM CHLORIDE, SODIUM LACTATE, POTASSIUM CHLORIDE, CALCIUM CHLORIDE 600; 310; 30; 20 MG/100ML; MG/100ML; MG/100ML; MG/100ML
INJECTION, SOLUTION INTRAVENOUS
Status: DISCONTINUED | OUTPATIENT
Start: 2021-01-01 | End: 2021-01-01 | Stop reason: HOSPADM

## 2021-01-01 RX ORDER — OXYCODONE HYDROCHLORIDE 5 MG/1
5 TABLET ORAL
Status: DISCONTINUED | OUTPATIENT
Start: 2021-01-01 | End: 2021-01-01 | Stop reason: HOSPADM

## 2021-01-01 RX ORDER — CARVEDILOL 12.5 MG/1
TABLET ORAL
Qty: 60 TAB | Refills: 0 | Status: SHIPPED | OUTPATIENT
Start: 2021-01-01 | End: 2021-01-01

## 2021-01-01 RX ORDER — VANCOMYCIN HYDROCHLORIDE 1 G/20ML
INJECTION, POWDER, LYOPHILIZED, FOR SOLUTION INTRAVENOUS AS NEEDED
Status: DISCONTINUED | OUTPATIENT
Start: 2021-01-01 | End: 2021-01-01 | Stop reason: HOSPADM

## 2021-01-01 RX ORDER — FENTANYL CITRATE 50 UG/ML
INJECTION, SOLUTION INTRAMUSCULAR; INTRAVENOUS AS NEEDED
Status: DISCONTINUED | OUTPATIENT
Start: 2021-01-01 | End: 2021-01-01 | Stop reason: HOSPADM

## 2021-01-01 RX ORDER — ONDANSETRON 2 MG/ML
INJECTION INTRAMUSCULAR; INTRAVENOUS AS NEEDED
Status: DISCONTINUED | OUTPATIENT
Start: 2021-01-01 | End: 2021-01-01 | Stop reason: HOSPADM

## 2021-01-01 RX ORDER — SODIUM CHLORIDE 0.9 % (FLUSH) 0.9 %
5-40 SYRINGE (ML) INJECTION EVERY 8 HOURS
Status: DISCONTINUED | OUTPATIENT
Start: 2021-01-01 | End: 2021-01-01 | Stop reason: HOSPADM

## 2021-01-01 RX ORDER — CLOPIDOGREL BISULFATE 75 MG/1
TABLET ORAL
Qty: 30 TAB | Refills: 0 | Status: SHIPPED | OUTPATIENT
Start: 2021-01-01 | End: 2021-01-01

## 2021-01-01 RX ORDER — FUROSEMIDE 40 MG/1
TABLET ORAL
Qty: 30 TABLET | Refills: 0 | Status: SHIPPED | OUTPATIENT
Start: 2021-01-01

## 2021-01-01 RX ORDER — DIPHENHYDRAMINE HCL 25 MG
25 CAPSULE ORAL 2 TIMES DAILY
Status: DISCONTINUED | OUTPATIENT
Start: 2021-01-01 | End: 2021-01-01 | Stop reason: HOSPADM

## 2021-01-01 RX ORDER — ATORVASTATIN CALCIUM 40 MG/1
TABLET, FILM COATED ORAL
Qty: 30 TAB | Refills: 0 | Status: SHIPPED | OUTPATIENT
Start: 2021-01-01 | End: 2021-01-01

## 2021-01-01 RX ORDER — INSULIN LISPRO 100 [IU]/ML
INJECTION, SOLUTION INTRAVENOUS; SUBCUTANEOUS
Status: DISCONTINUED | OUTPATIENT
Start: 2021-01-01 | End: 2021-01-01 | Stop reason: HOSPADM

## 2021-01-01 RX ORDER — BUPIVACAINE HYDROCHLORIDE 5 MG/ML
INJECTION, SOLUTION EPIDURAL; INTRACAUDAL AS NEEDED
Status: DISCONTINUED | OUTPATIENT
Start: 2021-01-01 | End: 2021-01-01 | Stop reason: HOSPADM

## 2021-01-01 RX ORDER — ATORVASTATIN CALCIUM 40 MG/1
40 TABLET, FILM COATED ORAL
Status: DISCONTINUED | OUTPATIENT
Start: 2021-01-01 | End: 2021-01-01

## 2021-01-01 RX ORDER — ASPIRIN 81 MG/1
81 TABLET ORAL DAILY
Status: DISCONTINUED | OUTPATIENT
Start: 2021-01-01 | End: 2021-01-01 | Stop reason: HOSPADM

## 2021-01-01 RX ORDER — HYDROMORPHONE HYDROCHLORIDE 1 MG/ML
0.2 INJECTION, SOLUTION INTRAMUSCULAR; INTRAVENOUS; SUBCUTANEOUS
Status: DISCONTINUED | OUTPATIENT
Start: 2021-01-01 | End: 2021-01-01 | Stop reason: HOSPADM

## 2021-01-01 RX ORDER — FUROSEMIDE 40 MG/1
TABLET ORAL
Qty: 30 TAB | Refills: 0 | Status: SHIPPED | OUTPATIENT
Start: 2021-01-01 | End: 2021-01-01

## 2021-01-01 RX ORDER — LIDOCAINE HYDROCHLORIDE 10 MG/ML
0.1 INJECTION, SOLUTION EPIDURAL; INFILTRATION; INTRACAUDAL; PERINEURAL AS NEEDED
Status: DISCONTINUED | OUTPATIENT
Start: 2021-01-01 | End: 2021-01-01 | Stop reason: HOSPADM

## 2021-01-01 RX ORDER — ATORVASTATIN CALCIUM 40 MG/1
TABLET, FILM COATED ORAL
Qty: 30 TABLET | Refills: 0 | Status: SHIPPED | OUTPATIENT
Start: 2021-01-01

## 2021-01-01 RX ORDER — LOSARTAN POTASSIUM 50 MG/1
TABLET ORAL
Qty: 30 TAB | Refills: 1 | Status: SHIPPED | OUTPATIENT
Start: 2021-01-01

## 2021-01-01 RX ORDER — PROPOFOL 10 MG/ML
INJECTION, EMULSION INTRAVENOUS AS NEEDED
Status: DISCONTINUED | OUTPATIENT
Start: 2021-01-01 | End: 2021-01-01 | Stop reason: HOSPADM

## 2021-01-01 RX ORDER — SODIUM CHLORIDE 0.9 % (FLUSH) 0.9 %
5-40 SYRINGE (ML) INJECTION AS NEEDED
Status: DISCONTINUED | OUTPATIENT
Start: 2021-01-01 | End: 2021-01-01 | Stop reason: HOSPADM

## 2021-01-01 RX ORDER — VANCOMYCIN HYDROCHLORIDE
1250 EVERY 8 HOURS
Status: DISCONTINUED | OUTPATIENT
Start: 2021-01-01 | End: 2021-01-01

## 2021-01-01 RX ORDER — DIPHENHYDRAMINE HYDROCHLORIDE 50 MG/ML
12.5 INJECTION, SOLUTION INTRAMUSCULAR; INTRAVENOUS AS NEEDED
Status: DISCONTINUED | OUTPATIENT
Start: 2021-01-01 | End: 2021-01-01 | Stop reason: HOSPADM

## 2021-01-01 RX ORDER — PROMETHAZINE HYDROCHLORIDE 25 MG/1
12.5 TABLET ORAL
Status: DISCONTINUED | OUTPATIENT
Start: 2021-01-01 | End: 2021-01-01 | Stop reason: HOSPADM

## 2021-01-01 RX ORDER — VANCOMYCIN 1.75 GRAM/500 ML IN 0.9 % SODIUM CHLORIDE INTRAVENOUS
1750 ONCE
Status: COMPLETED | OUTPATIENT
Start: 2021-01-01 | End: 2021-01-01

## 2021-01-01 RX ORDER — DEXTROSE 50 % IN WATER (D50W) INTRAVENOUS SYRINGE
AS NEEDED
Status: DISCONTINUED | OUTPATIENT
Start: 2021-01-01 | End: 2021-01-01 | Stop reason: HOSPADM

## 2021-01-01 RX ORDER — MAGNESIUM SULFATE 100 %
4 CRYSTALS MISCELLANEOUS AS NEEDED
Status: DISCONTINUED | OUTPATIENT
Start: 2021-01-01 | End: 2021-01-01 | Stop reason: HOSPADM

## 2021-01-01 RX ORDER — ONDANSETRON 2 MG/ML
4 INJECTION INTRAMUSCULAR; INTRAVENOUS
Status: DISCONTINUED | OUTPATIENT
Start: 2021-01-01 | End: 2021-01-01 | Stop reason: HOSPADM

## 2021-01-01 RX ORDER — CLOPIDOGREL BISULFATE 75 MG/1
TABLET ORAL
Qty: 30 TABLET | Refills: 0 | Status: SHIPPED | OUTPATIENT
Start: 2021-01-01

## 2021-01-01 RX ORDER — CLINDAMYCIN PHOSPHATE 600 MG/50ML
600 INJECTION INTRAVENOUS EVERY 8 HOURS
Status: DISCONTINUED | OUTPATIENT
Start: 2021-01-01 | End: 2021-01-01

## 2021-01-01 RX ORDER — CARVEDILOL 12.5 MG/1
TABLET ORAL
Qty: 60 TABLET | Refills: 0 | Status: SHIPPED | OUTPATIENT
Start: 2021-01-01

## 2021-01-01 RX ORDER — FENTANYL CITRATE 50 UG/ML
25 INJECTION, SOLUTION INTRAMUSCULAR; INTRAVENOUS
Status: DISCONTINUED | OUTPATIENT
Start: 2021-01-01 | End: 2021-01-01 | Stop reason: HOSPADM

## 2021-01-01 RX ORDER — FUROSEMIDE 20 MG/1
20 TABLET ORAL DAILY
Status: DISCONTINUED | OUTPATIENT
Start: 2021-01-01 | End: 2021-01-01 | Stop reason: HOSPADM

## 2021-01-01 RX ORDER — CARVEDILOL 12.5 MG/1
12.5 TABLET ORAL 2 TIMES DAILY WITH MEALS
Status: DISCONTINUED | OUTPATIENT
Start: 2021-01-01 | End: 2021-01-01 | Stop reason: HOSPADM

## 2021-01-01 RX ORDER — ACETAMINOPHEN 325 MG/1
650 TABLET ORAL
Status: DISCONTINUED | OUTPATIENT
Start: 2021-01-01 | End: 2021-01-01 | Stop reason: HOSPADM

## 2021-01-01 RX ORDER — DOXYCYCLINE 100 MG/1
100 CAPSULE ORAL 2 TIMES DAILY
Qty: 84 CAP | Refills: 0 | Status: SHIPPED | OUTPATIENT
Start: 2021-01-01 | End: 2021-01-01

## 2021-01-01 RX ORDER — POLYETHYLENE GLYCOL 3350 17 G/17G
17 POWDER, FOR SOLUTION ORAL DAILY PRN
Status: DISCONTINUED | OUTPATIENT
Start: 2021-01-01 | End: 2021-01-01 | Stop reason: HOSPADM

## 2021-01-01 RX ORDER — LIDOCAINE HYDROCHLORIDE 20 MG/ML
INJECTION, SOLUTION EPIDURAL; INFILTRATION; INTRACAUDAL; PERINEURAL AS NEEDED
Status: DISCONTINUED | OUTPATIENT
Start: 2021-01-01 | End: 2021-01-01 | Stop reason: HOSPADM

## 2021-01-01 RX ORDER — MIDAZOLAM HYDROCHLORIDE 1 MG/ML
INJECTION, SOLUTION INTRAMUSCULAR; INTRAVENOUS AS NEEDED
Status: DISCONTINUED | OUTPATIENT
Start: 2021-01-01 | End: 2021-01-01 | Stop reason: HOSPADM

## 2021-01-01 RX ORDER — METHYLPREDNISOLONE 4 MG/1
TABLET ORAL
Qty: 1 DOSE PACK | Refills: 0 | Status: SHIPPED | OUTPATIENT
Start: 2021-01-01

## 2021-01-01 RX ORDER — ACETAMINOPHEN 650 MG/1
650 SUPPOSITORY RECTAL
Status: DISCONTINUED | OUTPATIENT
Start: 2021-01-01 | End: 2021-01-01 | Stop reason: HOSPADM

## 2021-01-01 RX ORDER — SODIUM CHLORIDE, SODIUM LACTATE, POTASSIUM CHLORIDE, CALCIUM CHLORIDE 600; 310; 30; 20 MG/100ML; MG/100ML; MG/100ML; MG/100ML
25 INJECTION, SOLUTION INTRAVENOUS CONTINUOUS
Status: DISCONTINUED | OUTPATIENT
Start: 2021-01-01 | End: 2021-01-01 | Stop reason: HOSPADM

## 2021-01-01 RX ORDER — LORATADINE 10 MG/1
10 TABLET ORAL DAILY
Status: DISCONTINUED | OUTPATIENT
Start: 2021-01-01 | End: 2021-01-01 | Stop reason: HOSPADM

## 2021-01-01 RX ORDER — DEXTROSE 50 % IN WATER (D50W) INTRAVENOUS SYRINGE
25-50 AS NEEDED
Status: DISCONTINUED | OUTPATIENT
Start: 2021-01-01 | End: 2021-01-01 | Stop reason: HOSPADM

## 2021-01-01 RX ADMIN — INSULIN LISPRO 1 UNITS: 100 INJECTION, SOLUTION INTRAVENOUS; SUBCUTANEOUS at 22:54

## 2021-01-01 RX ADMIN — INSULIN LISPRO 2 UNITS: 100 INJECTION, SOLUTION INTRAVENOUS; SUBCUTANEOUS at 11:19

## 2021-01-01 RX ADMIN — CLINDAMYCIN PHOSPHATE 600 MG: 600 INJECTION, SOLUTION INTRAVENOUS at 12:47

## 2021-01-01 RX ADMIN — CLINDAMYCIN PHOSPHATE 600 MG: 600 INJECTION, SOLUTION INTRAVENOUS at 22:36

## 2021-01-01 RX ADMIN — INSULIN LISPRO 2 UNITS: 100 INJECTION, SOLUTION INTRAVENOUS; SUBCUTANEOUS at 07:59

## 2021-01-01 RX ADMIN — Medication 10 ML: at 09:04

## 2021-01-01 RX ADMIN — SODIUM CHLORIDE 10 ML: 9 INJECTION, SOLUTION INTRAMUSCULAR; INTRAVENOUS; SUBCUTANEOUS at 13:20

## 2021-01-01 RX ADMIN — FENTANYL CITRATE 50 MCG: 50 INJECTION, SOLUTION INTRAMUSCULAR; INTRAVENOUS at 17:49

## 2021-01-01 RX ADMIN — CEFEPIME HYDROCHLORIDE 1 G: 1 INJECTION, POWDER, FOR SOLUTION INTRAMUSCULAR; INTRAVENOUS at 20:12

## 2021-01-01 RX ADMIN — CARVEDILOL 12.5 MG: 12.5 TABLET, FILM COATED ORAL at 17:21

## 2021-01-01 RX ADMIN — CARVEDILOL 12.5 MG: 12.5 TABLET, FILM COATED ORAL at 08:14

## 2021-01-01 RX ADMIN — CEFEPIME HYDROCHLORIDE 1 G: 1 INJECTION, POWDER, FOR SOLUTION INTRAMUSCULAR; INTRAVENOUS at 11:09

## 2021-01-01 RX ADMIN — INSULIN LISPRO 2 UNITS: 100 INJECTION, SOLUTION INTRAVENOUS; SUBCUTANEOUS at 22:25

## 2021-01-01 RX ADMIN — SODIUM CHLORIDE 10 ML: 9 INJECTION, SOLUTION INTRAMUSCULAR; INTRAVENOUS; SUBCUTANEOUS at 06:44

## 2021-01-01 RX ADMIN — CLINDAMYCIN PHOSPHATE 600 MG: 600 INJECTION, SOLUTION INTRAVENOUS at 20:04

## 2021-01-01 RX ADMIN — FUROSEMIDE 20 MG: 20 TABLET ORAL at 10:08

## 2021-01-01 RX ADMIN — ATORVASTATIN CALCIUM 40 MG: 40 TABLET, FILM COATED ORAL at 21:42

## 2021-01-01 RX ADMIN — Medication 10 ML: at 08:37

## 2021-01-01 RX ADMIN — ASPIRIN 81 MG: 81 TABLET, COATED ORAL at 09:06

## 2021-01-01 RX ADMIN — SODIUM CHLORIDE, POTASSIUM CHLORIDE, SODIUM LACTATE AND CALCIUM CHLORIDE: 600; 310; 30; 20 INJECTION, SOLUTION INTRAVENOUS at 17:46

## 2021-01-01 RX ADMIN — SODIUM CHLORIDE 10 ML: 9 INJECTION, SOLUTION INTRAMUSCULAR; INTRAVENOUS; SUBCUTANEOUS at 05:17

## 2021-01-01 RX ADMIN — SODIUM CHLORIDE 10 ML: 9 INJECTION, SOLUTION INTRAMUSCULAR; INTRAVENOUS; SUBCUTANEOUS at 06:18

## 2021-01-01 RX ADMIN — METHYLPREDNISOLONE SODIUM SUCCINATE 40 MG: 40 INJECTION, POWDER, FOR SOLUTION INTRAMUSCULAR; INTRAVENOUS at 09:04

## 2021-01-01 RX ADMIN — CEFEPIME HYDROCHLORIDE 1 G: 1 INJECTION, POWDER, FOR SOLUTION INTRAMUSCULAR; INTRAVENOUS at 04:24

## 2021-01-01 RX ADMIN — METHYLPREDNISOLONE SODIUM SUCCINATE 40 MG: 40 INJECTION, POWDER, FOR SOLUTION INTRAMUSCULAR; INTRAVENOUS at 17:22

## 2021-01-01 RX ADMIN — SODIUM CHLORIDE 10 ML: 9 INJECTION, SOLUTION INTRAMUSCULAR; INTRAVENOUS; SUBCUTANEOUS at 14:11

## 2021-01-01 RX ADMIN — FUROSEMIDE 20 MG: 20 TABLET ORAL at 09:04

## 2021-01-01 RX ADMIN — INSULIN LISPRO 2 UNITS: 100 INJECTION, SOLUTION INTRAVENOUS; SUBCUTANEOUS at 11:51

## 2021-01-01 RX ADMIN — METRONIDAZOLE 500 MG: 500 INJECTION, SOLUTION INTRAVENOUS at 13:20

## 2021-01-01 RX ADMIN — VANCOMYCIN HYDROCHLORIDE 1250 MG: 10 INJECTION, POWDER, LYOPHILIZED, FOR SOLUTION INTRAVENOUS at 18:52

## 2021-01-01 RX ADMIN — ASPIRIN 81 MG: 81 TABLET, COATED ORAL at 08:14

## 2021-01-01 RX ADMIN — METRONIDAZOLE 500 MG: 500 INJECTION, SOLUTION INTRAVENOUS at 02:03

## 2021-01-01 RX ADMIN — DAPTOMYCIN 600 MG: 500 INJECTION, POWDER, LYOPHILIZED, FOR SOLUTION INTRAVENOUS at 21:23

## 2021-01-01 RX ADMIN — CARVEDILOL 12.5 MG: 12.5 TABLET, FILM COATED ORAL at 09:04

## 2021-01-01 RX ADMIN — PROPOFOL 50 MG: 10 INJECTION, EMULSION INTRAVENOUS at 17:53

## 2021-01-01 RX ADMIN — SODIUM CHLORIDE 10 ML: 9 INJECTION, SOLUTION INTRAMUSCULAR; INTRAVENOUS; SUBCUTANEOUS at 21:42

## 2021-01-01 RX ADMIN — LORATADINE 10 MG: 10 TABLET ORAL at 08:36

## 2021-01-01 RX ADMIN — DIPHENHYDRAMINE HYDROCHLORIDE 25 MG: 25 CAPSULE ORAL at 17:21

## 2021-01-01 RX ADMIN — PROPOFOL 50 MG: 10 INJECTION, EMULSION INTRAVENOUS at 17:55

## 2021-01-01 RX ADMIN — DIPHENHYDRAMINE HYDROCHLORIDE 25 MG: 25 CAPSULE ORAL at 17:09

## 2021-01-01 RX ADMIN — CLINDAMYCIN PHOSPHATE 600 MG: 600 INJECTION, SOLUTION INTRAVENOUS at 04:31

## 2021-01-01 RX ADMIN — DAPTOMYCIN 600 MG: 500 INJECTION, POWDER, LYOPHILIZED, FOR SOLUTION INTRAVENOUS at 22:54

## 2021-01-01 RX ADMIN — SODIUM CHLORIDE 10 ML: 9 INJECTION, SOLUTION INTRAMUSCULAR; INTRAVENOUS; SUBCUTANEOUS at 14:19

## 2021-01-01 RX ADMIN — INSULIN LISPRO 2 UNITS: 100 INJECTION, SOLUTION INTRAVENOUS; SUBCUTANEOUS at 11:49

## 2021-01-01 RX ADMIN — CEFEPIME HYDROCHLORIDE 1 G: 1 INJECTION, POWDER, FOR SOLUTION INTRAMUSCULAR; INTRAVENOUS at 03:42

## 2021-01-01 RX ADMIN — ATORVASTATIN CALCIUM 40 MG: 40 TABLET, FILM COATED ORAL at 01:42

## 2021-01-01 RX ADMIN — FUROSEMIDE 20 MG: 20 TABLET ORAL at 10:49

## 2021-01-01 RX ADMIN — ATORVASTATIN CALCIUM 40 MG: 40 TABLET, FILM COATED ORAL at 22:24

## 2021-01-01 RX ADMIN — INSULIN LISPRO 3 UNITS: 100 INJECTION, SOLUTION INTRAVENOUS; SUBCUTANEOUS at 17:09

## 2021-01-01 RX ADMIN — VANCOMYCIN HYDROCHLORIDE 1250 MG: 10 INJECTION, POWDER, LYOPHILIZED, FOR SOLUTION INTRAVENOUS at 10:08

## 2021-01-01 RX ADMIN — PROPOFOL 60 MCG/KG/MIN: 10 INJECTION, EMULSION INTRAVENOUS at 17:53

## 2021-01-01 RX ADMIN — SODIUM CHLORIDE 10 ML: 9 INJECTION, SOLUTION INTRAMUSCULAR; INTRAVENOUS; SUBCUTANEOUS at 13:15

## 2021-01-01 RX ADMIN — SODIUM CHLORIDE 600 MG: 9 INJECTION, SOLUTION INTRAVENOUS at 19:19

## 2021-01-01 RX ADMIN — FUROSEMIDE 20 MG: 20 TABLET ORAL at 08:36

## 2021-01-01 RX ADMIN — CEFEPIME HYDROCHLORIDE 1 G: 1 INJECTION, POWDER, FOR SOLUTION INTRAMUSCULAR; INTRAVENOUS at 05:17

## 2021-01-01 RX ADMIN — ASPIRIN 81 MG: 81 TABLET, COATED ORAL at 10:49

## 2021-01-01 RX ADMIN — SODIUM CHLORIDE 10 ML: 9 INJECTION, SOLUTION INTRAMUSCULAR; INTRAVENOUS; SUBCUTANEOUS at 21:23

## 2021-01-01 RX ADMIN — LIDOCAINE HYDROCHLORIDE 40 MG: 20 INJECTION, SOLUTION EPIDURAL; INFILTRATION; INTRACAUDAL; PERINEURAL at 17:53

## 2021-01-01 RX ADMIN — DEXTROSE MONOHYDRATE 12.5 G: 25 INJECTION, SOLUTION INTRAVENOUS at 17:07

## 2021-01-01 RX ADMIN — CEFEPIME HYDROCHLORIDE 1 G: 1 INJECTION, POWDER, FOR SOLUTION INTRAMUSCULAR; INTRAVENOUS at 14:55

## 2021-01-01 RX ADMIN — METRONIDAZOLE 500 MG: 500 INJECTION, SOLUTION INTRAVENOUS at 13:12

## 2021-01-01 RX ADMIN — METRONIDAZOLE 500 MG: 500 INJECTION, SOLUTION INTRAVENOUS at 00:16

## 2021-01-01 RX ADMIN — CEFEPIME HYDROCHLORIDE 1 G: 1 INJECTION, POWDER, FOR SOLUTION INTRAMUSCULAR; INTRAVENOUS at 21:36

## 2021-01-01 RX ADMIN — CARVEDILOL 12.5 MG: 12.5 TABLET, FILM COATED ORAL at 09:06

## 2021-01-01 RX ADMIN — SODIUM CHLORIDE 10 ML: 9 INJECTION, SOLUTION INTRAMUSCULAR; INTRAVENOUS; SUBCUTANEOUS at 21:37

## 2021-01-01 RX ADMIN — ASPIRIN 81 MG: 81 TABLET, COATED ORAL at 09:04

## 2021-01-01 RX ADMIN — VANCOMYCIN HYDROCHLORIDE 1000 MG: 1 INJECTION, POWDER, LYOPHILIZED, FOR SOLUTION INTRAVENOUS at 17:26

## 2021-01-01 RX ADMIN — METRONIDAZOLE 500 MG: 500 INJECTION, SOLUTION INTRAVENOUS at 13:58

## 2021-01-01 RX ADMIN — CARVEDILOL 12.5 MG: 12.5 TABLET, FILM COATED ORAL at 10:48

## 2021-01-01 RX ADMIN — SODIUM CHLORIDE 10 ML: 9 INJECTION, SOLUTION INTRAMUSCULAR; INTRAVENOUS; SUBCUTANEOUS at 14:00

## 2021-01-01 RX ADMIN — DIPHENHYDRAMINE HYDROCHLORIDE 25 MG: 25 CAPSULE ORAL at 10:48

## 2021-01-01 RX ADMIN — PIPERACILLIN AND TAZOBACTAM 3.38 G: 3; .375 INJECTION, POWDER, LYOPHILIZED, FOR SOLUTION INTRAVENOUS at 17:13

## 2021-01-01 RX ADMIN — CARVEDILOL 12.5 MG: 12.5 TABLET, FILM COATED ORAL at 17:31

## 2021-01-01 RX ADMIN — CARVEDILOL 12.5 MG: 12.5 TABLET, FILM COATED ORAL at 17:09

## 2021-01-01 RX ADMIN — METHYLPREDNISOLONE SODIUM SUCCINATE 125 MG: 125 INJECTION, POWDER, FOR SOLUTION INTRAMUSCULAR; INTRAVENOUS at 10:48

## 2021-01-01 RX ADMIN — CLINDAMYCIN PHOSPHATE 600 MG: 600 INJECTION, SOLUTION INTRAVENOUS at 11:50

## 2021-01-01 RX ADMIN — ATORVASTATIN CALCIUM 40 MG: 40 TABLET, FILM COATED ORAL at 23:10

## 2021-01-01 RX ADMIN — SODIUM CHLORIDE 10 ML: 9 INJECTION, SOLUTION INTRAMUSCULAR; INTRAVENOUS; SUBCUTANEOUS at 05:18

## 2021-01-01 RX ADMIN — VANCOMYCIN HYDROCHLORIDE 1250 MG: 10 INJECTION, POWDER, LYOPHILIZED, FOR SOLUTION INTRAVENOUS at 23:53

## 2021-01-01 RX ADMIN — ATORVASTATIN CALCIUM 40 MG: 40 TABLET, FILM COATED ORAL at 21:06

## 2021-01-01 RX ADMIN — LORATADINE 10 MG: 10 TABLET ORAL at 10:48

## 2021-01-01 RX ADMIN — VANCOMYCIN HYDROCHLORIDE 1000 MG: 1 INJECTION, POWDER, LYOPHILIZED, FOR SOLUTION INTRAVENOUS at 04:50

## 2021-01-01 RX ADMIN — SODIUM CHLORIDE 10 ML: 9 INJECTION, SOLUTION INTRAMUSCULAR; INTRAVENOUS; SUBCUTANEOUS at 14:14

## 2021-01-01 RX ADMIN — CEFEPIME HYDROCHLORIDE 1 G: 1 INJECTION, POWDER, FOR SOLUTION INTRAMUSCULAR; INTRAVENOUS at 16:35

## 2021-01-01 RX ADMIN — CARVEDILOL 12.5 MG: 12.5 TABLET, FILM COATED ORAL at 10:08

## 2021-01-01 RX ADMIN — SODIUM CHLORIDE 10 ML: 9 INJECTION, SOLUTION INTRAMUSCULAR; INTRAVENOUS; SUBCUTANEOUS at 22:24

## 2021-01-01 RX ADMIN — CLINDAMYCIN PHOSPHATE 600 MG: 600 INJECTION, SOLUTION INTRAVENOUS at 11:46

## 2021-01-01 RX ADMIN — METHYLPREDNISOLONE SODIUM SUCCINATE 40 MG: 40 INJECTION, POWDER, FOR SOLUTION INTRAMUSCULAR; INTRAVENOUS at 22:54

## 2021-01-01 RX ADMIN — VANCOMYCIN HYDROCHLORIDE 1500 MG: 750 INJECTION, POWDER, LYOPHILIZED, FOR SOLUTION INTRAVENOUS at 17:48

## 2021-01-01 RX ADMIN — SODIUM CHLORIDE 10 ML: 9 INJECTION, SOLUTION INTRAMUSCULAR; INTRAVENOUS; SUBCUTANEOUS at 21:08

## 2021-01-01 RX ADMIN — ASPIRIN 81 MG: 81 TABLET, COATED ORAL at 08:36

## 2021-01-01 RX ADMIN — METRONIDAZOLE 500 MG: 500 INJECTION, SOLUTION INTRAVENOUS at 13:01

## 2021-01-01 RX ADMIN — VANCOMYCIN HYDROCHLORIDE 1250 MG: 10 INJECTION, POWDER, LYOPHILIZED, FOR SOLUTION INTRAVENOUS at 01:09

## 2021-01-01 RX ADMIN — FUROSEMIDE 20 MG: 20 TABLET ORAL at 09:06

## 2021-01-01 RX ADMIN — SODIUM CHLORIDE 10 ML: 9 INJECTION, SOLUTION INTRAMUSCULAR; INTRAVENOUS; SUBCUTANEOUS at 22:54

## 2021-01-01 RX ADMIN — ONDANSETRON HYDROCHLORIDE 4 MG: 2 INJECTION, SOLUTION INTRAMUSCULAR; INTRAVENOUS at 18:25

## 2021-01-01 RX ADMIN — LORATADINE 10 MG: 10 TABLET ORAL at 09:04

## 2021-01-01 RX ADMIN — SODIUM CHLORIDE 10 ML: 9 INJECTION, SOLUTION INTRAMUSCULAR; INTRAVENOUS; SUBCUTANEOUS at 17:21

## 2021-01-01 RX ADMIN — CLINDAMYCIN PHOSPHATE 600 MG: 600 INJECTION, SOLUTION INTRAVENOUS at 03:42

## 2021-01-01 RX ADMIN — CEFEPIME HYDROCHLORIDE 1 G: 1 INJECTION, POWDER, FOR SOLUTION INTRAMUSCULAR; INTRAVENOUS at 05:41

## 2021-01-01 RX ADMIN — VANCOMYCIN HYDROCHLORIDE 1000 MG: 1 INJECTION, POWDER, LYOPHILIZED, FOR SOLUTION INTRAVENOUS at 23:29

## 2021-01-01 RX ADMIN — CEFEPIME HYDROCHLORIDE 1 G: 1 INJECTION, POWDER, FOR SOLUTION INTRAMUSCULAR; INTRAVENOUS at 05:53

## 2021-01-01 RX ADMIN — ASPIRIN 81 MG: 81 TABLET, COATED ORAL at 10:08

## 2021-01-01 RX ADMIN — CEFEPIME HYDROCHLORIDE 1 G: 1 INJECTION, POWDER, FOR SOLUTION INTRAMUSCULAR; INTRAVENOUS at 12:03

## 2021-01-01 RX ADMIN — METHYLPREDNISOLONE SODIUM SUCCINATE 40 MG: 40 INJECTION, POWDER, FOR SOLUTION INTRAMUSCULAR; INTRAVENOUS at 08:36

## 2021-01-01 RX ADMIN — SODIUM CHLORIDE 10 ML: 9 INJECTION, SOLUTION INTRAMUSCULAR; INTRAVENOUS; SUBCUTANEOUS at 05:10

## 2021-01-01 RX ADMIN — VANCOMYCIN HYDROCHLORIDE 1750 MG: 10 INJECTION, POWDER, LYOPHILIZED, FOR SOLUTION INTRAVENOUS at 14:12

## 2021-01-01 RX ADMIN — METRONIDAZOLE 500 MG: 500 INJECTION, SOLUTION INTRAVENOUS at 01:47

## 2021-01-01 RX ADMIN — CLINDAMYCIN PHOSPHATE 600 MG: 600 INJECTION, SOLUTION INTRAVENOUS at 06:50

## 2021-01-01 RX ADMIN — CEFEPIME HYDROCHLORIDE 1 G: 1 INJECTION, POWDER, FOR SOLUTION INTRAMUSCULAR; INTRAVENOUS at 15:02

## 2021-01-01 RX ADMIN — CEFEPIME HYDROCHLORIDE 1 G: 1 INJECTION, POWDER, FOR SOLUTION INTRAMUSCULAR; INTRAVENOUS at 11:50

## 2021-01-01 RX ADMIN — CEFEPIME HYDROCHLORIDE 1 G: 1 INJECTION, POWDER, FOR SOLUTION INTRAMUSCULAR; INTRAVENOUS at 21:42

## 2021-01-01 RX ADMIN — CEFEPIME HYDROCHLORIDE 1 G: 1 INJECTION, POWDER, FOR SOLUTION INTRAMUSCULAR; INTRAVENOUS at 04:04

## 2021-01-01 RX ADMIN — MIDAZOLAM HYDROCHLORIDE 1 MG: 1 INJECTION, SOLUTION INTRAMUSCULAR; INTRAVENOUS at 18:14

## 2021-01-01 RX ADMIN — DIPHENHYDRAMINE HYDROCHLORIDE 25 MG: 25 CAPSULE ORAL at 08:36

## 2021-01-01 RX ADMIN — ATORVASTATIN CALCIUM 40 MG: 40 TABLET, FILM COATED ORAL at 21:36

## 2021-01-01 RX ADMIN — CEFEPIME HYDROCHLORIDE 1 G: 1 INJECTION, POWDER, FOR SOLUTION INTRAMUSCULAR; INTRAVENOUS at 23:10

## 2021-01-01 RX ADMIN — CEFEPIME HYDROCHLORIDE 1 G: 1 INJECTION, POWDER, FOR SOLUTION INTRAMUSCULAR; INTRAVENOUS at 21:06

## 2021-01-01 RX ADMIN — SODIUM CHLORIDE 10 ML: 9 INJECTION, SOLUTION INTRAMUSCULAR; INTRAVENOUS; SUBCUTANEOUS at 05:41

## 2021-01-01 RX ADMIN — CARVEDILOL 12.5 MG: 12.5 TABLET, FILM COATED ORAL at 18:52

## 2021-01-01 RX ADMIN — MIDAZOLAM HYDROCHLORIDE 2 MG: 1 INJECTION, SOLUTION INTRAMUSCULAR; INTRAVENOUS at 17:45

## 2021-01-01 RX ADMIN — DIPHENHYDRAMINE HYDROCHLORIDE 25 MG: 25 CAPSULE ORAL at 09:04

## 2021-01-01 RX ADMIN — METRONIDAZOLE 500 MG: 500 INJECTION, SOLUTION INTRAVENOUS at 12:45

## 2021-01-01 RX ADMIN — SODIUM CHLORIDE 10 ML: 9 INJECTION, SOLUTION INTRAMUSCULAR; INTRAVENOUS; SUBCUTANEOUS at 23:11

## 2021-01-01 RX ADMIN — METRONIDAZOLE 500 MG: 500 INJECTION, SOLUTION INTRAVENOUS at 13:15

## 2021-01-01 RX ADMIN — METRONIDAZOLE 500 MG: 500 INJECTION, SOLUTION INTRAVENOUS at 03:27

## 2021-01-01 RX ADMIN — CARVEDILOL 12.5 MG: 12.5 TABLET, FILM COATED ORAL at 17:25

## 2021-01-01 RX ADMIN — METRONIDAZOLE 500 MG: 500 INJECTION, SOLUTION INTRAVENOUS at 01:11

## 2021-01-01 RX ADMIN — CARVEDILOL 12.5 MG: 12.5 TABLET, FILM COATED ORAL at 08:36

## 2021-01-01 RX ADMIN — DEXTROSE MONOHYDRATE 25 G: 25 INJECTION, SOLUTION INTRAVENOUS at 12:58

## 2021-05-04 PROBLEM — L08.9 DIABETIC FOOT INFECTION (HCC): Status: ACTIVE | Noted: 2021-01-01

## 2021-05-04 PROBLEM — E11.628 DIABETIC FOOT INFECTION (HCC): Status: ACTIVE | Noted: 2021-01-01

## 2021-05-04 NOTE — ED PROVIDER NOTES
EMERGENCY DEPARTMENT HISTORY AND PHYSICAL EXAM 
     
 
Date: 2021 Patient Name: Sheryle Netters History of Presenting Illness Chief Complaint Patient presents with  Foot Ulcer History Provided By: Patient HPI: Sheryle Netters is a 36 y.o. male, pmhx listed below, who presents to the ED c/o foot infection. Patient reports he has a history of an ulcer on his right foot. States approximately 9 years ago, he had a similar infection and ended up losing a toe on his right foot. Reports he noted the ulceration first as a callus several days ago, then it became soft and opened up. Noted toes were getting red and swollen. No fever. Denies injury to foot. Reports he has a history of diet-controlled diabetes. Patient quit smoking a year and a half ago. PCP: None There are no other complaints, changes, or physical findings at this time. Past History Past Medical History: 
Past Medical History:  
Diagnosis Date  Diabetes ([facility] [address] Congestive heart failure, EF 20% Past Surgical History: 
Past Surgical History:  
Procedure Laterality Date  HX AMPUTATION  10/02/2012 Right 3rd toe Family History: 
Family History Problem Relation Age of Onset  Heart Disease Maternal Grandmother  Cancer Paternal Grandmother   
     Kaykay Salazar  Stroke Paternal Grandfather Social History: 
Social History Tobacco Use  Smoking status: Former Smoker Packs/day: 1.00 Years: 21.00 Pack years: 21.00 Types: Cigarettes Quit date: 2019 Years since quittin.4  Smokeless tobacco: Never Used Substance Use Topics  Alcohol use: No  
 Drug use: Never Current Facility-Administered Medications Medication Dose Route Frequency Provider Last Rate Last Admin  vancomycin (VANCOCIN) 1,000 mg in 0.9% sodium chloride 250 mL (VIAL-MATE)  1,000 mg IntraVENous Q8H Marybel Castellon DO Facility-Administered Medications Ordered in Other Encounters Medication Dose Route Frequency Provider Last Rate Last Admin  aspirin delayed-release tablet 81 mg  81 mg Oral DAILY Stalin Suarez MD      
 atorvastatin (LIPITOR) tablet 40 mg  40 mg Oral QHS Luba Crane MD   40 mg at 05/05/21 6494  carvediloL (COREG) tablet 12.5 mg  12.5 mg Oral BID WITH MEALS Stalin Suarez MD      
 sodium chloride (NS) flush 5-40 mL  5-40 mL IntraVENous Q8H Stalin Suarez MD   Stopped at 05/05/21 0600  
 sodium chloride (NS) flush 5-40 mL  5-40 mL IntraVENous PRN Stalin Suarez MD      
 acetaminophen (TYLENOL) tablet 650 mg  650 mg Oral Q6H PRN Stalin Suarez MD      
 Or  
[facility] acetaminophen (TYLENOL) suppository 650 mg  650 mg Rectal Q6H PRN Stalin Suarez MD      
 polyethylene glycol (MIRALAX) packet 17 g  17 g Oral DAILY PRN Stalin Suarez MD      
 promethazine (PHENERGAN) tablet 12.5 mg  12.5 mg Oral Q6H PRN Stalin Suarez MD      
 Or  
 ondansetron [facility]) injection 4 mg  4 mg IntraVENous Q6H PRN Stalin Suarez MD      
 cefepime (MAXIPIME) 1 g in 0.9% sodium chloride (MBP/ADV) 50 mL MBP  1 g IntraVENous Q8H Stalin Suarez  mL/hr at 05/05/21 0404 1 g at 05/05/21 0404  clindamycin (CLEOCIN) 600mg D5W 50mL IVPB (premix)  600 mg IntraVENous Q8H Stalin Suarez  mL/hr at 05/05/21 0650 600 mg at 05/05/21 0650  
 glucose chewable tablet 16 g  4 Tab Oral PRN Stalin Suarez MD      
 dextrose (D50W) injection syrg 12.5-25 g  25-50 mL IntraVENous PRN Stalin Suarez MD      
 glucagon (GLUCAGEN) injection 1 mg  1 mg IntraMUSCular PRN Stalin Suarez MD      
 insulin lispro (HUMALOG) injection   SubCUTAneous AC&HS Stalin Suarez MD      
 
 
Allergies: Allergies Allergen Reactions  Eliquis [Apixaban] Hives Review of Systems Review of Systems Constitutional: Negative for chills and fever. HENT: Negative for ear pain. Eyes: Negative for pain. Respiratory: Negative for shortness of breath. Cardiovascular: Negative for chest pain. Gastrointestinal: Negative for abdominal pain. Genitourinary: Negative for flank pain. Musculoskeletal: Negative for back pain. Skin: Positive for wound. Negative for rash. Neurological: Negative for headaches. Psychiatric/Behavioral: Negative for agitation. Physical Exam  
 
Vital Signs-Reviewed the patient's vital signs. Patient Vitals for the past 12 hrs: 
 Temp Pulse Resp BP SpO2  
05/04/21 2055 98.7 °F (37.1 °C) 60 18 (!) 141/89 99 % Physical Exam 
Vitals signs reviewed. HENT:  
   Head: Normocephalic and atraumatic. Mouth/Throat:  
   Mouth: Mucous membranes are moist.  
Neck: Musculoskeletal: Normal range of motion. Cardiovascular:  
   Rate and Rhythm: Normal rate and regular rhythm. Pulmonary:  
   Effort: Pulmonary effort is normal.  
   Breath sounds: Normal breath sounds. Abdominal:  
   Palpations: Abdomen is soft. Tenderness: There is no abdominal tenderness. Musculoskeletal:  
   Comments: Normal DP pulse. Right foot with 4 digits. Two ulcerations on plantar surface: ulceration to lateral aspect of foot with some purulent drainage and surrounding erythema, ulceration to medial aspect of foot superficial with no surrounding erythema. Deeply erythematous and edematous lateral toes. Skin: 
   General: Skin is warm and dry. Neurological:  
   Mental Status: He is alert and oriented to person, place, and time. Psychiatric:     
   Mood and Affect: Mood normal.  
 
 
 
Diagnostic Study Results Labs - No results found for this or any previous visit (from the past 12 hour(s)). Radiologic Studies -  
XR FOOT RT MIN 3 V Final Result Significant progression of erosion of the first MTP joint. Charcot  
joint is slightly favored over septic arthritis and osteomyelitis.  An ulcer over  
the first MTP joint would make septic arthritis more likely. CT Results  (Last 48 hours) None CXR Results  (Last 48 hours) None Medical Decision Making I am the first provider for this patient. I reviewed the vital signs, available nursing notes, past medical history, past surgical history, family history and social history. Records Reviewed: Nursing Notes and Old Medical Records Provider Notes (Medical Decision Making): MDM: 44-year-old male with history of ulceration and foot infection leading to amputation of toe. Now with ulceration and infected toes again. We will plan for lab work and IV antibiotics. Likely admission. Initial assessment performed. The patients presenting problems have been discussed, and they are in agreement with the care plan formulated and outlined with them. I have encouraged them to ask questions as they arise throughout their visit. PROGRESS NOTE: 
ED Course as of May 05 0831 Tue May 04, 2021  
1715 Case discussed with Dr. Sammie Rice, General Surgery. Patient will need to be transferred for management. [PV] 1733 Transfer initiated. I spoke with Ross Barcenas RN. [employer]. [PV] 1812 Case discussed with Dr. Concetta Sampson for transfer to THE [facility] ED. [PV] ED Course User Index [PV] Maria Esther Thomas MD  
  
 
 
 
Critical Care Time: CRITICAL CARE NOTE : 
 
6:21 PM 
 
 
IMPENDING DETERIORATION - osteomyelitis (limb threatening infection) ASSOCIATED RISK FACTORS - Vascular Compromise MANAGEMENT- Transfer INTERPRETATION -  Xrays INTERVENTIONS - IV antibiotics TREATMENT RESPONSE -Stable PERFORMED BY - Self NOTES   : 
 
 
I have spent 45 minutes of critical care time involved in lab review, consultations with specialist, family decision- making, bedside attention and documentation. During this entire length of time I was immediately available to the patient . Elida Arzola MD 
 
 
 
 Diagnosis Clinical Impression: 1. Osteomyelitis of right foot, unspecified type ([facility] [address] Disposition: 
Transferred to Another Facility Discharge Medication List as of 5/4/2021  9:11 PM  
  
 
 
 
Please note, this dictation was completed with [employer], the computer voice recognition software. Quite often unanticipated grammatical, syntax, homophones, and other interpretive errors are inadvertently transcribed by the computer software. Please disregard these errors. Please excuse any errors that have escaped final proof reading.

## 2021-05-04 NOTE — ED NOTES
TRANSFER - OUT REPORT:    Verbal report given to [facility] RN(name) on Orpha Founds  being transferred to [facility] ED(unit) for routine progression of care       Report consisted of patients Situation, Background, Assessment and   Recommendations(SBAR). Information from the following report(s) SBAR, Kardex and ED Summary was reviewed with the receiving nurse. Lines:   Peripheral IV 05/04/21 Left Forearm (Active)        Opportunity for questions and clarification was provided. Patient transported with:   Iv fluids

## 2021-05-04 NOTE — ED NOTES
Bedside shift change report given to [address] (oncoming nurse) by Lety Noel (offgoing nurse). Report included the following information SBAR and Kardex.

## 2021-05-04 NOTE — PROGRESS NOTES
Pharmacy Antimicrobial Dosing  37 y/o 77 kg diabetic, cardiomyopathy, hx CHF  Indication for Antimicrobials: foot ulcer  Current Regimen of Each Antimicrobial (Start Day & Day of Therapy):    Zosyn 3.375 gm IV x 1  Start Date 21 in ED  Vancomycin 1500 mg IV x 1 Start date 21 in ED    Goal Vancomycin Level : 10-15 mcg/ml  Level(s) Ordered: will follow and order as needed if patient admitted    Significant Cultures:   Blood culures x 2    Paralysis, amputations:   Recent Labs     21  1647   CREA 1.09   BUN 18   WBC 7.1     Temp (24hrs), Av.2 °F (37.3 °C), Min:99.2 °F (37.3 °C), Max:99.2 °F (37.3 °C)    Creatinine Clearance (Creatinine Clearance (ml/min)): 98 ml/min       Impression/Plan: 37 y/o 77 kg diabetic in ED with foot ulcer, temp of 99, WBC 7.1  Treatment started with Zosyn 3.375 gm and Vancomycin loading dose of 1500mg. CBC and BMP already ordered. Will follow and order maintenance vancomycin dosing if patient admitted. Pharmacy will follow daily and adjust medications as appropriate for renal function and/or serum levels.     Thank you,  Mignon Andrews, SARAHI     Renal Dosing Tables on [employer]

## 2021-05-04 NOTE — PROGRESS NOTES
Contacted [facility] to arrange ALS transport to ED [facility] ED. Spoke to Will. Discussed patient condition, and need for IV vancomycin. ETA is 2100. ED is aware.

## 2021-05-05 NOTE — PROGRESS NOTES
Hospitalist Progress Note NAME: Wero Guajardo :  1980 MRN:  [id number] Sirena Tierney is a 36 y.o.  male past history significant for ischemic cardiomyopathy with ejection fraction of 20%, type 2 diabetes, hypertension, presenting with complaints of worsening foot ulcer that he had discovered about a week ago and that it has started to become purulent with pus in the last day. Assessment / Plan: Suspected right foot osteomyelitis -X-ray of right foot shows evidence of Charcot joint versus septic arthritis/osteomyelitis 
-MRI of the right foot is pending 
-Follow blood cultures that were sent in the ED along with wound cultures 
-Continue clindamycin and cefepime that was started by admitting MD 
-Pending podiatry evaluation 
-Informed RN to please reach out to podiatrist to see if they are planning on any procedure otherwise patient can eat Type 2 diabetes mellitus with hyperglycemia Last A1c is 6.1% No reported diabetic medications We will keep on sliding scale 
-Start diabetic diet if no procedures are planned today 
  
Systolic heart failure ejection fraction of 20% History of coronary artery disease cardiac MRI showing only viable RCA territory underwent PTCA stenting in  Hypertension 
-Continue aspirin, statin and Coreg 
-Holding home Plavix in anticipation for surgery History of left ventricular thrombus was on Eliquis for 6 months 
  
Code Status: Full code Surrogate Decision Maker: Wife information on record 
  
DVT Prophylaxis: SCDs, start heparin after surgery GI Prophylaxis: not indicated 
  
Baseline: Independent Body mass index is 24.11 kg/m². Subjective: Chief Complaint / Reason for Physician Visit Reports right foot pain that is about 3 x 5. N.p.o. pending podiatry evaluation No fever Overnight events noted and discussed with nursing Review of Systems: 
Symptom Y/N Comments  Symptom Y/N Comments Fever/Chills    Chest Pain    
Poor Appetite    Edema Cough    Abdominal Pain Sputum    Joint Pain SOB/DANIELSON    Pruritis/Rash Nausea/vomit    Tolerating PT/OT Diarrhea    Tolerating Diet Constipation    Other Could NOT obtain due to:   
 
Objective: VITALS:  
Last 24hrs VS reviewed since prior progress note. Most recent are: 
Patient Vitals for the past 24 hrs: 
 Temp Pulse Resp BP SpO2  
05/05/21 0720 98.2 °F (36.8 °C) 68 10 (!) 163/97 97 % 05/05/21 0416  63  (!) 153/84 97 % 05/05/21 0145 98.8 °F (37.1 °C) 60  (!) 148/91 98 % 05/05/21 0100    (!) 148/84 99 % 05/05/21 0000    (!) 147/84 99 % 05/04/21 2330    (!) 148/86 99 % 05/04/21 2300    (!) 161/98 98 % 05/04/21 2245 98.7 °F (37.1 °C) 66 16 (!) 145/94 99 % Intake/Output Summary (Last 24 hours) at 5/5/2021 1343 Last data filed at 5/5/2021 9128 Gross per 24 hour Intake  Output 700 ml Net -700 ml PHYSICAL EXAM: 
General: WD, WN. Alert, cooperative, no acute distress   
EENT:  EOMI. Anicteric sclerae. MMM Resp:  CTA bilaterally, no wheezing or rales. No accessory muscle use CV:  Regular  rhythm,  No edema GI:  Soft, Non distended, Non tender.  +Bowel sounds Neurologic:  Alert and oriented X 3, normal speech, Psych:   Not anxious nor agitated Skin:  Right foot erythema and tenderness present, previous amputation of toes Reviewed most current lab test results and cultures  YES Reviewed most current radiology test results   YES Review and summation of old records today    NO Reviewed patient's current orders and MAR    YES 
PMH/SH reviewed - no change compared to H&P Current Facility-Administered Medications:  
  aspirin delayed-release tablet 81 mg, 81 mg, Oral, DAILY, Niko Suarez MD, Stopped at 05/05/21 0900 
  atorvastatin (LIPITOR) tablet 40 mg, 40 mg, Oral, QHS, Niko Suarez MD, 40 mg at 05/05/21 6435   carvediloL (COREG) tablet 12.5 mg, 12.5 mg, Oral, BID WITH MEALS, Paris Suarez MD, Stopped at 05/05/21 0800 
  sodium chloride (NS) flush 5-40 mL, 5-40 mL, IntraVENous, Q8H, Paris Suarez MD, Stopped at 05/05/21 0600 
  sodium chloride (NS) flush 5-40 mL, 5-40 mL, IntraVENous, PRN, Paris Suarez MD 
  acetaminophen (TYLENOL) tablet 650 mg, 650 mg, Oral, Q6H PRN **OR** acetaminophen (TYLENOL) suppository 650 mg, 650 mg, Rectal, Q6H PRN, Paris Suarez MD 
  polyethylene glycol (MIRALAX) packet 17 g, 17 g, Oral, DAILY PRN, Paris Suarez MD 
  promethazine (PHENERGAN) tablet 12.5 mg, 12.5 mg, Oral, Q6H PRN **OR** ondansetron (ZOFRAN) injection 4 mg, 4 mg, IntraVENous, Q6H PRN, Paris Suarez MD 
  cefepime (MAXIPIME) 1 g in 0.9% sodium chloride (MBP/ADV) 50 mL MBP, 1 g, IntraVENous, Q8H, Paris Suarez MD, Last Rate: 100 mL/hr at 05/05/21 1203, 1 g at 05/05/21 1203   clindamycin (CLEOCIN) 600mg D5W 50mL IVPB (premix), 600 mg, IntraVENous, Q8H, Paris Suarez MD, Last Rate: 100 mL/hr at 05/05/21 1247, 600 mg at 05/05/21 1247 
  glucose chewable tablet 16 g, 4 Tab, Oral, PRN, Paris Suarez MD 
  dextrose (D50W) injection syrg 12.5-25 g, 25-50 mL, IntraVENous, PRN, Paris Suarez MD, 25 g at 05/05/21 1258   glucagon (GLUCAGEN) injection 1 mg, 1 mg, IntraMUSCular, PRN, Paris Suarez MD 
  insulin lispro (HUMALOG) injection, , SubCUTAneous, AC&HS, Paris Suarez MD, Stopped at 05/05/21 0730 
________________________________________________________________________ Care Plan discussed with: 
  Comments Patient y Family RN y   
Care Manager Consultant Multidiciplinary team rounds were held today with , nursing, pharmacist and clinical coordinator. Patient's plan of care was discussed; medications were reviewed and discharge planning was addressed.     
________________________________________________________________________ Total NON critical care TIME: 35   Minutes Total CRITICAL CARE TIME Spent:   Minutes non procedure based Comments >50% of visit spent in counseling and coordination of care    
________________________________________________________________________ Suyapa Seymour MD  
 
Procedures: see electronic medical records for all procedures/Xrays and details which were not copied into this note but were reviewed prior to creation of Plan. LABS: 
I reviewed today's most current labs and imaging studies. Pertinent labs include: 
Recent Labs 05/04/21 
1647 WBC 7.1 HGB 12.1 HCT 36.3*  
 Recent Labs 05/04/21 
1647 * K 3.9 CL 98  
CO2 26 GLU 89 BUN 18 CREA 1.09  
CA 8.6 ALB 3.3* TBILI 1.4* ALT 44 Signed: Suyapa Seymour MD

## 2021-05-05 NOTE — ED PROVIDER NOTES
EMERGENCY DEPARTMENT HISTORY AND PHYSICAL EXAM 
 
 
Date: 5/4/2021 Patient Name: Sonia Bar History of Presenting Illness Chief Complaint Patient presents with  Foot Pain Transfer of care from [facility] Osteomyelitis right foot; 2 ulcers; 1 infected History Provided By: Patient and Records from [address] [place] [address] ED 
 
HPI: Sonia Bar, 36 y.o. male with PMHx significant for coronary artery disease, type 2 diabetes, diabetic neuropathy, previous right foot osteomyelitis requiring amputation of third toe and status post amputation of second through fourth metatarsals, presents to the ED as a transfer from [address] [place] [address] for osteomyelitis and infected diabetic ulcer of right foot. Patient reports that he frequently develops ulcers on the bottom of his right foot. He usually does wound care and they resolve, but an ulcer formed a few days ago that has gotten progressively larger and today he noticed pus draining from the wound. He presented to [address] [place] [address] ED where he had lab work that showed a normal white count, but x-ray showed osteomyelitis versus Charcot foot with erosion of the first MTP joint. Patient also noted to have pus draining from an ulcer that over lies plantar surface of foot at the base of the fifth toe. Attempted to admit at [address] [place] [address], but surgeon there requested that patient be transferred to [facility] for further management. Patient reports he has minimal sensation of his bilateral feet. Does not have significant pain at this time. PCP: None Current Facility-Administered Medications on File Prior to Encounter Medication Dose Route Frequency Provider Last Rate Last Admin  [COMPLETED] piperacillin-tazobactam (ZOSYN) 3.375 g in 0.9% sodium chloride (MBP/ADV) 100 mL MBP  3.375 g IntraVENous NOW Maria Esther Thomas MD   Stopped at 05/04/21 3929  
 [COMPLETED] vancomycin (VANCOCIN) 1,500 mg in 0.9% sodium chloride 500 mL IVPB  1,500 mg IntraVENous ONCE Petty Rosa MD   Stopped at 21 Current Outpatient Medications on File Prior to Encounter Medication Sig Dispense Refill  atorvastatin (LIPITOR) 40 mg tablet Take 1 tablet by mouth nightly 30 Tab 0  carvediloL (COREG) 12.5 mg tablet Take 1 tablet by mouth twice daily 60 Tab 0  clopidogreL (PLAVIX) 75 mg tab Take 1 tablet by mouth once daily 30 Tab 0  
 furosemide (LASIX) 40 mg tablet Take 1 tablet by mouth once daily 30 Tab 0  
 losartan (COZAAR) 50 mg tablet Take 1 tablet by mouth once daily 30 Tab 1  
 aspirin delayed-release 81 mg tablet Take 1 Tab by mouth daily. 30 Tab 6  
 methylPREDNISolone (MEDROL DOSEPACK) 4 mg tablet As directed. 1 Dose Pack 0 Past History Past Medical History: 
Past Medical History:  
Diagnosis Date  Diabetes ([address] Past Surgical History: 
Past Surgical History:  
Procedure Laterality Date  HX AMPUTATION  10/02/2012 Right 3rd toe Family History: 
Family History Problem Relation Age of Onset  Heart Disease Maternal Grandmother  Cancer Paternal Grandmother   
     Sarah Oleary  Stroke Paternal Grandfather Social History: 
Social History Tobacco Use  Smoking status: Former Smoker Packs/day: 1.00 Years: 21.00 Pack years: 21.00 Types: Cigarettes Quit date: 2019 Years since quittin.4  Smokeless tobacco: Never Used Substance Use Topics  Alcohol use: No  
 Drug use: Never Allergies: Allergies Allergen Reactions  Eliquis [Apixaban] Hives Review of Systems Review of Systems Constitutional: Negative for chills and fever. HENT: Negative for congestion, ear pain, rhinorrhea and sore throat. Respiratory: Negative for cough, chest tightness, shortness of breath and wheezing. Cardiovascular: Negative for chest pain and palpitations. Gastrointestinal: Negative for abdominal pain, diarrhea, nausea and vomiting. Genitourinary: Negative for dysuria, flank pain and hematuria. Musculoskeletal: Negative for back pain, myalgias and neck pain. Skin: Positive for wound. Neurological: Negative for dizziness, syncope, light-headedness and headaches. Psychiatric/Behavioral: Negative for confusion. The patient is not nervous/anxious. All other systems reviewed and are negative. Physical Exam  
 General appearance - well nourished, well appearing, and in no distress Eyes - pupils equal and reactive, extraocular eye movements intact ENT - mucous membranes moist, pharynx normal without lesions Neck - supple, no significant adenopathy; non-tender to palpation Chest - clear to auscultation, no wheezes, rales or rhonchi; non-tender to palpation Heart - normal rate and regular rhythm, S1 and S2 normal, no murmurs noted Abdomen - soft, nontender, nondistended, no masses or organomegaly Musculoskeletal - no joint tenderness, deformity or swelling; normal ROM Extremities - peripheral pulses normal, no pedal edema Skin - normal coloration and turgor, no rashes, 2 ulcers noted on the plantar aspect of the right foot, 1 at the base of the second toe and one at the base of the fifth toe, there is erythema and swelling around the ulcer at the base of the fifth toe and, with compression of tissues around the wound, pus is expressed. There is mild tenderness over the lateral aspect of the right foot Neurological - alert, oriented x3, normal speech, no focal findings or movement disorder noted Diagnostic Study Results Labs - Recent Results (from the past 12 hour(s)) CBC WITH AUTOMATED DIFF Collection Time: 05/04/21  4:47 PM  
Result Value Ref Range WBC 7.1 4.1 - 11.1 K/uL  
 RBC 4.36 4.10 - 5.70 M/uL  
 HGB 12.1 12.1 - 17.0 g/dL HCT 36.3 (L) 36.6 - 50.3 % MCV 83.3 80.0 - 99.0 FL  
 MCH 27.8 26.0 - 34.0 PG  
 MCHC 33.3 30.0 - 36.5 g/dL  
 RDW 13.2 11.5 - 14.5 % PLATELET 232 148 - 450 K/uL  MPV 10.8 8.9 - 12.9 FL  
 NRBC 0.0 0  WBC ABSOLUTE NRBC 0.00 0.00 - 0.01 K/uL NEUTROPHILS 84 (H) 32 - 75 % BAND NEUTROPHILS 1 % LYMPHOCYTES 13 12 - 49 % MONOCYTES 1 (L) 5 - 13 % EOSINOPHILS 1 0 - 7 % BASOPHILS 0 0 - 1 % IMMATURE GRANULOCYTES 0 0.0 - 0.5 % ABS. NEUTROPHILS 6.0 1.8 - 8.0 K/UL  
 ABS. LYMPHOCYTES 0.9 0.8 - 3.5 K/UL  
 ABS. MONOCYTES 0.1 0.0 - 1.0 K/UL  
 ABS. EOSINOPHILS 0.1 0.0 - 0.4 K/UL  
 ABS. BASOPHILS 0.0 0.0 - 0.1 K/UL  
 ABS. IMM. GRANS. 0.0 0.00 - 0.04 K/UL  
 DF MANUAL    
 RBC COMMENTS ANISOCYTOSIS 
1+ METABOLIC PANEL, COMPREHENSIVE Collection Time: 05/04/21  4:47 PM  
Result Value Ref Range Sodium 135 (L) 136 - 145 mmol/L Potassium 3.9 3.5 - 5.1 mmol/L Chloride 98 97 - 108 mmol/L  
 CO2 26 21 - 32 mmol/L Anion gap 11 5 - 15 mmol/L Glucose 89 65 - 100 mg/dL BUN 18 6 - 20 MG/DL Creatinine 1.09 0.70 - 1.30 MG/DL  
 BUN/Creatinine ratio 17 12 - 20 GFR est AA >60 >60 ml/min/1.73m2 GFR est non-AA >60 >60 ml/min/1.73m2 Calcium 8.6 8.5 - 10.1 MG/DL Bilirubin, total 1.4 (H) 0.2 - 1.0 MG/DL  
 ALT (SGPT) 44 12 - 78 U/L  
 AST (SGOT) 32 15 - 37 U/L Alk. phosphatase 124 (H) 45 - 117 U/L Protein, total 7.2 6.4 - 8.2 g/dL Albumin 3.3 (L) 3.5 - 5.0 g/dL Globulin 3.9 2.0 - 4.0 g/dL A-G Ratio 0.8 (L) 1.1 - 2.2 SED RATE (ESR) Collection Time: 05/04/21  4:47 PM  
Result Value Ref Range Sed rate, automated 62 (H) 0 - 15 mm/hr Radiologic Studies - No orders to display CT Results  (Last 48 hours) None CXR Results  (Last 48 hours) None Medical Decision Making I am the first provider for this patient. I reviewed the vital signs, available nursing notes, past medical history, past surgical history, family history and social history. Vital Signs-Reviewed the patient's vital signs.  
Patient Vitals for the past 12 hrs: 
 Temp Pulse Resp BP SpO2  
05/04/21 2330    (!) 148/86 99 %  
05/04/21 2300    (!) 161/98 98 % 05/04/21 2245 98.7 °F (37.1 °C) 66 16 (!) 145/94 99 % Records Reviewed: Nursing Notes and Old Medical Records Provider Notes (Medical Decision Making):  
Differential diagnosis: Cellulitis, infected ulcer, osteomyelitis Patient has already received Zosyn and vancomycin in the ED at [facility]. Will admit to hospitalist. 
 
ED Course:  
Initial assessment performed. The patients presenting problems have been discussed, and they are in agreement with the care plan formulated and outlined with them. I have encouraged them to ask questions as they arise throughout their visit. Progress Notes: 
 Case discussed with Dr. Brandy Plascencia (hospitalist) who will see and admit the patient. Disposition: 
Admit to hospitalist 
 
  
 
Diagnosis Clinical Impression: 1. Diabetic foot ulcer with osteomyelitis ([address] 2. Cellulitis of foot

## 2021-05-05 NOTE — ED NOTES
Attempted to call report to KEYANA Levin, Gen Surg. RN states she will call back. [phone] - TRANSFER - OUT REPORT:    Verbal report given to KEYANA Levin(name) on Julita Gonzalez  being transferred to Gen Surg Room 2136(unit) for routine progression of care       Report consisted of patients Situation, Background, Assessment and   Recommendations(SBAR). Information from the following report(s) ED Summary was reviewed with the receiving nurse. Lines:   Peripheral IV 05/04/21 Left Forearm (Active)   Site Assessment Clean, dry, & intact 05/05/21 0056   Phlebitis Assessment 0 05/05/21 0056   Infiltration Assessment 0 05/05/21 0056   Dressing Status Clean, dry, & intact 05/05/21 0056   Dressing Type Transparent 05/05/21 0056        Opportunity for questions and clarification was provided.       Patient transported with:   [employer]

## 2021-05-05 NOTE — PROGRESS NOTES
MRI PENDING    MRI SCREENING SHEET NEEDS TO BE COMPLETED AND SIGNED      CALL [phone] WHEN THIS IS DONE    FAX [phone]

## 2021-05-05 NOTE — ED TRIAGE NOTES
Transfer of care from [facility] Osteomyelitis right foot; 2 ulcers; 1 infected

## 2021-05-05 NOTE — CONSULTS
Podiatry Consult Subjective: Patient admitted for chronic, draining wound plantar right foot. He has a Hx of amputations from diabetic infections. He knew this wound was not going to heal when he began to feel bone in the wound, and presented to the ED. He also relates a chronic Hx of reoccurring ulcers plantar right forefoot for years. Date of Consultation: May 5, 2021 Referring Physician: Dr. Segun Lebron MD 
 
Patient is a 36 y.o.  male who is being seen for osteomyelitis right foot. Patient Active Problem List  
 Diagnosis Date Noted  Diabetic foot infection [address] 2021  Ischemic cardiomyopathy 2019  Coronary artery disease involving native coronary artery of native heart without angina pectoris 2019  S/P PTCA (percutaneous transluminal coronary angioplasty) 2019  Hypertension 2019  Biventricular failure ([address] 2019  Non-healing ulcer of foot ([facility] 10/20/2013 Past Medical History:  
Diagnosis Date  Diabetes ([facility] Past Surgical History:  
Procedure Laterality Date  HX AMPUTATION  10/02/2012 Right 3rd toe Family History Problem Relation Age of Onset  Heart Disease Maternal Grandmother  Cancer Paternal Grandmother   
     Jan Taylor  Stroke Paternal Grandfather Social History Tobacco Use  Smoking status: Former Smoker Packs/day: 1.00 Years: 21.00 Pack years: 21.00 Types: Cigarettes Quit date: 2019 Years since quittin.4  Smokeless tobacco: Never Used Substance Use Topics  Alcohol use: No  
 
Current Facility-Administered Medications Medication Dose Route Frequency Provider Last Rate Last Admin  aspirin delayed-release tablet 81 mg  81 mg Oral DAILY Madai Suarez MD   Stopped at 21 0900  
 atorvastatin (LIPITOR) tablet 40 mg  40 mg Oral QHS Serenity Arce MD   40 mg at 21 9814  carvediloL (COREG) tablet 12.5 mg 12.5 mg Oral BID WITH MEALS Nafisa Suarez MD   Stopped at 21 0800  
 sodium chloride (NS) flush 5-40 mL  5-40 mL IntraVENous Q8H Nafisa Suarez MD   Stopped at 21 0600  
 sodium chloride (NS) flush 5-40 mL  5-40 mL IntraVENous PRN Nafisa Suarez MD      
 acetaminophen (TYLENOL) tablet 650 mg  650 mg Oral Q6H PRN Nafisa Suarez MD      
 Or  
Riky.Copas acetaminophen (TYLENOL) suppository 650 mg  650 mg Rectal Q6H PRN Nafisa Suarez MD      
 polyethylene glycol (MIRALAX) packet 17 g  17 g Oral DAILY PRN Nafisa Suarez MD      
 promethazine (PHENERGAN) tablet 12.5 mg  12.5 mg Oral Q6H PRN Nafisa Suarez MD      
 Or  
 [facility] injection 4 mg  4 mg IntraVENous Q6H PRN Nafisa Suarez MD      
 cefepime (MAXIPIME) 1 g in 0.9% sodium chloride (MBP/ADV) 50 mL MBP  1 g IntraVENous Q8H Nafisa Suarez  mL/hr at 21 1203 1 g at 21 1203  clindamycin (CLEOCIN) 600mg D5W 50mL IVPB (premix)  600 mg IntraVENous Q8H Nafisa Suarez  mL/hr at 21 1247 600 mg at 21 1247  
 glucose chewable tablet 16 g  4 Tab Oral PRN Cee Bishop MD      
 dextrose (D50W) injection syrg 12.5-25 g  25-50 mL IntraVENous PRN Cee Bishop MD   25 g at 21 1258  glucagon (GLUCAGEN) injection 1 mg  1 mg IntraMUSCular PRN Nafisa Suarez MD      
 insulin lispro (HUMALOG) injection   SubCUTAneous AC&HS Nafisa Suarez MD   Stopped at 21 0730 Allergies Allergen Reactions  Eliquis [Apixaban] Hives Review of Systems:  AO x3. NAD. Objective:  
 
Patient Vitals for the past 8 hrs: 
 BP Temp Pulse Resp SpO2  
21 0720 (!) 163/97 98.2 °F (36.8 °C) 68 10 97 % Temp (24hrs), Av.7 °F (37.1 °C), Min:98.2 °F (36.8 °C), Max:99.2 °F (37.3 °C) Physical Exam Lower Extremities: There is local erythema and edema of the right forefoot.  There is a dry eschar PMTPJ 1 right. There is an open wound with exposed bone PMTPJ 4 right foot, with light purulent drainage. DP and PT 2/4 bilaterally. CFT less than 3 seconds Sensation absent to light touch bilateral feet. Amputation of the right  2nd toe, and multi-planar deformities of the remaining lesser toes. Plantar prominence of the right 1st MPJ. X-rays right foot: Amputation of the 2nd toe and metatarsal heads 2, 3, 4 and 5. Severe bone erosion of the head of the 1st metatarsal and base of the hallux proximal phalanx. MRI: pending Lab Review:  
Recent Results (from the past 24 hour(s)) CULTURE, BLOOD Collection Time: 05/04/21  4:45 PM  
 Specimen: Blood Result Value Ref Range Special Requests: NO SPECIAL REQUESTS Culture result: NO GROWTH AFTER 16 HOURS    
CBC WITH AUTOMATED DIFF Collection Time: 05/04/21  4:47 PM  
Result Value Ref Range WBC 7.1 4.1 - 11.1 K/uL  
 RBC 4.36 4.10 - 5.70 M/uL  
 HGB 12.1 12.1 - 17.0 g/dL HCT 36.3 (L) 36.6 - 50.3 % MCV 83.3 80.0 - 99.0 FL  
 MCH 27.8 26.0 - 34.0 PG  
 MCHC 33.3 30.0 - 36.5 g/dL  
 RDW 13.2 11.5 - 14.5 % PLATELET 013 360 - 769 K/uL MPV 10.8 8.9 - 12.9 FL  
 NRBC 0.0 0  WBC ABSOLUTE NRBC 0.00 0.00 - 0.01 K/uL NEUTROPHILS 84 (H) 32 - 75 % BAND NEUTROPHILS 1 % LYMPHOCYTES 13 12 - 49 % MONOCYTES 1 (L) 5 - 13 % EOSINOPHILS 1 0 - 7 % BASOPHILS 0 0 - 1 % IMMATURE GRANULOCYTES 0 0.0 - 0.5 % ABS. NEUTROPHILS 6.0 1.8 - 8.0 K/UL  
 ABS. LYMPHOCYTES 0.9 0.8 - 3.5 K/UL  
 ABS. MONOCYTES 0.1 0.0 - 1.0 K/UL  
 ABS. EOSINOPHILS 0.1 0.0 - 0.4 K/UL  
 ABS. BASOPHILS 0.0 0.0 - 0.1 K/UL  
 ABS. IMM. GRANS. 0.0 0.00 - 0.04 K/UL  
 DF MANUAL    
 RBC COMMENTS ANISOCYTOSIS 
1+ METABOLIC PANEL, COMPREHENSIVE Collection Time: 05/04/21  4:47 PM  
Result Value Ref Range Sodium 135 (L) 136 - 145 mmol/L Potassium 3.9 3.5 - 5.1 mmol/L Chloride 98 97 - 108 mmol/L  
 CO2 26 21 - 32 mmol/L  Anion gap 11 5 - 15 mmol/L Glucose 89 65 - 100 mg/dL BUN 18 6 - 20 MG/DL Creatinine 1.09 0.70 - 1.30 MG/DL  
 BUN/Creatinine ratio 17 12 - 20 GFR est AA >60 >60 ml/min/1.73m2 GFR est non-AA >60 >60 ml/min/1.73m2 Calcium 8.6 8.5 - 10.1 MG/DL Bilirubin, total 1.4 (H) 0.2 - 1.0 MG/DL  
 ALT (SGPT) 44 12 - 78 U/L  
 AST (SGOT) 32 15 - 37 U/L Alk. phosphatase 124 (H) 45 - 117 U/L Protein, total 7.2 6.4 - 8.2 g/dL Albumin 3.3 (L) 3.5 - 5.0 g/dL Globulin 3.9 2.0 - 4.0 g/dL A-G Ratio 0.8 (L) 1.1 - 2.2 CULTURE, BLOOD Collection Time: 05/04/21  4:47 PM  
 Specimen: Blood Result Value Ref Range Special Requests: NO SPECIAL REQUESTS Culture result: NO GROWTH AFTER 16 HOURS    
SED RATE (ESR) Collection Time: 05/04/21  4:47 PM  
Result Value Ref Range Sed rate, automated 62 (H) 0 - 15 mm/hr HEMOGLOBIN A1C WITH EAG Collection Time: 05/05/21  6:37 AM  
Result Value Ref Range Hemoglobin A1c 5.6 4.0 - 5.6 % Est. average glucose 114 mg/dL GLUCOSE, POC Collection Time: 05/05/21  8:47 AM  
Result Value Ref Range Glucose (POC) 78 65 - 100 mg/dL Performed by Reinier Connolly [facility] GLUCOSE, POC Collection Time: 05/05/21 12:54 PM  
Result Value Ref Range Glucose (POC) 64 (L) 65 - 100 mg/dL Performed by Lisa Grubbs (ZEUS RN) GLUCOSE, POC Collection Time: 05/05/21  1:15 PM  
Result Value Ref Range Glucose (POC) 162 (H) 65 - 100 mg/dL Performed by Lisa Grubbs (ZEUS RN) Impression: 1. Ulcer to bone right foot 2. Osteomyelitis right 4th metatarsal 
3. Possible osteomyelitis right 1st MPJ Recommendation: I discussed the situation with the patient. He will need at least resection of the right 4th metatarsal, and possibly the right 1st ray as well. The MRI will provide some more information after it is completed. However, I asked him to consider a right TMA.  He has severe deformity of the weight bearing surface of the right forefoot due to the prior surgeries, and I expect he will continue to have frequent ulcerations unless the WB surface is evened out with a TMA. I will F/U tomorrow.

## 2021-05-05 NOTE — PROGRESS NOTES
Pharmacy Automatic Renal Dosing Protocol - Antimicrobials Indication for Antimicrobials:   Osteomyelitis- s/p right third toe amputation (in ) Current Regimen of Each Antimicrobial: 
Vancomycin 1500mg x1 then 1000mg IV q12h - Pharmacy to dose (Start Date  ; Day # 2 of 7) Cefepime 1gm IV q8h         (start date ; Day #1) Clindamycin 600mg IV q8h (start date ; Day #1) Previous Antimicrobial Therapy: 
Zosyn 3.375gm IV x1 21 Goal Level: VANCOMYCIN TROUGH GOAL RANGE Vancomycin Trough: 15 - 20 mcg/mL  (AUC: 400 - 600 mg/hr/Liter/day) Date Dose & Interval Measured (mcg/mL) Extrapolated (mcg/mL) / AUC Date & time of next level: before 3rd maint. dose if load given  ~ 3AM   Significant Cultures:  
 Blood-  NG- Prelim  Wound-  pending Radiology / Imaging results: (X-ray, CT scan or MRI):  
  MRI of foot- pending Paralysis, amputations, malnutrition: Right 3rd toe amputation Labs: 
Recent Labs 21 
1647 CREA 1.09 BUN 18 WBC 7.1 Temp (24hrs), Av.7 °F (37.1 °C), Min:98.2 °F (36.8 °C), Max:99.2 °F (37.3 °C) Is the Patient on Dialysis? No 
 
Creatinine Clearance (mL/min):  
CrCl (Actual Body Weight): 105.6 CrCl (Adjusted Body Weight): 103.3 CrCl (Ideal Body Weight): 101.8 Impression/Plan: · Will confirm with Dr. Saul Sumner that Vanco was not dc'ed. It looks like the current order is on the incorrect encounter. · Added BMP daily x3, CBC QOD · Antimicrobial stop date -tbd Pharmacy will follow daily and adjust medications as appropriate for renal function and/or serum levels. Thank you, [address], [facility]  
 
------------------------------------------------------- 
Levi Mahajan Confirmed with Dr. Saul Sumner that Vancomycin is dc'ed  Via [employer]. Continue Clindamycin IV and Cefepime IV as above.  
 Deb Mead, [facility]

## 2021-05-05 NOTE — H&P
. 
               
Hospitalist Admission Note NAME: Jimenez Weiner :  1980 MRN:  [id number] Date/Time:  2021 11:38 PM 
 
Patient PCP: None 
______________________________________________________________________ Given the patient's current clinical presentation, I have a high level of concern for decompensation if discharged from the emergency department. Complex decision making was performed, which includes reviewing the patient's available past medical records, laboratory results, and x-ray films. My assessment of this patient's clinical condition and my plan of care is as follows. Assessment / Plan: 
 
Infected right diabetic foot History of right third toe amputation Admit to surgical pablo Cultures collected and will start on cefepime, clindamycin and vancomycin MRI of the foot Podiatry consultation Type 2 diabetes Last A1c is 6.1% No reported diabetic medications We will keep on sliding scale Systolic heart failure ejection fraction of 20% 
three-vessel disease cardiac MRI showing only viable RCA territory underwent PTCA stenting in  Hypertension History of left ventricular thrombus was on Eliquis for 6 months We will continue with beta-blocker, statin, aspirin We will hold off clopidogrel and ARB in preparation for surgery Code Status: Full code Surrogate Decision Maker: Wife information on record DVT Prophylaxis: SCDs GI Prophylaxis: not indicated Baseline: Independent Subjective: CHIEF COMPLAINT: Pus discharge from foot HISTORY OF PRESENT ILLNESS:    
Tatiana Jo is a 36 y.o.  male past history significant for ischemic cardiomyopathy with ejection fraction of 20%, type 2 diabetes, hypertension, presenting with complaints of worsening foot ulcer that he had discovered about a week ago and that it has started to become purulent with pus in the last day. He denies any pain, no chills or fever, No respiratory symptoms, GI or urinary symptoms. We were asked to admit for work up and evaluation of the above problems. Past Medical History:  
Diagnosis Date  Diabetes ([address] Past Surgical History:  
Procedure Laterality Date  HX AMPUTATION  10/02/2012 Right 3rd toe Social History Tobacco Use  Smoking status: Former Smoker Packs/day: 1.00 Years: 21.00 Pack years: 21.00 Types: Cigarettes Quit date: 2019 Years since quittin.4  Smokeless tobacco: Never Used Substance Use Topics  Alcohol use: No  
  
 
Family History Problem Relation Age of Onset  Heart Disease Maternal Grandmother  Cancer Paternal Grandmother   
     Erika Husbands  Stroke Paternal Grandfather Allergies Allergen Reactions  Eliquis [Apixaban] Hives Prior to Admission medications Medication Sig Start Date End Date Taking? Authorizing Provider  
atorvastatin (LIPITOR) 40 mg tablet Take 1 tablet by mouth nightly 21  Yes Cyndy Platt MD  
carvediloL (COREG) 12.5 mg tablet Take 1 tablet by mouth twice daily 21  Yes Filiberto Gauthier MD  
clopidogreL (PLAVIX) 75 mg tab Take 1 tablet by mouth once daily 21  Yes Filiberto Gauthier MD  
furosemide (LASIX) 40 mg tablet Take 1 tablet by mouth once daily 21  Yes Filiberto Gauthier MD  
losartan (COZAAR) 50 mg tablet Take 1 tablet by mouth once daily 21  Yes Jono BUSTAMANTE NP  
aspirin delayed-release 81 mg tablet Take 1 Tab by mouth daily. 20  Yes Jono BUSTAMANTE NP  
methylPREDNISolone (MEDROL DOSEPACK) 4 mg tablet As directed. 19   Rusty Cook NP  
 
 
REVIEW OF SYSTEMS:    
I am not able to complete the review of systems because: The patient is intubated and sedated The patient has altered mental status due to his acute medical problems The patient has baseline aphasia from prior stroke(s)  The patient has baseline dementia and is not reliable historian The patient is in acute medical distress and unable to provide information Total of 12 systems reviewed as follows:   
   POSITIVE= underlined text  Negative = text not underlined General:  fever, chills, sweats, generalized weakness, weight loss/gain,  
   loss of appetite Eyes:    blurred vision, eye pain, loss of vision, double vision ENT:    rhinorrhea, pharyngitis Respiratory:   cough, sputum production, SOB, DANIELSON, wheezing, pleuritic pain  
Cardiology:   chest pain, palpitations, orthopnea, PND, edema, syncope Gastrointestinal:  abdominal pain , N/V, diarrhea, dysphagia, constipation, bleeding Genitourinary:  frequency, urgency, dysuria, hematuria, incontinence Muskuloskeletal :  arthralgia, myalgia, back pain Hematology:  easy bruising, nose or gum bleeding, lymphadenopathy Dermatological: rash, ulceration, pruritis, color change / jaundice Endocrine:   hot flashes or polydipsia Neurological:  headache, dizziness, confusion, focal weakness, paresthesia, Speech difficulties, memory loss, gait difficulty Psychological: Feelings of anxiety, depression, agitation Objective: VITALS:   
Visit Vitals BP (!) 145/94 (BP 1 Location: Right upper arm, BP Patient Position: At rest) Pulse 66 Temp 98.7 °F (37.1 °C) Resp 16 Ht 6' 1\" (1.854 m) Wt 82.9 kg (182 lb 12.2 oz) SpO2 99% BMI 24.11 kg/m² PHYSICAL EXAM: 
 
General:    Alert, cooperative, no distress, appears stated age. HEENT: Atraumatic, anicteric sclerae, pink conjunctivae No oral ulcers, mucosa moist, throat clear, dentition fair Neck:  Supple, symmetrical,  thyroid: non tender Lungs:   Clear to auscultation bilaterally. No Wheezing or Rhonchi. No rales. Chest wall:  No tenderness  No Accessory muscle use. Heart:   Regular  rhythm,  No  murmur   No edema Abdomen:   Soft, non-tender. Not distended. Bowel sounds normal 
Extremities: No cyanosis.   No clubbing,   
  Skin turgor normal, Capillary refill normal, Radial dial pulse 2+ Right foot with ulcer at the base of the fifth toe purulent discharge was observed, dorsalis pedis pulses bounding. Skin:     Not pale. Not Jaundiced  No rashes Psych:  Good insight. Not depressed. Not anxious or agitated. Neurologic: EOMs intact. No facial asymmetry. No aphasia or slurred speech. Symmetrical strength, Sensation grossly intact. Alert and oriented X 4.  
 
_______________________________________________________________________ Care Plan discussed with: 
  Comments Patient x Family RN Care Manager Consultant:  daryl MARTINEZ attending  
_______________________________________________________________________ Expected  Disposition:  
Home with Family x HH/PT/OT/RN   
SNF/LTC   
VERA   
________________________________________________________________________ TOTAL TIME:  45 Minutes Critical Care Provided     Minutes non procedure based Comments  
 x Reviewed previous records  
>50% of visit spent in counseling and coordination of care x Discussion with patient and/or family and questions answered 
  
 
________________________________________________________________________ Signed: Maricel Singh MD 
 
Procedures: see electronic medical records for all procedures/Xrays and details which were not copied into this note but were reviewed prior to creation of Plan. LAB DATA REVIEWED:   
Recent Results (from the past 24 hour(s)) CBC WITH AUTOMATED DIFF Collection Time: 05/04/21  4:47 PM  
Result Value Ref Range WBC 7.1 4.1 - 11.1 K/uL  
 RBC 4.36 4.10 - 5.70 M/uL  
 HGB 12.1 12.1 - 17.0 g/dL HCT 36.3 (L) 36.6 - 50.3 % MCV 83.3 80.0 - 99.0 FL  
 MCH 27.8 26.0 - 34.0 PG  
 MCHC 33.3 30.0 - 36.5 g/dL  
 RDW 13.2 11.5 - 14.5 % PLATELET 252 325 - 564 K/uL MPV 10.8 8.9 - 12.9 FL  
 NRBC 0.0 0  WBC ABSOLUTE NRBC 0.00 0.00 - 0.01 K/uL  NEUTROPHILS 84 (H) 32 - 75 % BAND NEUTROPHILS 1 % LYMPHOCYTES 13 12 - 49 % MONOCYTES 1 (L) 5 - 13 % EOSINOPHILS 1 0 - 7 % BASOPHILS 0 0 - 1 % IMMATURE GRANULOCYTES 0 0.0 - 0.5 % ABS. NEUTROPHILS 6.0 1.8 - 8.0 K/UL  
 ABS. LYMPHOCYTES 0.9 0.8 - 3.5 K/UL  
 ABS. MONOCYTES 0.1 0.0 - 1.0 K/UL  
 ABS. EOSINOPHILS 0.1 0.0 - 0.4 K/UL  
 ABS. BASOPHILS 0.0 0.0 - 0.1 K/UL  
 ABS. IMM. GRANS. 0.0 0.00 - 0.04 K/UL  
 DF MANUAL    
 RBC COMMENTS ANISOCYTOSIS 
1+ METABOLIC PANEL, COMPREHENSIVE Collection Time: 05/04/21  4:47 PM  
Result Value Ref Range Sodium 135 (L) 136 - 145 mmol/L Potassium 3.9 3.5 - 5.1 mmol/L Chloride 98 97 - 108 mmol/L  
 CO2 26 21 - 32 mmol/L Anion gap 11 5 - 15 mmol/L Glucose 89 65 - 100 mg/dL BUN 18 6 - 20 MG/DL Creatinine 1.09 0.70 - 1.30 MG/DL  
 BUN/Creatinine ratio 17 12 - 20 GFR est AA >60 >60 ml/min/1.73m2 GFR est non-AA >60 >60 ml/min/1.73m2 Calcium 8.6 8.5 - 10.1 MG/DL Bilirubin, total 1.4 (H) 0.2 - 1.0 MG/DL  
 ALT (SGPT) 44 12 - 78 U/L  
 AST (SGOT) 32 15 - 37 U/L Alk. phosphatase 124 (H) 45 - 117 U/L Protein, total 7.2 6.4 - 8.2 g/dL Albumin 3.3 (L) 3.5 - 5.0 g/dL Globulin 3.9 2.0 - 4.0 g/dL A-G Ratio 0.8 (L) 1.1 - 2.2 SED RATE (ESR) Collection Time: 05/04/21  4:47 PM  
Result Value Ref Range Sed rate, automated 62 (H) 0 - 15 mm/hr

## 2021-05-06 NOTE — PROGRESS NOTES
Podiatry Subjective: Patient admitted for chronic, draining wound plantar right foot. He has a Hx of amputations from diabetic infections. He knew this wound was not going to heal when he began to feel bone in the wound, and presented to the ED. He also relates a chronic Hx of reoccurring ulcers plantar right forefoot for years. Patient is a 36 y.o.  male who is being seen for osteomyelitis right foot. Patient Active Problem List  
 Diagnosis Date Noted  Diabetic foot infection [address] 2021  Ischemic cardiomyopathy 2019  Coronary artery disease involving native coronary artery of native heart without angina pectoris 2019  S/P PTCA (percutaneous transluminal coronary angioplasty) 2019  Hypertension 2019  Biventricular failure ([facility] 2019  [facility]-healing [facility] foot ([facility] 10/20/2013 Past Medical History:  
Diagnosis Date  Diabetes ([facility] Past Surgical History:  
Procedure Laterality Date  HX AMPUTATION  10/02/2012 Right 3rd toe Family History Problem Relation Age of Onset  Heart Disease Maternal Grandmother  Cancer Paternal Grandmother   
     Jorge Alberto Grijalva  Stroke Paternal Grandfather Social History Tobacco Use  Smoking status: Former Smoker Packs/day: 1.00 Years: 21.00 Pack years: 21.00 Types: Cigarettes Quit date: 2019 Years since quittin.4  Smokeless tobacco: Never Used Substance Use Topics  Alcohol use: No  
 
Current Facility-Administered Medications Medication Dose Route Frequency Provider Last Rate Last Admin  aspirin delayed-release tablet 81 mg  81 mg Oral DAILY Dino Linda MD   81 mg at 21 0814  
 atorvastatin (LIPITOR) tablet 40 mg  40 mg Oral QHS Dino Linda MD   40 mg at 21  carvediloL (COREG) tablet 12.5 mg  12.5 mg Oral BID WITH MEALS Bucky Suarez MD   12.5 mg at 21 9667  sodium chloride (NS) flush 5-40 mL  5-40 mL IntraVENous Q8H Paris uSarez MD   10 mL at 21 9584  sodium chloride (NS) flush 5-40 mL  5-40 mL IntraVENous PRN Paris Suarez MD      
 acetaminophen (TYLENOL) tablet 650 mg  650 mg Oral Q6H PRN Paris Suarez MD      
 Or  
 acetaminophen (TYLENOL) suppository 650 mg  650 mg Rectal Q6H PRN Paris Suarez MD      
 polyethylene glycol (MIRALAX) packet 17 g  17 g Oral DAILY PRN Paris Suarez MD      
 promethazine (PHENERGAN) tablet 12.5 mg  12.5 mg Oral Q6H PRN Paris Suarez MD      
 Or  
 ondansetron [facility]) injection 4 mg  4 mg IntraVENous Q6H PRN Paris Suarez MD      
 cefepime (MAXIPIME) 1 g in 0.9% sodium chloride (MBP/ADV) 50 mL MBP  1 g IntraVENous Q8H Paris Suarez  mL/hr at 21 1109 1 g at 21 1109  clindamycin (CLEOCIN) 600mg D5W 50mL IVPB (premix)  600 mg IntraVENous Q8H Paris Suarez  mL/hr at 21 1146 600 mg at 21 1146  
 glucose chewable tablet 16 g  4 Tab Oral PRN Xochilt Hubbard MD      
 dextrose (D50W) injection syrg 12.5-25 g  25-50 mL IntraVENous PRN Xochilt Hubbard MD   25 g at 21 1258  glucagon (GLUCAGEN) injection 1 mg  1 mg IntraMUSCular PRN Paris Suarez MD      
 insulin lispro (HUMALOG) injection   SubCUTAneous AC&HS Xochilt Hubbard MD   2 Units at 21 1119 Allergies Allergen Reactions  Eliquis [Apixaban] Hives Review of Systems:  AO x3. NAD. Objective:  
 
Patient Vitals for the past 8 hrs: 
 BP Temp Pulse Resp SpO2  
21 1221 133/86 97.6 °F (36.4 °C) 60 16 99 % 21 0803 125/72 97.5 °F (36.4 °C) 77 16 99 % Temp (24hrs), Av.2 °F (36.8 °C), Min:97.5 °F (36.4 °C), Max:98.7 °F (37.1 °C) Physical Exam Lower Extremities: There is local erythema and edema of the right forefoot. There is a dry eschar PMTPJ 1 right.  There is an open wound with exposed bone PMTPJ 4 right foot, with light purulent drainage. DP and PT 2/4 bilaterally. CFT less than 3 seconds Sensation absent to light touch bilateral feet. Amputation of the right  2nd toe, and multi-planar deformities of the remaining lesser toes. Plantar prominence of the right 1st MPJ. MRI: Possible osteomyelitis right 1st metatarsal head. Osteomyelitis of the entire right 3rd and 4th metatarsals, but not extending into the TMT joints. Lab Review:  
Recent Results (from the past 24 hour(s)) GLUCOSE, POC Collection Time: 05/05/21  4:32 PM  
Result Value Ref Range Glucose (POC) 131 (H) 65 - 100 mg/dL Performed by Andreia Lou (TRMACY RN) GLUCOSE, POC Collection Time: 05/05/21 10:51 PM  
Result Value Ref Range Glucose (POC) 103 (H) 65 - 100 mg/dL Performed by Samra Gregg (PCT) METABOLIC PANEL, BASIC Collection Time: 05/06/21  4:21 AM  
Result Value Ref Range Sodium 137 136 - 145 mmol/L Potassium 3.8 3.5 - 5.1 mmol/L Chloride 107 97 - 108 mmol/L  
 CO2 25 21 - 32 mmol/L Anion gap 5 5 - 15 mmol/L Glucose 97 65 - 100 mg/dL BUN 15 6 - 20 MG/DL Creatinine 0.78 0.70 - 1.30 MG/DL  
 BUN/Creatinine ratio 19 12 - 20 GFR est AA >60 >60 ml/min/1.73m2 GFR est non-AA >60 >60 ml/min/1.73m2 Calcium 8.7 8.5 - 10.1 MG/DL  
CBC W/O DIFF Collection Time: 05/06/21  4:21 AM  
Result Value Ref Range WBC 5.3 4.1 - 11.1 K/uL  
 RBC 4.18 4.10 - 5.70 M/uL  
 HGB 11.6 (L) 12.1 - 17.0 g/dL HCT 35.5 (L) 36.6 - 50.3 % MCV 84.9 80.0 - 99.0 FL  
 MCH 27.8 26.0 - 34.0 PG  
 MCHC 32.7 30.0 - 36.5 g/dL  
 RDW 13.2 11.5 - 14.5 % PLATELET 576 (L) 193 - 400 K/uL MPV 11.5 8.9 - 12.9 FL  
 NRBC 0.0 0  WBC ABSOLUTE NRBC 0.00 0.00 - 0.01 K/uL GLUCOSE, POC Collection Time: 05/06/21  6:39 AM  
Result Value Ref Range Glucose (POC) 103 (H) 65 - 100 mg/dL Performed by Vivian Zamora (PCT) GLUCOSE, POC  Collection Time: 05/06/21 10:51 AM  
Result Value Ref Range Glucose (POC) 207 (H) 65 - 100 mg/dL Performed by Ronna Mesa LPN   
GLUCOSE, POC Collection Time: 05/06/21  3:44 PM  
Result Value Ref Range Glucose (POC) 88 65 - 100 mg/dL Performed by Ansley Norman (TRV RN) Impression: 1. Ulcer to bone right foot 2. Osteomyelitis right 3rd and 4th metatarsals 3. Possible osteomyelitis right 1st MPJ Recommendation: I explained the MRI results to the patient. I again recommended a TMA right foot. He would still need an ID consult because it will not be possible to obtain clean margins on the 3rd and 4th metatarsals; rather I will implant antibiotic beads to assist with clearing the infection. I explained the risks of Sx, mostly the risk of continued infection and additional Sx. He understands and agrees to the procedure. He is scheduled for 5/7/21 at 5:30 pm. He may have breakfast that morning, but then NPO.

## 2021-05-06 NOTE — PROGRESS NOTES
Hospitalist Progress Note NAME: Coty Rogers :  1980 MRN:  [id number] Shi Chew is a 36 y.o.  male past history significant for ischemic cardiomyopathy with ejection fraction of 20%, type 2 diabetes, hypertension, presenting with complaints of worsening foot ulcer that he had discovered about a week ago and that it has started to become purulent with pus in the last day. Assessment / Plan: 
Right foot osteomyelitis -X-ray of right foot shows evidence of Charcot joint versus septic arthritis/osteomyelitis 
-MRI of the right foot reviewed, shows osteomyelitis of third and fourth metatarsal 
-Follow blood cultures that were sent in the ED along with wound cultures 
-Continue clindamycin and cefepime that was started by admitting MD 
-Podiatry on board, likely will need amputation Type 2 diabetes mellitus with hyperglycemia Last A1c is 6.1% SSI 
  
Systolic heart failure ejection fraction of 20% History of coronary artery disease cardiac MRI showing only viable RCA territory underwent PTCA stenting in  Hypertension 
-Continue aspirin, statin and Coreg 
-Holding home Plavix in anticipation for surgery History of left ventricular thrombus was on Eliquis for 6 months 
  
Code Status: Full code Surrogate Decision Maker: Wife information on record 
  
DVT Prophylaxis: SCDs, start heparin after surgery GI Prophylaxis: not indicated 
  
Baseline: Independent Body mass index is 24.11 kg/m². Subjective: Chief Complaint / Reason for Physician Visit No complaints, no nausea, no vomiting Review of Systems: 
Symptom Y/N Comments  Symptom Y/N Comments Fever/Chills    Chest Pain Poor Appetite    Edema Cough    Abdominal Pain Sputum    Joint Pain SOB/DANIELSON    Pruritis/Rash Nausea/vomit    Tolerating PT/OT Diarrhea    Tolerating Diet Constipation    Other Could NOT obtain due to:   
 
Objective: VITALS:  
Last 24hrs VS reviewed since prior progress note. Most recent are: 
Patient Vitals for the past 24 hrs: 
 Temp Pulse Resp BP SpO2  
05/06/21 0803 97.5 °F (36.4 °C) 77 16 125/72 99 % 05/06/21 0407 98.2 °F (36.8 °C) 75 14 132/65 99 % 05/05/21 2336 98.5 °F (36.9 °C) 75 14 130/69 99 % 05/05/21 2025 98.6 °F (37 °C) 76 14 (!) 141/90 98 % 05/05/21 1649 98.7 °F (37.1 °C) 66 14 110/76 97 % Intake/Output Summary (Last 24 hours) at 5/6/2021 2639 Last data filed at 5/6/2021 5176 Gross per 24 hour Intake 300 ml Output  Net 300 ml PHYSICAL EXAM: 
General: WD, WN. Alert, cooperative, no acute distress   
EENT:  EOMI. Anicteric sclerae. MMM Resp:  CTA bilaterally, no wheezing or rales. No accessory muscle use CV:  Regular  rhythm,  No edema GI:  Soft, Non distended, Non tender.  +Bowel sounds Neurologic:  Alert and oriented X 3, normal speech, Psych:   Not anxious nor agitated Skin:  Right foot erythema and tenderness present, previous amputation of toes Reviewed most current lab test results and cultures  YES Reviewed most current radiology test results   YES Review and summation of old records today    NO Reviewed patient's current orders and MAR    YES 
PMH/SH reviewed - no change compared to H&P Current Facility-Administered Medications:  
  aspirin delayed-release tablet 81 mg, 81 mg, Oral, DAILY, Raudel Suarez MD, 81 mg at 05/06/21 0814 
  atorvastatin (LIPITOR) tablet 40 mg, 40 mg, Oral, QHS, Raudel Suarez MD, 40 mg at 05/05/21 2106   carvediloL (COREG) tablet 12.5 mg, 12.5 mg, Oral, BID WITH MEALS, Raudel Suarez MD, 12.5 mg at 05/06/21 0814 
  sodium chloride (NS) flush 5-40 mL, 5-40 mL, IntraVENous, Q8H, Raudel Suarez MD, 10 mL at 05/06/21 9767   sodium chloride (NS) flush 5-40 mL, 5-40 mL, IntraVENous, PRN, Raudel Suarez MD 
  acetaminophen (TYLENOL) tablet 650 mg, 650 mg, Oral, Q6H PRN **OR** acetaminophen (TYLENOL) suppository 650 mg, 650 mg, Rectal, Q6H PRN, Paris Suarez MD 
  polyethylene glycol (MIRALAX) packet 17 g, 17 g, Oral, DAILY PRN, Paris Suarez MD 
  promethazine (PHENERGAN) tablet 12.5 mg, 12.5 mg, Oral, Q6H PRN **OR** ondansetron (ZOFRAN) injection 4 mg, 4 mg, IntraVENous, Q6H PRN, Paris Suarez MD 
  cefepime (MAXIPIME) 1 g in 0.9% sodium chloride (MBP/ADV) 50 mL MBP, 1 g, IntraVENous, Q8H, Paris Suarez MD, Last Rate: 100 mL/hr at 05/06/21 0424, 1 g at 05/06/21 0424   clindamycin (CLEOCIN) 600mg D5W 50mL IVPB (premix), 600 mg, IntraVENous, Q8H, Paris Suarez MD, Last Rate: 100 mL/hr at 05/06/21 0431, 600 mg at 05/06/21 0431 
  glucose chewable tablet 16 g, 4 Tab, Oral, PRN, Paris Suarez MD 
  dextrose (D50W) injection syrg 12.5-25 g, 25-50 mL, IntraVENous, PRN, Paris Suarez MD, 25 g at 05/05/21 1258   glucagon (GLUCAGEN) injection 1 mg, 1 mg, IntraMUSCular, PRN, Paris Suarez MD 
  insulin lispro (HUMALOG) injection, , SubCUTAneous, AC&HS, Paris Suarez MD, Stopped at 05/05/21 0730 
________________________________________________________________________ Care Plan discussed with: 
  Comments Patient y Family RN y   
Care Manager Consultant Multidiciplinary team rounds were held today with , nursing, pharmacist and clinical coordinator. Patient's plan of care was discussed; medications were reviewed and discharge planning was addressed. ________________________________________________________________________ Total NON critical care TIME: 35   Minutes Total CRITICAL CARE TIME Spent:   Minutes non procedure based Comments >50% of visit spent in counseling and coordination of care    
________________________________________________________________________ Jefferson Mata MD  
 
Procedures: see electronic medical records for all procedures/Xrays and details which were not copied into this note but were reviewed prior to creation of Plan. LABS: 
I reviewed today's most current labs and imaging studies. Pertinent labs include: 
Recent Labs 05/06/21 0421 05/04/21 
1647 WBC 5.3 7.1 HGB 11.6* 12.1 HCT 35.5* 36.3*  
* 155 Recent Labs 05/06/21 
0421 05/04/21 
1647  135* K 3.8 3.9  98 CO2 25 26 GLU 97 89 BUN 15 18 CREA 0.78 1.09  
CA 8.7 8.6 ALB  --  3.3* TBILI  --  1.4* ALT  --  44 Signed: Juvencio Lewis MD

## 2021-05-06 NOTE — PROGRESS NOTES
End of Shift Note Bedside shift change report given to Katie Bansal (oncoming nurse) by Andry Montaño RN (offgoing nurse). Report included the following information SBAR, Kardex and Recent Results Shift worked:  7p-7a Shift summary and any significant changes:  
  Uneventful shift. Pt rested well and had not complaints. Concerns for physician to address:  none Zone phone for oncoming shift:   [phone] Activity: 
Activity Level: Up ad marleny Number times ambulated in hallways past shift: 0 Number of times OOB to chair past shift: 0 Cardiac:  
Cardiac Monitoring: No     
  
 
Access:  
Current line(s): PIV Genitourinary:  
Urinary status: voiding Respiratory:  
O2 Device: None (Room air) Chronic home O2 use?: NO Incentive spirometer at bedside: NO 
  
GI: 
Last Bowel Movement Date: 05/05/21 Current diet:  DIET DIABETIC CONSISTENT CARB Regular Passing flatus: YES Tolerating current diet: YES Pain Management:  
Patient states pain is manageable on current regimen: YES Skin: 
Shukri Score: 21 Interventions: increase time out of bed Patient Safety: 
Fall Score: Total Score: 1 Interventions: gripper socks Length of Stay: 
Expected LOS: 2d 21h Actual LOS: 2 Andry Montaño, RN

## 2021-05-06 NOTE — PROGRESS NOTES
Transition of Care Plan: 
 
RUR: 9% Disposition: Home vs Home with [facility] Follow up appointments: Need New PCP in [place], Specialist 
DME needed: TBD Transportation at Discharge: Sister to provide transportation Keys or means to access home:  Sister has keys to house IM Medicare letter: N/A Caregiver Contact: Ricky [phone] Discharge Caregiver contacted prior to discharge? CM to contact at d/c. 
 
Reason for Admission:  Right foot osteomyelitis RUR Score:     9% Plan for utilizing home health:   TBD    
 
PCP: First and Last name:  None Name of Practice:  
 Are you a current patient: Yes/No:  
 Approximate date of last visit:  
 Can you participate in a virtual visit with your PCP:  
                 
Current Advanced Directive/Advance Care Plan: Full Code Advance Care Planning General Advance Care Planning (ACP) Conversation Date of Conversation: 05/6/21 Conducted with: Patient with Decision Making Capacity Healthcare Decision Maker:  
 
Click here to complete [address] including selection of the Healthcare Decision Maker Relationship (ie \"Primary\") Today we documented Decision Maker(s) consistent with Legal Next of Kin hierarchy. Content/Action Overview: DECLINED ACP conversation - will revisit periodically Reviewed DNR/DNI and patient elects Full Code (Attempt Resuscitation) Length of Voluntary ACP Conversation in minutes:  20 minutes Mary Beth Manzanares RN    
 
ealthcare Decision Maker:  
Click here to complete [address] including selection of the [address] Relationship (ie \"Primary\") Alvino Perry Transition of Care Plan:   
 
Home vs Home with [facility] 
 
CM introduced self to pt, verified pt demographics, insurance info and emergency contact. Pt lives with Father and Sister.  Pt independent with ADL's, ambulating and family provides transportation. No DME, HH or SNF in the past.   Uses [facility] pharmacy in [facility]. Care Management Interventions PCP Verified by CM: Yes Mode of Transport at Discharge: Other (see comment)(Sister to provide transportation ) Transition of Care Consult (CM Consult): Discharge Planning Current Support Network: Relative's Home(Lives with father and sister) Confirm Follow Up Transport: Family Lucretia Desouza [facility]  [facility] 
Phone: [phone]

## 2021-05-06 NOTE — PROGRESS NOTES
Problem: Falls - Risk of 
Goal: *Absence of Falls Description: Document Chung Sy Fall Risk and appropriate interventions in the flowsheet. Outcome: Progressing Towards Goal 
Note: Fall Risk Interventions: 
  
 
  
 
Medication Interventions: Teach patient to arise slowly Elimination Interventions: Call light in reach Problem: Patient Education: Go to Patient Education Activity Goal: Patient/Family Education Outcome: Progressing Towards Goal

## 2021-05-07 NOTE — ADT AUTH CERT NOTES
Osteomyelitis - Care Day 1 (5/4/2021) by Yoselyn Levine RN 
 
  
Review Status Review Entered Completed 5/5/2021 13:46  
  
Criteria Review Care Day: 1 Care Date: 5/4/2021 Level of Care: Inpatient Floor Guideline Day 1 Level Of Care (X) Floor 5/5/2021 13:42:00 EDT by Yolette Bejarano Clinical Status   
(X) * Clinical Indications met [B]   
5/5/2021 13:42:00 EDT by Marilynn Bravo   
  Diabetic foot infection Activity (X) As tolerated 5/5/2021 13:42:00 EDT by Marilynn Bravo   
  ACTIVITY AS TOLERATED WITH ASSISTANCE Routes (X) Parenteral medications 5/5/2021 13:42:30 EDT by Marilynn Bravo   
  atorvastatin (LIPITOR) tablet 40 mg  
Dose: 40 mg 
Freq: EVERY BEDTIME Route: PO   
(X) Diet as tolerated 5/5/2021 13:42:14 EDT by Marilynn Bravo   
  DIABETIC DIET Interventions (X) WBC, ESR, C-reactive protein 5/5/2021 13:43:47 EDT by Marilynn Bravo   
  5/4/2021 16:47 WBC: 7.1 Sed rate, automated: 62 (H) (X) Aspiration with Gram stain and culture 5/5/2021 13:45:02 EDT by Marilynn Bravo   
  PENDING WOUND CULTURE RESULTS   
(X) Blood culture 5/5/2021 13:44:39 EDT by Darci Pinto: NO GROWTH AFTER 16 HOURS   
(X) Possible MRI, CT scan, or radionuclide imaging 5/5/2021 13:45:29 EDT by Eladio Chavez RT FOOT: Significant progression of erosion of the first MTP joint. Charcot 
joint is slightly favored over septic arthritis and osteomyelitis. An ulcer over 
the first MTP joint would make septic arthritis more likely. Medications (X) Parenteral antibiotics 5/5/2021 13:43:17 EDT by Marilynn Bravo   
  vancomycin (VANCOCIN) 1,000 mg in 0.9% sodium chloride 250 mL (VIAL-MATE) Dose: 1,000 mg Freq: EVERY 12 HOURS Route: IV Start: 05/05/21 0300 End: 05/05/21 0650   
5/5/2021 13:42:59 EDT by Marilynn Bravo   
  clindamycin (CLEOCIN) 600mg D5W 50mL IVPB (premix) Dose: 600 mg Freq: EVERY 8 HOURS Route: IV   
5/5/2021 13:42:38 EDT by Soraya Lazo, Anise   
  cefepime (MAXIPIME) 1 g in 0.9% sodium chloride (MBP/ADV) 50 mL MBP Dose: 1 g 
Freq: EVERY 8 HOURS Route: IV   
5/5/2021 13:46:48 EDT by Danilo Terry Subject: Additional Clinical Information * VS: T 98.7, P 66, R 16, /94, SPO2 99% RA   
 
* SCD * Milestone Additional Notes ED ROS:  
Skin: Positive for wound. ED PHYSICAL EXAM:  
Skin - normal coloration and turgor, no rashes, 2 ulcers noted on the plantar aspect of the right foot, 1 at the base of the second toe and one at the base of the fifth toe, there is erythema and swelling around the ulcer at the base of the fifth toe and, with compression of tissues around the wound, pus is expressed. Mil Lapping is mild tenderness over the lateral aspect of the right foot ED Clinical Impression: 1. Diabetic foot ulcer with osteomyelitis ([address] 2. Cellulitis of foot   
   
  
INTERNAL MEDICINE:  
Assessment / Plan:  
   
Infected right diabetic foot History of right third toe amputation  
-Admit to surgical pablo  
-Cultures collected and will start on cefepime, clindamycin and vancomycin  
-MRI of the foot  
-Podiatry consultation  
   
   
Type 2 diabetes Last A1c is 6.1% No reported diabetic medications  
-We will keep on sliding scale  
   
Systolic heart failure ejection fraction of 20%  
three-vessel disease cardiac MRI showing only viable RCA territory underwent PTCA stenting in 12/19 Hypertension History of left ventricular thrombus was on Eliquis for 6 months  
   
-We will continue with beta-blocker, statin, aspirin  
-We will hold off clopidogrel and ARB in preparation for surgery    
   
   
   
   
Code Status: Full code Surrogate Decision Maker: Wife information on record  
   
DVT Prophylaxis: SCDs GI Prophylaxis: not indicated  
   
Baseline: Independent 5/4/2021 16:47 HCT: 36.3 (L) NEUTROPHILS: 84 (H) MONOCYTES: 1 (L) Sed rate, automated: 62 (H)  
  
  
  
5/4/2021 16:47 Sodium: 135 (L) Bilirubin, total: 1.4 (H) Albumin: 3.3 (L) A-G Ratio: 0.8 (L) Alk. phosphatase: 124 (H)  
  
  
  
Osteomyelitis - Clinical Indications for Admission to Inpatient Care by Brenda Nelson RN 
 
  
Review Status Review Entered Completed 5/5/2021 13:40  
  
Criteria Review Clinical Indications for Admission to Inpatient Care Most Recent : Brennan Ozuna Most Recent Date: 5/5/2021 13:40:15 EDT   
(X) Admission is indicated by  1 or more  of the following  (1) (2) (3) (4):   
   (X) Appropriate monitoring and therapy (IV antibiotics) cannot be immediately arranged for home   
   or outpatient setting   
   5/5/2021 13:40:15 EDT by Jan, [address] [place] [address] Notes:   
5/5/2021 13:40:15 EDT by Brennan Ozuna Subject: Additional Clinical Information HPI: Julita Gonzalez, 36 y.o. male with PMHx significant for coronary artery disease, type 2 diabetes, diabetic neuropathy, previous right foot osteomyelitis requiring amputation of third toe and status post amputation of second through fourth metatarsals, presents to the ED as a transfer from [address] [place] [address] ED for osteomyelitis and infected diabetic ulcer of right foot.  Patient reports that he frequently develops ulcers on the bottom of his right foot.  He usually does wound care and they resolve, but an ulcer formed a few days ago that has gotten progressively larger and today he noticed pus draining from the wound.  He presented to [address] [place] [address] ED where he had lab work that showed a normal white count, but x-ray showed osteomyelitis versus Charcot foot with erosion of the first MTP joint.  Patient also noted to have pus draining from an ulcer that over lies plantar surface of foot at the base of the fifth toe.  Attempted to admit at [address] [place] [address], but surgeon there requested that patient be transferred to [facility] [address] for further management.  Patient reports he has minimal sensation of his bilateral feet.  Does not have significant pain at this time.

## 2021-05-07 NOTE — ANESTHESIA POSTPROCEDURE EVALUATION
Procedure(s): 
AMPUTATION RIGHT TRANSMETATARSAL. MAC, total IV anesthesia Anesthesia Post Evaluation Patient location during evaluation: PACU Note status: Adequate. Level of consciousness: responsive to verbal stimuli and sleepy but conscious Pain management: satisfactory to patient Airway patency: patent Anesthetic complications: no 
Cardiovascular status: acceptable Respiratory status: acceptable Hydration status: acceptable Comments: +Post-Anesthesia Evaluation and Assessment Patient: Tha German MRN: [id number]  SSN: [id number] YOB: 1980  Age: 36 y.o. Sex: male Cardiovascular Function/Vital Signs /77   Pulse (!) 54   Temp 36.6 °C (97.8 °F)   Resp 12   Ht 6' 1\" (1.854 m)   Wt 82.9 kg (182 lb 12.2 oz)   SpO2 100%   BMI 24.11 kg/m² Patient is status post Procedure(s): 
AMPUTATION RIGHT TRANSMETATARSAL. Nausea/Vomiting: Controlled. Postoperative hydration reviewed and adequate. Pain: 
Pain Scale 1: Numeric (0 - 10) (05/07/21 1624) Pain Intensity 1: 0 (05/07/21 1624) Managed. Neurological Status:  
Neuro (WDL): Within Defined Limits (05/07/21 1630) At baseline. Mental Status and Level of Consciousness: Arousable. Pulmonary Status:  
O2 Device: Nasal cannula (05/07/21 1942) Adequate oxygenation and airway patent. Complications related to anesthesia: None Post-anesthesia assessment completed. No concerns. Signed By: Wes Carreon MD  
 5/7/2021 Post anesthesia nausea and vomiting:  controlled INITIAL Post-op Vital signs:  
Vitals Value Taken Time /77 05/07/21 1942 Temp 36.6 °C (97.8 °F) 05/07/21 1942 Pulse 49 05/07/21 1944 Resp 16 05/07/21 1944 SpO2 100 % 05/07/21 1944 Vitals shown include unvalidated device data.

## 2021-05-07 NOTE — PERIOP NOTES
TRANSFER - IN REPORT: 
 
Verbal report received from Luis Calle(name) on [employer]  being received from Surgical Telemetry(unit) for ordered procedure Report consisted of patients Situation, Background, Assessment and  
Recommendations(SBAR). Information from the following report(s) SBAR, Kardex, Intake/Output, MAR and Recent Results was reviewed with the receiving nurse. Opportunity for questions and clarification was provided. Assessment completed upon patients arrival to unit and care assumed.

## 2021-05-07 NOTE — PERIOP NOTES
Handoff Report from Operating Room to PACU Report received from [address]  and Emerson Led CRNA regarding Mavis Cones. Surgeon(s): 
Karma Land DPM  And Procedure(s) (LRB): 
AMPUTATION RIGHT TRANSMETATARSAL (Right)  confirmed  
with allergies and dressings discussed. Anesthesia type, drugs, patient history, complications, estimated blood loss, vital signs, intake and output, and last pain medication were reviewed.

## 2021-05-07 NOTE — PROGRESS NOTES
Hospitalist Progress Note NAME: Annelise Menjivar :  1980 MRN:  [id number] Reinaldo Simon is a 36 y.o.  male past history significant for ischemic cardiomyopathy with ejection fraction of 20%, type 2 diabetes, hypertension, presenting with complaints of worsening foot ulcer that he had discovered about a week ago and that it has started to become purulent with pus in the last day. Assessment / Plan: 
Right foot osteomyelitis POA 
-X-ray of right foot shows evidence of Charcot joint versus septic arthritis/osteomyelitis 
-MRI of the right foot reviewed, shows osteomyelitis of third and fourth metatarsal 
-Follow blood cultures that were sent in the ED along with wound cultures 
-Continue clindamycin and cefepime that was started by admitting MD 
 
-Plan for right foot TMA today 
-Consult ID Type 2 diabetes mellitus with hyperglycemia Last A1c is 6.1% SSI 
  
Systolic heart failure ejection fraction of 20% History of coronary artery disease cardiac MRI showing only viable RCA territory underwent PTCA stenting in  Hypertension 
-Continue aspirin, statin and Coreg, lasix 
-Holding home Plavix in anticipation for surgery 
-Resume Plavix when okay with podiatry History of left ventricular thrombus was on Eliquis for 6 months 
  
Code Status: Full code Surrogate Decision Maker: Wife information on record 
  
DVT Prophylaxis: SCDs, start heparin after surgery GI Prophylaxis: not indicated 
  
Baseline: Independent Body mass index is 24.11 kg/m². Subjective: Chief Complaint / Reason for Physician Visit No complaints, no nausea, no vomiting Review of Systems: 
Symptom Y/N Comments  Symptom Y/N Comments Fever/Chills n   Chest Pain n   
Poor Appetite    Edema n   
Cough n   Abdominal Pain n   
Sputum    Joint Pain SOB/DANIELSON    Pruritis/Rash Nausea/vomit    Tolerating PT/OT Diarrhea    Tolerating Diet y Constipation    Other Could NOT obtain due to:   
 
Objective: VITALS:  
Last 24hrs VS reviewed since prior progress note. Most recent are: 
Patient Vitals for the past 24 hrs: 
 Temp Pulse Resp BP SpO2  
05/07/21 0800 98.6 °F (37 °C) 60 16 (!) 151/91 100 % 05/07/21 0350 98.3 °F (36.8 °C) 62 16 (!) 159/83 100 % 05/07/21 0015 98.3 °F (36.8 °C) 63 16 (!) 156/90 96 % 05/06/21 2004 98.7 °F (37.1 °C) (!) 57 16 (!) 160/94 100 % 05/06/21 1614 97.3 °F (36.3 °C) 65 16 (!) 145/81 99 % 05/06/21 1221 97.6 °F (36.4 °C) 60 16 133/86 99 % Intake/Output Summary (Last 24 hours) at 5/7/2021 1013 Last data filed at 5/6/2021 2004 Gross per 24 hour Intake 490 ml Output  Net 490 ml PHYSICAL EXAM: 
General: WD, WN. Alert, cooperative, no acute distress   
EENT:  EOMI. Anicteric sclerae. MMM Resp:  CTA bilaterally, no wheezing or rales. No accessory muscle use CV:  Regular  rhythm,  No edema GI:  Soft, Non distended, Non tender.  +Bowel sounds Neurologic:  Alert and oriented X 3, normal speech, Psych:   Not anxious nor agitated Skin:  Right foot erythema and tenderness present, previous amputation of toes Reviewed most current lab test results and cultures  YES Reviewed most current radiology test results   YES Review and summation of old records today    NO Reviewed patient's current orders and MAR    YES 
PMH/SH reviewed - no change compared to H&P Current Facility-Administered Medications:  
  aspirin delayed-release tablet 81 mg, 81 mg, Oral, DAILY, Anel Suarez MD, 81 mg at 05/07/21 0814 
  atorvastatin (LIPITOR) tablet 40 mg, 40 mg, Oral, QHS, Anel Suarez MD, 40 mg at 05/06/21 2224   carvediloL (COREG) tablet 12.5 mg, 12.5 mg, Oral, BID WITH MEALS, Anel Suarez MD, 12.5 mg at 05/07/21 0814 
  sodium chloride (NS) flush 5-40 mL, 5-40 mL, IntraVENous, Q8H, Anel Suarez MD, 10 mL at 05/07/21 4173   sodium chloride (NS) flush 5-40 mL, 5-40 mL, IntraVENous, PRN, Mayra Suarez MD 
  acetaminophen (TYLENOL) tablet 650 mg, 650 mg, Oral, Q6H PRN **OR** acetaminophen (TYLENOL) suppository 650 mg, 650 mg, Rectal, Q6H PRN, Mayra Suarez MD 
  polyethylene glycol (MIRALAX) packet 17 g, 17 g, Oral, DAILY PRN, Mayra Suarez MD 
  promethazine (PHENERGAN) tablet 12.5 mg, 12.5 mg, Oral, Q6H PRN **OR** ondansetron (ZOFRAN) injection 4 mg, 4 mg, IntraVENous, Q6H PRN, Mayra Suarez MD 
  cefepime (MAXIPIME) 1 g in 0.9% sodium chloride (MBP/ADV) 50 mL MBP, 1 g, IntraVENous, Q8H, Mayra Suarez MD, Last Rate: 100 mL/hr at 05/07/21 0342, 1 g at 05/07/21 0342   clindamycin (CLEOCIN) 600mg D5W 50mL IVPB (premix), 600 mg, IntraVENous, Q8H, Mayra Suarez MD, Last Rate: 100 mL/hr at 05/07/21 0342, 600 mg at 05/07/21 0342 
  glucose chewable tablet 16 g, 4 Tab, Oral, PRN, Mayra Suarez MD 
  dextrose (D50W) injection syrg 12.5-25 g, 25-50 mL, IntraVENous, PRN, Mayra Suarez MD, 25 g at 05/05/21 1258   glucagon (GLUCAGEN) injection 1 mg, 1 mg, IntraMUSCular, PRN, Mayra Suarez MD 
  insulin lispro (HUMALOG) injection, , SubCUTAneous, AC&HS, Mayra Suarez MD, Stopped at 05/06/21 1630 
________________________________________________________________________ Care Plan discussed with: 
  Comments Patient y Family RN y   
Care Manager Consultant Multidiciplinary team rounds were held today with , nursing, pharmacist and clinical coordinator. Patient's plan of care was discussed; medications were reviewed and discharge planning was addressed. ________________________________________________________________________ Total NON critical care TIME: 35   Minutes Total CRITICAL CARE TIME Spent:   Minutes non procedure based Comments >50% of visit spent in counseling and coordination of care ________________________________________________________________________ Fela Odonnell MD  
 
Procedures: see electronic medical records for all procedures/Xrays and details which were not copied into this note but were reviewed prior to creation of Plan. LABS: 
I reviewed today's most current labs and imaging studies. Pertinent labs include: 
Recent Labs 05/06/21 
0421 05/04/21 
1647 WBC 5.3 7.1 HGB 11.6* 12.1 HCT 35.5* 36.3*  
* 155 Recent Labs 05/07/21 
0340 05/06/21 0421 05/04/21 
1647  137 135* K 4.0 3.8 3.9 * 107 98 CO2 25 25 26 GLU 83 97 89 BUN 16 15 18 CREA 0.81 0.78 1.09  
CA 8.8 8.7 8.6 ALB  --   --  3.3* TBILI  --   --  1.4* ALT  --   --  44 Signed: Fela Odonnell MD

## 2021-05-07 NOTE — ANESTHESIA PREPROCEDURE EVALUATION
Relevant Problems CARDIOVASCULAR  
(+) Coronary artery disease involving native coronary artery of native heart without angina pectoris  
(+) Hypertension Anesthetic History No history of anesthetic complications Review of Systems / Medical History Patient summary reviewed, nursing notes reviewed and pertinent labs reviewed Pulmonary Comments: Former smoker - Quit 2019 - 21 pack years Neuro/Psych Within defined limits Cardiovascular Hypertension Valvular problems/murmurs: mitral insufficiency CHF 
 
CAD and hyperlipidemia Comments: Ischemic cardiomyopathy Biventricular failure ECG (7/13/20): Sinus  Rhythm Left atrial enlargement. Nonspecific T-abnormality. TTE (5/11/20): ·Normal cavity size, wall thickness and diastolic function. Moderate systolic function. Estimated left ventricular ejection fraction is 35 - 40%. Hypokinesis of the apex, apical anterior, apical lateral, and mid to basal anterolateral walls. Wava Prim ·Moderately dilated left atrium. ·Mild mitral valve regurgitation is present. ·Severe pulmonary hypertension. Pulmonary arterial systolic pressure is 71 mmHg. GI/Hepatic/Renal 
  
 
 
 
 
 
 Endo/Other Diabetes: type 2 Other Findings Comments: Diabetic foot infection/ 
Osteomyelitis of right foot Physical Exam 
 
Airway Mallampati: III 
TM Distance: > 6 cm Neck ROM: normal range of motion Mouth opening: Normal 
 
 Cardiovascular Regular rate and rhythm,  S1 and S2 normal,  no murmur, click, rub, or gallop Dental 
 
Dentition: Poor dentition Comments: No loose teeth, significant decay. Pulmonary Breath sounds clear to auscultation Abdominal 
GI exam deferred Other Findings Anesthetic Plan ASA: 3 Anesthesia type: MAC and total IV anesthesia Induction: Intravenous Anesthetic plan and risks discussed with: Patient

## 2021-05-07 NOTE — PROGRESS NOTES
Problem: Falls - Risk of 
Goal: *Absence of Falls Description: Document Cathychiqui Hutchinsonin Fall Risk and appropriate interventions in the flowsheet. Outcome: Progressing Towards Goal 
Note: Fall Risk Interventions: 
  
 
  
 
Medication Interventions: Teach patient to arise slowly Elimination Interventions: Call light in reach Problem: Patient Education: Go to Patient Education Activity Goal: Patient/Family Education Outcome: Progressing Towards Goal

## 2021-05-07 NOTE — PROGRESS NOTES
End of Shift Note Bedside shift change report given to KEYANA Calle (oncoming nurse) by Dipika Nichols RN (offgoing nurse). Report included the following information SBAR, Kardex and Recent Results Shift worked:  7p-7a Shift summary and any significant changes:  
  Uneventful shift. Pt had no complaints. Will be NPO at 0730 after breakfast for afternoon surgery. Concerns for physician to address:  none Zone phone for oncoming shift:   [phone] Activity: 
Activity Level: Up ad marleny Number times ambulated in hallways past shift: 0 Number of times OOB to chair past shift: 0 Cardiac:  
Cardiac Monitoring: No     
  
 
Access:  
Current line(s): PIV Genitourinary:  
Urinary status: voiding Respiratory:  
O2 Device: None (Room air) Chronic home O2 use?: NO Incentive spirometer at bedside: NO 
  
GI: 
Last Bowel Movement Date: 05/05/21 Current diet:  DIET DIABETIC CONSISTENT CARB Regular DIET NPO Except Meds Passing flatus: YES Tolerating current diet: YES Pain Management:  
Patient states pain is manageable on current regimen: N/A Skin: 
Shukri Score: 21 Interventions: increase time out of bed Patient Safety: 
Fall Score: Total Score: 0 Interventions: gripper socks Length of Stay: 
Expected LOS: 2d 21h Actual LOS: 3 Dipika Nichols RN

## 2021-05-07 NOTE — CONSULTS
ID  
 Pt seen & examined Diabetic foot ulcer with OM Chronic draining plantar wound Continue broad spectrum antibiotics Intra op Cultures- Aerobic, anaerobic, fungal , AFB If pus caseous or cheesy request Micro to check for Actinomycosis, Nocardia DARIO  
 D/w Dr Joce Morel.

## 2021-05-08 NOTE — PROGRESS NOTES
Pharmacy Automatic Renal Dosing Protocol - Antimicrobials Indication for Antimicrobials:   Osteomyelitis- s/p right third toe amputation (in ) Current Regimen of Each Antimicrobial: 
Cefepime 1gm IV q8h         (start date ; Day #4) Metronidazole 500mg IV q12h (start Date ; Day #2) Vancomycin- pharmacy to Dose  1750mg IVx1 then 1250mg IV q8h (Start Date ; Day #2) Previous Antimicrobial Therapy: 
Zosyn 3.375gm IV x1 21 Vancomycin 1500mg x1 then 1000mg IV q12h - Pharmacy to dose (Start Date  ; Day # 2 of 7) Clindamycin 600mg IV q8h (start date ; Day #2) Goal Level: VANCOMYCIN TROUGH GOAL RANGE Vancomycin Trough: 15 - 20 mcg/mL  (AUC: 400 - 600 mg/hr/Liter/day) Date Dose & Interval Measured (mcg/mL) Extrapolated (mcg/mL) / AUC Date & time of next level:  ~  at 0800 Significant Cultures:  
 Blood-  NG- Prelim  Wound- NG- Prelim. Radiology / Imaging results: (X-ray, CT scan or MRI):  
  MRI of foot IMPRESSION 1. Findings concerning for osteomyelitis of the third and fourth metatarsals 
with associated ulcerations and sinus tracts extending to the plantar surface of 
the foot 2. At least reactive marrow changes of the first metatarsal head. Early 
osteomyelitis is difficult to exclude. There is a shallow plantar ulceration but 
no definite sinus tract or abscess Paralysis, amputations, malnutrition: Right 3rd toe amputation  (remote past) Labs: 
Recent Labs 21 
8813 21 
0340 21 
0421 CREA 0.82 0.81 0.78 BUN 13 16 15 WBC 5.0  --  5.3 Temp (24hrs), Av.2 °F (36.8 °C), Min:97.8 °F (36.6 °C), Max:98.7 °F (37.1 °C) Is the Patient on Dialysis? No 
 
Creatinine Clearance (mL/min):  
CrCl (Actual Body Weight): 140.4 CrCl (Adjusted Body Weight): 137.4 CrCl (Ideal Body Weight): 135.3 Impression/Plan:  
Continue Cefepime;   Clindamycin  changed to Metronidazole Vancomycin  restarted- pharmacy to dose  (last given 5/5 @ 04:50) Vanco 1750mg IV load x1 then 1250mg IV q8h for predicted Trough 15 and  Vancomycin trough, Sunday, 5/9 @ 0800 Added BMP daily x3, CBC QOD Antimicrobial stop date -tbd Pharmacy will follow daily and adjust medications as appropriate for renal function and/or serum levels. Thank you, HOSSEIN Rayo

## 2021-05-08 NOTE — OP NOTES
[address] 
OPERATIVE REPORT Name:  Monique Estrella 
MR#:  [id number] :  1980 ACCOUNT #:  [de-identified] DATE OF SERVICE:  2021 PREOPERATIVE DIAGNOSIS:  Osteomyelitis, right foot. POSTOPERATIVE DIAGNOSIS:  Osteomyelitis, right foot. PROCEDURE PERFORMED:  Transmetatarsal amputation, right foot. SURGEON:  Mamie Bowden DPM 
 
ASSISTANT:  Not applicable. ANESTHESIA:  IV sedation with right ankle block. COMPLICATIONS:  None. SPECIMENS REMOVED: 
1. Aerobic and anaerobic cultures. 2.  Fungal cultures. 3.  Acid fast culture and smear. 4.  Osteomyelitis, metatarsals 1, 3, and 4, right foot as part of TMA. 5.  Bone margin, right third metatarsal. 
6.  Bone margin, right fourth metatarsal. 
 
IMPLANTS:  Stimulan absorbable beads with vancomycin powder. ESTIMATED BLOOD LOSS:  200 mL. PROCEDURE IN DETAIL:  The patient was brought into the operating room and placed supine upon the OR table. After administration of IV sedation, I performed a right ankle block utilizing 19 mL of 0.5% plain Marcaine. The foot was prepped and draped in the usual sterile technique. A time-out was observed. The foot was elevated for three minutes before the inflation of a tourniquet around the right ankle to a pressure of 250 mmHg. A fishmouth-shaped incision was made beginning at the medial aspect of the right first metatarsal and transversing along the plantar metatarsal heads until it reached the lateral aspect of the right fifth metatarsal.  This was then continued in a curvilinear fashion across the dorsum of the foot at approximately the midshaft of the metatarsals until joining with the start of the incision at the medial right first metatarsal.  Incision was carried sharply down to bone with a #15 scalpel.   A Key elevator was used to retract the periosteum from the dorsal metatarsal shafts and a power micro sagittal saw was used to resect metatarsal shafts, 1 through 5, at approximately the midshaft level. A #15 blade was used to release all soft tissue attachments to the right metatarsals, 1 through 5, and along with the associated toes, this was released from the right foot. On the back table, a rongeur was used to take bone samples from the right fourth metatarsal neck, not directly at the surgical wound but deeper in the foot, and these were sent for the above-mentioned cultures. The remainder of the forefoot was sent to pathology as a single specimen. Next, a rongeur was used to take samples from the remaining distal portions of the right third and right fourth metatarsals, and these will be sent separately to pathology as bone margins. An oval rotary bur was used to round the edges of the resected metatarsals and after that the same bur was used to ream out the center of metatarsals 3 and 4. This was further cleared of debris with a curette and flushed with sterile saline. The shafts of metatarsals 3 and 4 were then packed with Stimulan absorbable beads into which 1 g of vancomycin had been added into the mix. Only approximately 4-5 mL of the 25-mm kit were used in this fashion and the rest of the absorbable beads were discarded. The foot was then flushed with sterile irrigation and the tourniquet was released. There were several bleeding vessels that were ligated with 3-0 Vicryl. The skin was held into approximation and then sutured closed utilizing 3-0 Vicryl for closure of the subcutaneous tissue and 4-0 nylon for closure of the skin. The foot was then cleaned and dried and wrapped with dry sterile dressings, over the surgical incision was placed Adaptic. The patient was then transported to the recovery room with vital signs stable and vascular status intact. He will remain until stable for transfer back up to his hospital bed. I will continue to follow up with the patient while he remains in-house.  
 
 
 
Shawanda Jo DPM 
 
 [id number] 
D:  05/07/2021 19:49 
T:  05/07/2021 20:39 
JOB #:  [id number]

## 2021-05-08 NOTE — PROGRESS NOTES
Hospitalist Progress Note NAME: Lewis Child :  1980 MRN:  [id number] Ramonita Arroyo is a 36 y.o.  male past history significant for ischemic cardiomyopathy with ejection fraction of 20%, type 2 diabetes, hypertension, presenting with complaints of worsening foot ulcer that he had discovered about a week ago and that it has started to become purulent with pus in the last day. Assessment / Plan: 
Right foot osteomyelitis POA 
-X-ray of right foot shows evidence of Charcot joint versus septic arthritis/osteomyelitis 
-MRI of the right foot reviewed, shows osteomyelitis of third and fourth metatarsal 
-Follow blood cultures that were sent in the ED along with wound cultures 
-Continue clindamycin and cefepime that was started by admitting MD 
 
-Plan for right foot TMA today 
-Consult ID 
 
: 
Pt s/p TMA R foot yesterday Podiatry evals on going ID evals on going Continue with IV abx Await intra-op cx results Type 2 diabetes mellitus with hyperglycemia Last A1c is 6.1% SSI 
  
Systolic heart failure ejection fraction of 20% History of coronary artery disease cardiac MRI showing only viable RCA territory underwent PTCA stenting in  Hypertension 
-Continue aspirin, statin and Coreg, lasix 
-Holding home Plavix in anticipation for surgery and restart when cleared by Podiatry History of left ventricular thrombus was on Eliquis for 6 months 
  
Code Status: Full code Surrogate Decision Maker: Wife information on record 
  
DVT Prophylaxis: SCDs - continue for now GI Prophylaxis: not indicated 
  
Baseline: Independent Body mass index is 24.11 kg/m². Subjective: Chief Complaint / Reason for Physician Visit No complaints, no nausea, no vomiting. Did have some bleeding on his bandage when he stepped using his right foot. Review of Systems: 
Symptom Y/N Comments  Symptom Y/N Comments Fever/Chills n   Chest Pain n   
Poor Appetite    Edema n Cough n   Abdominal Pain n   
Sputum    Joint Pain SOB/DANIELSON    Pruritis/Rash Nausea/vomit    Tolerating PT/OT Diarrhea    Tolerating Diet y Constipation    Other Could NOT obtain due to:   
 
Objective: VITALS:  
Last 24hrs VS reviewed since prior progress note. Most recent are: 
Patient Vitals for the past 24 hrs: 
 Temp Pulse Resp BP SpO2  
05/08/21 0852 98.5 °F (36.9 °C) 80 16 (!) 160/84 98 % 05/08/21 0410 98.3 °F (36.8 °C) 67 18 (!) 145/88 99 % 05/07/21 2255 98.1 °F (36.7 °C) (!) 56 20 135/85 96 % 05/07/21 2030 97.8 °F (36.6 °C) (!) 52 18 (!) 148/84 97 % 05/07/21 2029  (!) 52 16  98 % 05/07/21 2015  (!) 51 14 (!) 144/86 98 % 05/07/21 2000 97.8 °F (36.6 °C) (!) 57 11 (!) 141/88 98 % 05/07/21 1955  (!) 54 15 136/83 99 % 05/07/21 1950  (!) 46 16 123/75 99 % 05/07/21 1945  (!) 49 14 134/79 99 % 05/07/21 1942 97.8 °F (36.6 °C) (!) 54 12 137/77 100 % 05/07/21 1624 98.5 °F (36.9 °C) 69 18 (!) 163/98 99 % 05/07/21 1525 98.2 °F (36.8 °C) 61 18 (!) 156/95 99 % 05/07/21 1416 97.7 °F (36.5 °C) 62 20 (!) 147/64  Intake/Output Summary (Last 24 hours) at 5/8/2021 1129 Last data filed at 5/8/2021 3074 Gross per 24 hour Intake 800 ml Output 800 ml Net 0 ml PHYSICAL EXAM: 
General: WD, WN. Alert, cooperative, no acute distress   
EENT:  EOMI. Anicteric sclerae. MMM Resp:  CTA bilaterally, no wheezing or rales. No accessory muscle use CV:  Regular  rhythm,  No edema GI:  Soft, Non distended, Non tender.  +Bowel sounds Neurologic:  Alert and oriented X 3, normal speech, Psych:   Not anxious nor agitated Skin:  Right foot wrapped Reviewed most current lab test results and cultures  YES Reviewed most current radiology test results   YES Review and summation of old records today    NO Reviewed patient's current orders and MAR    YES 
PMH/SH reviewed - no change compared to H&P Current Facility-Administered Medications:  metroNIDAZOLE (FLAGYL) IVPB premix 500 mg, 500 mg, IntraVENous, Q12H, Kathy Land DPM, Last Rate: 100 mL/hr at 05/08/21 0203, 500 mg at 05/08/21 0203   furosemide (LASIX) tablet 20 mg, 20 mg, Oral, DAILY, Kathy Land DPM, 20 mg at 05/08/21 1008   vancomycin (VANCOCIN) 1250 mg in  ml infusion, 1,250 mg, IntraVENous, Q8H, Kathy Land DPM, Last Rate: 125 mL/hr at 05/08/21 1008, 1,250 mg at 05/08/21 1008   oxyCODONE IR (ROXICODONE) tablet 5 mg, 5 mg, Oral, Q4H PRN, Kathy Land DPM 
  aspirin delayed-release tablet 81 mg, 81 mg, Oral, DAILY, Kathy Land DPM, 81 mg at 05/08/21 1008   atorvastatin (LIPITOR) tablet 40 mg, 40 mg, Oral, QHS, Kathy Land DPM, 40 mg at 05/07/21 2310   carvediloL (COREG) tablet 12.5 mg, 12.5 mg, Oral, BID WITH MEALS, Kathy Land DPM, 12.5 mg at 05/08/21 1008   sodium chloride (NS) flush 5-40 mL, 5-40 mL, IntraVENous, Q8H, Ernesto Land DPM, 10 mL at 05/08/21 0517 
  sodium chloride (NS) flush 5-40 mL, 5-40 mL, IntraVENous, PRN, Kathy Land DPM 
  acetaminophen (TYLENOL) tablet 650 mg, 650 mg, Oral, Q6H PRN **OR** acetaminophen (TYLENOL) suppository 650 mg, 650 mg, Rectal, Q6H PRN, Kathy Land DPM 
  polyethylene glycol (MIRALAX) packet 17 g, 17 g, Oral, DAILY PRN, Kathy Land DPM 
  promethazine (PHENERGAN) tablet 12.5 mg, 12.5 mg, Oral, Q6H PRN **OR** ondansetron (ZOFRAN) injection 4 mg, 4 mg, IntraVENous, Q6H PRN, Kathy Land DPM 
  cefepime (MAXIPIME) 1 g in 0.9% sodium chloride (MBP/ADV) 50 mL MBP, 1 g, IntraVENous, Q8H, Kathy Land DPM, Last Rate: 100 mL/hr at 05/08/21 0517, 1 g at 05/08/21 0517 
  glucose chewable tablet 16 g, 4 Tab, Oral, PRN, Kathy Land DPM 
  dextrose (D50W) injection syrg 12.5-25 g, 25-50 mL, IntraVENous, PRN, Kathy Land DPM, 25 g at 05/05/21 1258   glucagon (GLUCAGEN) injection 1 mg, 1 mg, IntraMUSCular, PRN, Kathy Land DPM 
  insulin lispro (HUMALOG) injection, , SubCUTAneous, AC&HS, Nusrat Land DPM, Stopped at 05/06/21 1630 
________________________________________________________________________ Care Plan discussed with: 
  Comments Patient y Family RN y   
Care Manager Consultant Multidiciplinary team rounds were held today with , nursing, pharmacist and clinical coordinator. Patient's plan of care was discussed; medications were reviewed and discharge planning was addressed. ________________________________________________________________________ Total NON critical care TIME: 35   Minutes Total CRITICAL CARE TIME Spent:   Minutes non procedure based Comments >50% of visit spent in counseling and coordination of care    
________________________________________________________________________ Gunner Miranda MD  
 
Procedures: see electronic medical records for all procedures/Xrays and details which were not copied into this note but were reviewed prior to creation of Plan. LABS: 
I reviewed today's most current labs and imaging studies. Pertinent labs include: 
Recent Labs 05/08/21 
4287 05/06/21 
0421 WBC 5.0 5.3 HGB 11.0* 11.6* HCT 33.9* 35.5* * 144* Recent Labs 05/08/21 
9296 05/07/21 
0340 05/06/21 
0421  139 137  
K 4.0 4.0 3.8  109* 107 CO2 25 25 25 * 83 97 BUN 13 16 15 CREA 0.82 0.81 0.78  
CA 8.4* 8.8 8.7 Signed: Gunner Miranda MD

## 2021-05-08 NOTE — PROGRESS NOTES
Problem: Falls - Risk of 
Goal: *Absence of Falls Description: Document Ethel Gomez Fall Risk and appropriate interventions in the flowsheet. Outcome: Progressing Towards Goal 
Note: Fall Risk Interventions: 
Mobility Interventions: Bed/chair exit alarm, Patient to call before getting OOB Medication Interventions: Bed/chair exit alarm, Patient to call before getting OOB Elimination Interventions: Bed/chair exit alarm, Call light in reach Problem: Patient Education: Go to Patient Education Activity Goal: Patient/Family Education Outcome: Progressing Towards Goal 
  
Problem: Infection - Risk of, Surgical Site Infection Goal: *Absence of surgical site infection signs and symptoms Outcome: Progressing Towards Goal 
  
Problem: Patient Education: Go to Patient Education Activity Goal: Patient/Family Education Outcome: Progressing Towards Goal 
  
Problem: Diabetes Self-Management Goal: *Disease process and treatment process Description: Define diabetes and identify own type of diabetes; list 3 options for treating diabetes. Outcome: Progressing Towards Goal 
Goal: *Incorporating nutritional management into lifestyle Description: Describe effect of type, amount and timing of food on blood glucose; list 3 methods for planning meals. Outcome: Progressing Towards Goal 
Goal: *Incorporating physical activity into lifestyle Description: State effect of exercise on blood glucose levels. Outcome: Progressing Towards Goal 
Goal: *Developing strategies to promote health/change behavior Description: Define the ABC's of diabetes; identify appropriate screenings, schedule and personal plan for screenings. Outcome: Progressing Towards Goal 
Goal: *Using medications safely Description: State effect of diabetes medications on diabetes; name diabetes medication taking, action and side effects.  
Outcome: Progressing Towards Goal 
Goal: *Monitoring blood glucose, interpreting and using results Description: Identify recommended blood glucose targets  and personal targets. Outcome: Progressing Towards Goal 
Goal: *Prevention, detection, treatment of acute complications Description: List symptoms of hyper- and hypoglycemia; describe how to treat low blood sugar and actions for lowering  high blood glucose level. Outcome: Progressing Towards Goal 
Goal: *Prevention, detection and treatment of chronic complications Description: Define the natural course of diabetes and describe the relationship of blood glucose levels to long term complications of diabetes. Outcome: Progressing Towards Goal 
Goal: *Developing strategies to address psychosocial issues Description: Describe feelings about living with diabetes; identify support needed and support network Outcome: Progressing Towards Goal 
  
Problem: Patient Education: Go to Patient Education Activity Goal: Patient/Family Education Outcome: Progressing Towards Goal

## 2021-05-08 NOTE — PERIOP NOTES
TRANSFER - OUT REPORT: 
 
Verbal report given to Solitario RN (name) on Cheryl Cazares  being transferred to 2136(unit) for routine post - op Report consisted of patients Situation, Background, Assessment and  
Recommendations(SBAR). Information from the following report(s) SBAR, Kardex, OR Summary, Intake/Output, MAR and Recent Results was reviewed with the receiving nurse. Opportunity for questions and clarification was provided. Patient transported with: 
 Registered Nurse

## 2021-05-08 NOTE — PROGRESS NOTES
Podiatry Subjective: Patient is PO day #1 TMA right foot. He relates no pain. He did stand on the right foot and there was some blood spotting on the bandage. Patient Active Problem List  
 Diagnosis Date Noted  Diabetic foot infection ([facility] 2021  Ischemic cardiomyopathy 2019  Coronary artery disease involving native coronary artery of native heart without angina pectoris 2019  S/P PTCA (percutaneous transluminal coronary angioplasty) 2019  Hypertension 2019  Biventricular failure ([address] 2019  Non-healing ulcer of foot ([facility] [address] 10/20/2013 Past Medical History:  
Diagnosis Date  Diabetes ([facility] [address] Past Surgical History:  
Procedure Laterality Date  HX AMPUTATION  10/02/2012 Right 3rd toe Family History Problem Relation Age of Onset  Heart Disease Maternal Grandmother  Cancer Paternal Grandmother   
     Ryan Bernardo  Stroke Paternal Grandfather Social History Tobacco Use  Smoking status: Former Smoker Packs/day: 1.00 Years: 21.00 Pack years: 21.00 Types: Cigarettes Quit date: 2019 Years since quittin.4  Smokeless tobacco: Never Used Substance Use Topics  Alcohol use: No  
 
Allergies Allergen Reactions  Eliquis [Apixaban] Hives Prior to Admission medications Medication Sig Start Date End Date Taking?  Authorizing Provider  
atorvastatin (LIPITOR) 40 mg tablet Take 1 tablet by mouth nightly 21  Yes Taryn Platt MD  
carvediloL (COREG) 12.5 mg tablet Take 1 tablet by mouth twice daily 21  Yes Christiano Jenkins MD  
clopidogreL (PLAVIX) 75 mg tab Take 1 tablet by mouth once daily 21  Yes Christiano Jenkins MD  
furosemide (LASIX) 40 mg tablet Take 1 tablet by mouth once daily 21  Yes Christiano Jenkins MD  
losartan (COZAAR) 50 mg tablet Take 1 tablet by mouth once daily 21  Yes Alex Bautista NP aspirin delayed-release 81 mg tablet Take 1 Tab by mouth daily. 20  Yes Connie BUSTAMANTE NP  
methylPREDNISolone (MEDROL DOSEPACK) 4 mg tablet As directed. 19   Ken Beard NP Review of Systems AO x3. NAD. Objective:  
 
Patient Vitals for the past 8 hrs: 
 BP Temp Pulse Resp SpO2  
21 0852 (!) 160/84 98.5 °F (36.9 °C) 80 16 98 % 21 0410 (!) 145/88 98.3 °F (36.8 °C) 67 18 99 % Temp (24hrs), Av.1 °F (36.7 °C), Min:97.7 °F (36.5 °C), Max:98.5 °F (36.9 °C) Physical Exam Lower Extremity DP and PT palpable right foot; CFT less than 3 seconds Sutures and incision intact right foot. Moderate bleeding on bandage, but no active bleeding. Right forefoot absent from TMA Data Review:  
Recent Results (from the past 24 hour(s)) GLUCOSE, POC Collection Time: 21 11:29 AM  
Result Value Ref Range Glucose (POC) 116 (H) 65 - 100 mg/dL Performed by Fede Boone PCT ANKLE BRACHIAL INDEX Collection Time: 21  1:34 PM  
Result Value Ref Range Left arm  mmHg Left posterior tibial 176 mmHg Left DARIO 1.20 Left anterior tibial 176 mmHg Left toe pressure 123 mmHg Left TBI 0.84 Right arm  mmHg Right posterior tibial 155 mmHg Right anterior tibial 147 mmHg Right DARIO 1.05 Right toe pressure 145 mmHg Right TBI 0.99 Right REY level DPA Left REY level DPA GLUCOSE, POC Collection Time: 21  4:31 PM  
Result Value Ref Range Glucose (POC) 70 65 - 100 mg/dL Performed by Tamika Vigil GLUCOSE, POC Collection Time: 21  5:34 PM  
Result Value Ref Range Glucose (POC) 129 (H) 65 - 100 mg/dL Performed by Nayeli Ness* CULTURE, TISSUE W GRAM STAIN Collection Time: 21  6:30 PM  
 Specimen: Surgical Specimen Result Value Ref Range Special Requests: NO SPECIAL REQUESTS    
 GRAM STAIN OCCASIONAL WBCS SEEN    
 GRAM STAIN NO ORGANISMS SEEN  Culture result: PENDING GLUCOSE, POC Collection Time: 05/07/21  7:44 PM  
Result Value Ref Range Glucose (POC) 91 65 - 100 mg/dL Performed by Anisha Barnett GLUCOSE, POC Collection Time: 05/07/21 10:52 PM  
Result Value Ref Range Glucose (POC) 81 65 - 100 mg/dL Performed by Shaun Fierro (ZEUS RN) METABOLIC PANEL, BASIC Collection Time: 05/08/21  4:37 AM  
Result Value Ref Range Sodium 137 136 - 145 mmol/L Potassium 4.0 3.5 - 5.1 mmol/L Chloride 108 97 - 108 mmol/L  
 CO2 25 21 - 32 mmol/L Anion gap 4 (L) 5 - 15 mmol/L Glucose 103 (H) 65 - 100 mg/dL BUN 13 6 - 20 MG/DL Creatinine 0.82 0.70 - 1.30 MG/DL  
 BUN/Creatinine ratio 16 12 - 20 GFR est AA >60 >60 ml/min/1.73m2 GFR est non-AA >60 >60 ml/min/1.73m2 Calcium 8.4 (L) 8.5 - 10.1 MG/DL  
CBC W/O DIFF Collection Time: 05/08/21  4:37 AM  
Result Value Ref Range WBC 5.0 4.1 - 11.1 K/uL  
 RBC 3.99 (L) 4.10 - 5.70 M/uL  
 HGB 11.0 (L) 12.1 - 17.0 g/dL HCT 33.9 (L) 36.6 - 50.3 % MCV 85.0 80.0 - 99.0 FL  
 MCH 27.6 26.0 - 34.0 PG  
 MCHC 32.4 30.0 - 36.5 g/dL  
 RDW 13.1 11.5 - 14.5 % PLATELET 405 (L) 533 - 400 K/uL MPV 11.3 8.9 - 12.9 FL  
 NRBC 0.0 0  WBC ABSOLUTE NRBC 0.00 0.00 - 0.01 K/uL GLUCOSE, POC Collection Time: 05/08/21  6:43 AM  
Result Value Ref Range Glucose (POC) 88 65 - 100 mg/dL Performed by [facility] (PCT) GLUCOSE, POC Collection Time: 05/08/21  7:59 AM  
Result Value Ref Range Glucose (POC) 87 65 - 100 mg/dL Performed by Mamta Chang (ZEUS RN) Impression: POD #1 right TMA Recommendation:  
Bandage changed. Continue NWB right foot for now.

## 2021-05-09 NOTE — PROGRESS NOTES
Pharmacy Automatic Renal Dosing Protocol - Antimicrobials Indication for Antimicrobials:   Osteomyelitis- s/p right third toe amputation (in ) Current Regimen of Each Antimicrobial: 
Cefepime 1gm IV q8h         (start date ; Day #5) Metronidazole 500mg IV q12h (start Date ; Day #3) Vancomycin- pharmacy to Dose  1750mg IVx1 then 1250mg IV q8h (Start Date ; Day #3) Previous Antimicrobial Therapy: 
Zosyn 3.375gm IV x1 21 Vancomycin 1500mg x1 then 1000mg IV q12h - Pharmacy to dose (Start Date  ; Day # 2 of 7) Clindamycin 600mg IV q8h (start date ; Day #2) Goal Level: VANCOMYCIN TROUGH GOAL RANGE Vancomycin Trough: 15 - 20 mcg/mL  (AUC: 400 - 600 mg/hr/Liter/day) Date Dose & Interval Measured (mcg/mL) Extrapolated (mcg/mL) / AUC  
 1044 1250mg q 18h 18.1 21.22 Date & time of next level:  5/10 pm or  am 
 
Significant Cultures:  
 Blood-  NG- Prelim  Wound- moderate staph aurues- final (S to vancomycin, oxacillin) : tissue (surgical specimen)- light staph aureus- prelim : anaerobic: no growth - final 
: fungus: prelim Radiology / Imaging results: (X-ray, CT scan or MRI):  
  MRI of foot IMPRESSION 1. Findings concerning for osteomyelitis of the third and fourth metatarsals 
with associated ulcerations and sinus tracts extending to the plantar surface of 
the foot 2. At least reactive marrow changes of the first metatarsal head. Early 
osteomyelitis is difficult to exclude. There is a shallow plantar ulceration but 
no definite sinus tract or abscess Paralysis, amputations, malnutrition: Right 3rd toe amputation  (remote past) Labs: 
Recent Labs 21 
6849 21 
0340 21 
0421 CREA 0.82 0.81 0.78 BUN 13 16 15 WBC 5.0  --  5.3 Temp (24hrs), Av.2 °F (36.8 °C), Min:97.8 °F (36.6 °C), Max:98.7 °F (37.1 °C) Is the Patient on Dialysis?  No 
 
Creatinine Clearance (mL/min):  
CrCl (Actual Body Weight): 140.4 CrCl (Adjusted Body Weight): 137.4 CrCl (Ideal Body Weight): 135.3 Impression/Plan:  
Continue Cefepime;   Clindamycin  changed to Metronidazole Vancomycin trough resulted slightly elevated at ~21mcg/mL (auc 703), therefore will change dose to 1000mg q 8h to yield a trough of ~17mcg/mL and auc 563 Vancomycin trough 5/10pm or 5/11 am 
Added BMP daily x3, CBC QOD Antimicrobial stop date -tbd Pharmacy will follow daily and adjust medications as appropriate for renal function and/or serum levels. Thank you, Jim Nur [place]

## 2021-05-09 NOTE — PROGRESS NOTES
End of Shift Note Bedside shift change report given to [address] [place] [address] (oncoming nurse) by Hugh Birmingham (offgoing nurse). Report included the following information SBAR Shift worked:  night Shift summary and any significant changes:  
  none Concerns for physician to address:    
  
Zone phone for oncoming shift:     
  
 
Activity: 
Activity Level: Up with Assistance Number times ambulated in hallways past shift: 0 Number of times OOB to chair past shift: 0 Cardiac:  
Cardiac Monitoring: Yes     
Cardiac Rhythm: Sinus Rhythm Access:  
Current line(s): PIV Genitourinary:  
Urinary status: voiding Respiratory:  
O2 Device: None (Room air) Chronic home O2 use?: NO Incentive spirometer at bedside: NO 
  
GI: 
Last Bowel Movement Date: 05/06/21 Current diet:  DIET DIABETIC CONSISTENT CARB Regular Passing flatus: YES Tolerating current diet: YES Pain Management:  
Patient states pain is manageable on current regimen: YES Skin: 
Shukri Score: 20 Interventions: increase time out of bed Patient Safety: 
Fall Score: Total Score: 2 Interventions: bed/chair alarm High Fall Risk: Yes Length of Stay: 
Expected LOS: 2d 21h Actual LOS: 5 Hugh Birmingham

## 2021-05-09 NOTE — PROGRESS NOTES
Hospitalist Progress Note NAME: Lise Harper :  1980 MRN:  [id number] Don Brown is a 36 y.o.  male past history significant for ischemic cardiomyopathy with ejection fraction of 20%, type 2 diabetes, hypertension, presenting with complaints of worsening foot ulcer that he had discovered about a week ago and that it has started to become purulent with pus in the last day. Assessment / Plan: 
Right foot osteomyelitis POA 
-X-ray of right foot shows evidence of Charcot joint versus septic arthritis/osteomyelitis 
-MRI of the right foot reviewed, shows osteomyelitis of third and fourth metatarsal 
-Follow blood cultures that were sent in the ED along with wound cultures 
-Continue clindamycin and cefepime that was started by admitting MD 
 
-Plan for right foot TMA today 
-Consult ID 
 
: 
Pt s/p TMA R foot yesterday Podiatry evals on going ID evals on going Continue with IV abx Await intra-op cx results : 
Continue to follow cx results Hopeful to discharge tomorrow with ID and Podiatry clearancee Type 2 diabetes mellitus with hyperglycemia Last A1c is 6.1% SSI 
  
Systolic heart failure ejection fraction of 20% History of coronary artery disease cardiac MRI showing only viable RCA territory underwent PTCA stenting in  Hypertension 
-Continue aspirin, statin and Coreg, lasix 
-Holding home Plavix in anticipation for surgery and restart when cleared by Podiatry History of left ventricular thrombus was on Eliquis for 6 months 
  
Code Status: Full code Surrogate Decision Maker: Wife information on record 
  
DVT Prophylaxis: SCDs - continue for now GI Prophylaxis: not indicated 
  
Baseline: Independent Body mass index is 23.7 kg/m². Subjective: Chief Complaint / Reason for Physician Visit No complaints, no nausea, no vomiting. Review of Systems: 
Symptom Y/N Comments  Symptom Y/N Comments Fever/Chills n   Chest Pain n   
Poor Appetite    Edema n   
Cough n   Abdominal Pain n   
Sputum    Joint Pain SOB/DANIELSON    Pruritis/Rash Nausea/vomit    Tolerating PT/OT Diarrhea    Tolerating Diet y Constipation    Other Could NOT obtain due to:   
 
Objective: VITALS:  
Last 24hrs VS reviewed since prior progress note. Most recent are: 
Patient Vitals for the past 24 hrs: 
 Temp Pulse Resp BP SpO2  
05/09/21 0810 98.3 °F (36.8 °C) 77 16 139/89 99 % 05/09/21 0333 98 °F (36.7 °C) 73 18 138/78 100 % 05/08/21 2336 97.8 °F (36.6 °C) 60 19 (!) 145/82 100 % 05/08/21 2019 99 °F (37.2 °C) 63 18 (!) 156/74 99 % 05/08/21 1848 98.8 °F (37.1 °C) 66 17 129/77 100 % 05/08/21 1308 98.7 °F (37.1 °C) 65 18 (!) 143/86 98 % Intake/Output Summary (Last 24 hours) at 5/9/2021 1005 Last data filed at 5/9/2021 4971 Gross per 24 hour Intake 1165 ml Output 2075 ml Net -910 ml PHYSICAL EXAM: 
General: WD, WN. Alert, cooperative, no acute distress   
EENT:  EOMI. Anicteric sclerae. MMM Resp:  CTA bilaterally, no wheezing or rales. No accessory muscle use CV:  Regular  rhythm,  No edema GI:  Soft, Non distended, Non tender.  +Bowel sounds Neurologic:  Alert and oriented X 3, normal speech, Psych:   Not anxious nor agitated Skin:  Right foot wrapped Reviewed most current lab test results and cultures  YES Reviewed most current radiology test results   YES Review and summation of old records today    NO Reviewed patient's current orders and MAR    YES 
PMH/SH reviewed - no change compared to H&P Current Facility-Administered Medications:   Vancomycin Trough: Sunday, 5/9 prior to 0800 dose, RN please collect, thank you, , Other, ONCE, Stan Lagunas, Zackary Hammer MD 
  metroNIDAZOLE (FLAGYL) IVPB premix 500 mg, 500 mg, IntraVENous, Q12H, Narinder Land DPM, Last Rate: 100 mL/hr at 05/09/21 0327, 500 mg at 05/09/21 0327   furosemide (LASIX) tablet 20 mg, 20 mg, Oral, DAILY, Narinder Land, MICHOACANO, 20 mg at 05/09/21 4134 
  vancomycin (VANCOCIN) 1250 mg in  ml infusion, 1,250 mg, IntraVENous, Q8H, Belock, Beltran Rolling, DPM, Last Rate: 125 mL/hr at 05/09/21 0906, 1,250 mg at 05/09/21 1603   oxyCODONE IR (ROXICODONE) tablet 5 mg, 5 mg, Oral, Q4H PRN, Belock, Beltran Rolling, DPM 
  aspirin delayed-release tablet 81 mg, 81 mg, Oral, DAILY, Belock, Beltran Rolling, DPM, 81 mg at 05/09/21 3416   atorvastatin (LIPITOR) tablet 40 mg, 40 mg, Oral, QHS, Belock, Beltran Rolling, DPM, 40 mg at 05/08/21 2142   carvediloL (COREG) tablet 12.5 mg, 12.5 mg, Oral, BID WITH MEALS, Belock, Beltran Rolling, DPM, 12.5 mg at 05/09/21 4992   sodium chloride (NS) flush 5-40 mL, 5-40 mL, IntraVENous, Q8H, Belock, Beltran Rolling, DPM, 10 mL at 05/08/21 2142   sodium chloride (NS) flush 5-40 mL, 5-40 mL, IntraVENous, PRN, Belock, Beltran Rolling, DPM 
  acetaminophen (TYLENOL) tablet 650 mg, 650 mg, Oral, Q6H PRN **OR** acetaminophen (TYLENOL) suppository 650 mg, 650 mg, Rectal, Q6H PRN, Belock, Beltran Rolling, DPM 
  polyethylene glycol (MIRALAX) packet 17 g, 17 g, Oral, DAILY PRN, Belock, Beltran Rolling, DPM 
  promethazine (PHENERGAN) tablet 12.5 mg, 12.5 mg, Oral, Q6H PRN **OR** ondansetron (ZOFRAN) injection 4 mg, 4 mg, IntraVENous, Q6H PRN, Belock, Beltran Rolling, DPM 
  cefepime (MAXIPIME) 1 g in 0.9% sodium chloride (MBP/ADV) 50 mL MBP, 1 g, IntraVENous, Q8H, Belock, Beltran Rolling, DPM, Last Rate: 100 mL/hr at 05/09/21 0553, 1 g at 05/09/21 1417   glucose chewable tablet 16 g, 4 Tab, Oral, PRN, Belock, Beltran Rolling, DPM 
  dextrose (D50W) injection syrg 12.5-25 g, 25-50 mL, IntraVENous, PRN, Belock, Beltran Rolling, DPM, 25 g at 05/05/21 1258   glucagon (GLUCAGEN) injection 1 mg, 1 mg, IntraMUSCular, PRN, Belock, Beltran Rolling, DPM 
  insulin lispro (HUMALOG) injection, , SubCUTAneous, AC&HS, Belock, Beltran Rolling, DPM, Stopped at 05/06/21 1630 
________________________________________________________________________ Care Plan discussed with: 
  Comments Patient y Family RN y   
Care Manager Consultant Multidiciplinary team rounds were held today with , nursing, pharmacist and clinical coordinator. Patient's plan of care was discussed; medications were reviewed and discharge planning was addressed. ________________________________________________________________________ Total NON critical care TIME: 35   Minutes Total CRITICAL CARE TIME Spent:   Minutes non procedure based Comments >50% of visit spent in counseling and coordination of care    
________________________________________________________________________ Trinity Walsh MD  
 
Procedures: see electronic medical records for all procedures/Xrays and details which were not copied into this note but were reviewed prior to creation of Plan. LABS: 
I reviewed today's most current labs and imaging studies. Pertinent labs include: 
Recent Labs 05/08/21 
8115 WBC 5.0 HGB 11.0*  
HCT 33.9*  
* Recent Labs 05/08/21 
3568 05/07/21 
0340  139  
K 4.0 4.0  
 109* CO2 25 25 * 83 BUN 13 16 CREA 0.82 0.81 CA 8.4* 8.8 Signed: Trinity Walsh MD

## 2021-05-09 NOTE — PROGRESS NOTES
Problem: Falls - Risk of 
Goal: *Absence of Falls Description: Document Iva Watts Fall Risk and appropriate interventions in the flowsheet. Outcome: Progressing Towards Goal 
Note: Fall Risk Interventions: 
Mobility Interventions: Bed/chair exit alarm, Patient to call before getting OOB Medication Interventions: Bed/chair exit alarm, Patient to call before getting OOB Elimination Interventions: Bed/chair exit alarm, Call light in reach Problem: Patient Education: Go to Patient Education Activity Goal: Patient/Family Education Outcome: Progressing Towards Goal 
  
Problem: Infection - Risk of, Surgical Site Infection Goal: *Absence of surgical site infection signs and symptoms Outcome: Progressing Towards Goal 
  
Problem: Patient Education: Go to Patient Education Activity Goal: Patient/Family Education Outcome: Progressing Towards Goal 
  
Problem: Diabetes Self-Management Goal: *Disease process and treatment process Description: Define diabetes and identify own type of diabetes; list 3 options for treating diabetes. Outcome: Progressing Towards Goal 
Goal: *Incorporating nutritional management into lifestyle Description: Describe effect of type, amount and timing of food on blood glucose; list 3 methods for planning meals. Outcome: Progressing Towards Goal 
Goal: *Incorporating physical activity into lifestyle Description: State effect of exercise on blood glucose levels. Outcome: Progressing Towards Goal 
Goal: *Developing strategies to promote health/change behavior Description: Define the ABC's of diabetes; identify appropriate screenings, schedule and personal plan for screenings. Outcome: Progressing Towards Goal 
Goal: *Using medications safely Description: State effect of diabetes medications on diabetes; name diabetes medication taking, action and side effects.  
Outcome: Progressing Towards Goal 
Goal: *Monitoring blood glucose, interpreting and using results Description: Identify recommended blood glucose targets  and personal targets. Outcome: Progressing Towards Goal 
Goal: *Prevention, detection, treatment of acute complications Description: List symptoms of hyper- and hypoglycemia; describe how to treat low blood sugar and actions for lowering  high blood glucose level. Outcome: Progressing Towards Goal 
Goal: *Prevention, detection and treatment of chronic complications Description: Define the natural course of diabetes and describe the relationship of blood glucose levels to long term complications of diabetes. Outcome: Progressing Towards Goal 
Goal: *Developing strategies to address psychosocial issues Description: Describe feelings about living with diabetes; identify support needed and support network Outcome: Progressing Towards Goal 
  
Problem: Patient Education: Go to Patient Education Activity Goal: Patient/Family Education Outcome: Progressing Towards Goal

## 2021-05-09 NOTE — PROGRESS NOTES
Problem: Falls - Risk of 
Goal: *Absence of Falls Description: Document Gaviota Cornejo Fall Risk and appropriate interventions in the flowsheet. Outcome: Progressing Towards Goal 
Note: Fall Risk Interventions: 
Mobility Interventions: Bed/chair exit alarm Medication Interventions: Assess postural VS orthostatic hypotension, Bed/chair exit alarm Elimination Interventions: Bed/chair exit alarm, Call light in reach Problem: Patient Education: Go to Patient Education Activity Goal: Patient/Family Education Outcome: Progressing Towards Goal 
  
Problem: Infection - Risk of, Surgical Site Infection Goal: *Absence of surgical site infection signs and symptoms Outcome: Progressing Towards Goal 
  
Problem: Patient Education: Go to Patient Education Activity Goal: Patient/Family Education Outcome: Progressing Towards Goal 
  
Problem: Diabetes Self-Management Goal: *Disease process and treatment process Description: Define diabetes and identify own type of diabetes; list 3 options for treating diabetes. Outcome: Progressing Towards Goal 
Goal: *Incorporating nutritional management into lifestyle Description: Describe effect of type, amount and timing of food on blood glucose; list 3 methods for planning meals. Outcome: Progressing Towards Goal 
Goal: *Incorporating physical activity into lifestyle Description: State effect of exercise on blood glucose levels. Outcome: Progressing Towards Goal 
Goal: *Developing strategies to promote health/change behavior Description: Define the ABC's of diabetes; identify appropriate screenings, schedule and personal plan for screenings. Outcome: Progressing Towards Goal 
Goal: *Using medications safely Description: State effect of diabetes medications on diabetes; name diabetes medication taking, action and side effects. Outcome: Progressing Towards Goal 
Goal: *Monitoring blood glucose, interpreting and using results Description: Identify recommended blood glucose targets  and personal targets. Outcome: Progressing Towards Goal 
Goal: *Prevention, detection, treatment of acute complications Description: List symptoms of hyper- and hypoglycemia; describe how to treat low blood sugar and actions for lowering  high blood glucose level. Outcome: Progressing Towards Goal 
Goal: *Prevention, detection and treatment of chronic complications Description: Define the natural course of diabetes and describe the relationship of blood glucose levels to long term complications of diabetes. Outcome: Progressing Towards Goal 
Goal: *Developing strategies to address psychosocial issues Description: Describe feelings about living with diabetes; identify support needed and support network Outcome: Progressing Towards Goal 
  
Problem: Patient Education: Go to Patient Education Activity Goal: Patient/Family Education Outcome: Progressing Towards Goal

## 2021-05-10 NOTE — PROGRESS NOTES
Podiatry Subjective: Patient is S/P TMA right foot. He relates no pain. Patient Active Problem List  
 Diagnosis Date Noted  Diabetic foot infection ([facility] 2021  Ischemic cardiomyopathy 2019  Coronary artery disease involving native coronary artery of native heart without angina pectoris 2019  S/P PTCA (percutaneous transluminal coronary angioplasty) 2019  Hypertension 2019  Biventricular failure ([facility] 2019  Non-healing ulcer of foot ([facility] 10/20/2013 Past Medical History:  
Diagnosis Date  Diabetes ([facility] Past Surgical History:  
Procedure Laterality Date  HX AMPUTATION  10/02/2012 Right 3rd toe Family History Problem Relation Age of Onset  Heart Disease Maternal Grandmother  Cancer Paternal Grandmother   
     Servando Pond  Stroke Paternal Grandfather Social History Tobacco Use  Smoking status: Former Smoker Packs/day: 1.00 Years: 21.00 Pack years: 21.00 Types: Cigarettes Quit date: 2019 Years since quittin.4  Smokeless tobacco: Never Used Substance Use Topics  Alcohol use: No  
 
Allergies Allergen Reactions  Eliquis [Apixaban] Hives  Vancomycin Rash Itchy rash, swollen eyes, red man syndrome Prior to Admission medications Medication Sig Start Date End Date Taking?  Authorizing Provider  
atorvastatin (LIPITOR) 40 mg tablet Take 1 tablet by mouth nightly 21  Yes Jose G Platt MD  
carvediloL (COREG) 12.5 mg tablet Take 1 tablet by mouth twice daily 21  Yes Yamile Mcallister MD  
clopidogreL (PLAVIX) 75 mg tab Take 1 tablet by mouth once daily 21  Yes Yamile Mcallister MD  
furosemide (LASIX) 40 mg tablet Take 1 tablet by mouth once daily 21  Yes Yamile Mcallister MD  
losartan (COZAAR) 50 mg tablet Take 1 tablet by mouth once daily 21  Yes Yaa Cast NP  
aspirin delayed-release 81 mg tablet Take 1 Tab by mouth daily. 20  Yes Sang BUSTAMANTE NP  
methylPREDNISolone (MEDROL DOSEPACK) 4 mg tablet As directed. 19   Demar Mascorro NP Review of Systems AO x3. NAD. Objective:  
 
Patient Vitals for the past 8 hrs: 
 BP Temp Pulse Resp SpO2  
05/10/21 1509 130/82 98.3 °F (36.8 °C) 61 16 99 % 05/10/21 1202 130/78 98 °F (36.7 °C) 89 16 100 % Temp (24hrs), Av.4 °F (36.9 °C), Min:98 °F (36.7 °C), Max:98.8 °F (37.1 °C) Physical Exam Lower Extremity DP and PT palpable right foot; CFT less than 3 seconds Sutures and incision intact right foot. Moderate bleeding on bandage, but no active bleeding. Right forefoot absent from TMA 
 
C&S: MSSA Pathology: Pending Data Review:  
Recent Results (from the past 24 hour(s)) GLUCOSE, POC Collection Time: 21  9:10 PM  
Result Value Ref Range Glucose (POC) 155 (H) 65 - 100 mg/dL Performed by Ekaterina Mckeon CBC W/O DIFF Collection Time: 05/10/21  6:00 AM  
Result Value Ref Range WBC 6.0 4.1 - 11.1 K/uL  
 RBC 4.01 (L) 4.10 - 5.70 M/uL  
 HGB 11.1 (L) 12.1 - 17.0 g/dL HCT 33.5 (L) 36.6 - 50.3 % MCV 83.5 80.0 - 99.0 FL  
 MCH 27.7 26.0 - 34.0 PG  
 MCHC 33.1 30.0 - 36.5 g/dL  
 RDW 13.1 11.5 - 14.5 % PLATELET 974 (L) 848 - 400 K/uL MPV 12.0 8.9 - 12.9 FL  
 NRBC 0.0 0  WBC ABSOLUTE NRBC 0.00 0.00 - 0.01 K/uL GLUCOSE, POC Collection Time: 05/10/21  6:47 AM  
Result Value Ref Range Glucose (POC) 108 (H) 65 - 100 mg/dL Performed by Ekaterina Mckeon GLUCOSE, POC Collection Time: 05/10/21 11:34 AM  
Result Value Ref Range Glucose (POC) 137 (H) 65 - 100 mg/dL Performed by Agustin Conception PCT GLUCOSE, POC Collection Time: 05/10/21  4:37 PM  
Result Value Ref Range Glucose (POC) 232 (H) 65 - 100 mg/dL Performed by Agustin Conception PCT Impression: S/P right TMA Recommendation:  
Bandage changed.  Patient may now WB on right foot in PO shoe. Still waiting on pathology results.

## 2021-05-10 NOTE — PROGRESS NOTES
End of Shift Note Bedside shift change report given to Jadiel Luna (oncoming nurse) by Pam Romero (offgoing nurse). Report included the following information SBAR, Kardex, Procedure Summary, Intake/Output, MAR and Recent Results Shift worked:  7p-7a Shift summary and any significant changes:  
 Pt slept most of the night. Pt rash spreading to more areas than left arm and flank. Edema noticed around bilateral eyes. Apprised oncoming staff. Concerns for physician to address:   
  
Zone phone for oncoming shift:   [phone] Activity: 
Activity Level: Up with Assistance Number times ambulated in hallways past shift: 0 Number of times OOB to chair past shift: 0 Cardiac:  
Cardiac Monitoring: No     
Cardiac Rhythm: Sinus Rhythm Access:  
Current line(s): PIV Genitourinary:  
Urinary status: voiding Respiratory:  
O2 Device: None (Room air) Chronic home O2 use?: NO Incentive spirometer at bedside: YES 
  
GI: 
Last Bowel Movement Date: 05/06/21 Current diet:  DIET DIABETIC CONSISTENT CARB Regular Passing flatus: YES Tolerating current diet: YES Pain Management:  
Patient states pain is manageable on current regimen: YES Skin: 
Shukri Score: 20 Interventions: increase time out of bed Patient Safety: 
Fall Score: Total Score: 2 Interventions: pt to call before getting OOB High Fall Risk: Yes Length of Stay: 
Expected LOS: 2d 21h Actual LOS: 6 Pam Romero

## 2021-05-10 NOTE — PROGRESS NOTES
Infectious Disease Progress Note IMPRESSION:  
· Diabetic foot with chronic draining plantar ulcers ,osteomyelitis of right foot · X-ray-evidence of Charcot joint vs septic arthritis/osteomyelitis · MRI-OM of third and fourth metatarsals, associated ulceration and sinus tracts extending to plantar surface of foot. Reactive changes of first MT head, early OM difficult to exclude · S/p transmetatarsal amputation of right foot on  · Wound culture-MSSA( patient has been on antibiotics prior to surgery) · H/o MRSA infection · Diabetes mellitus type 2 with hyperglycemia, A1c six-point · CHF/CMP EF 20 · H/o left ventricular thrombus on Eliquis · Maculopapular rash? Vancomycin allergy · Normal resting DARIO PLAN:  
  
· Change to Daptomycin, Flagyl IV, DC vancomycin, cefepime · Await cultures-aerobic, anaerobic, fungal, biopsy results-wound margins · D/w microbiology-evaluate for actinomyces, nocardia as well(in view of chronicity of draining wounds) · Blood sugar control · Plan of care discussed with patient. · Please contact ID on call with questions, I will be back on Wednesday. Subjective:  
 
Patient seen today. Redness of face noted. Patient states he had \"reaction\" to an antibiotic. D/w RN events of the day. I am told it was an reaction to possibly vancomycin IV Patient states he Review of Systems:  A comprehensive review of systems was negative except for that written in the History of Present Illness. 14 point ROS obtained . All other systems negative . Objective:  
 
Blood pressure 130/82, pulse 61, temperature 98.3 °F (36.8 °C), resp. rate 16, height 6' 1\" (1.854 m), weight 179 lb 9.6 oz (81.5 kg), SpO2 99 %. Temp (24hrs), Av.4 °F (36.9 °C), Min:98 °F (36.7 °C), Max:98.8 °F (37.1 °C) Patient Vitals for the past 24 hrs: 
 Temp Pulse Resp BP SpO2  
05/10/21 1509 98.3 °F (36.8 °C) 61 16 130/82 99 % 05/10/21 1202 98 °F (36.7 °C) 89 16 130/78 100 % 05/10/21 0828 98.8 °F (37.1 °C) 74 16 (!) 146/82 98 % 05/09/21 2325 98.6 °F (37 °C) 67 16 (!) 142/83 96 % 05/09/21 2000 98.2 °F (36.8 °C) 62 16 (!) 142/80 99 % Lines:  Peripheral IV:    
 
Physical Exam:  
General:  Awake cooperative, Eyes:  Sclera anicteric. Pupils equally round and reactive to light. Erythema of cheeks noted Mouth/Throat: Mucous membranes , oral pharynx clear Neck: Supple Lungs:   reduced auscultation bilaterally, good effort CV:  Regular rate and rhythm,no murmur, click, rub or gallop Abdomen:   Soft, non-tender. bowel sounds normal. non-distended Extremities: No cyanosis or edema Skin:  Macular rash on upper extremities back turgor normal. no acute rash or lesions Lymph nodes: Cervical and supraclavicular normal  
Musculoskeletal: No swelling or deformity Lines/Devices:  Intact, no erythema, drainage or tenderness Psych: Alert and oriented, normal mood affect Data Reviewed: CBC:  
Recent Labs 05/10/21 
0600 05/08/21 
0437 WBC 6.0 5.0  
RBC 4.01* 3.99* HGB 11.1* 11.0*  
HCT 33.5* 33.9*  
* 134* CMP:  
Recent Labs 05/08/21 
9252 *   
K 4.0  
 CO2 25 BUN 13  
CREA 0.82 CA 8.4* AGAP 4*  
BUCR 16  
 
 
Studies:     
Lab Results Component Value Date/Time Culture result: NO GROWTH THUS FAR, HOLDING FOR 7 DAYS 05/07/2021 06:30 PM  
 Culture result: LIGHT STAPHYLOCOCCUS AUREUS (A) 05/07/2021 06:30 PM  
 Culture result: NO FUNGUS ISOLATED 3 DAYS 05/07/2021 06:30 PM  
 Culture result: MODERATE STAPHYLOCOCCUS AUREUS (A) 05/04/2021 05:58 PM  
 Culture result: NO GROWTH 6 DAYS 05/04/2021 04:47 PM  
  
 
XR Results (most recent): 
Results from Hospital Encounter encounter on 05/04/21 XR FOOT RT MIN 3 V Narrative EXAM: XR FOOT RT MIN 3 V 
 
INDICATION: Right foot ulcer. COMPARISON: 10/24/2020 FINDINGS: Three views of the right foot. The location of the ulcer is not 
provided.  There is significant interval erosion of the head of the first 
metatarsal, base of the first proximal phalanx, and first sesamoid bone. Previously, there was only subtle periarticular lucency. Charcot joint is 
slightly favored over septic arthritis and osteomyelitis. An ulcer over the 
first MTP joint would make septic arthritis more likely. Post transmetatarsal 
amputation second-fourth digits, without amputation of the third-fifth 
phalanges. Impression Significant progression of erosion of the first MTP joint. Charcot 
joint is slightly favored over septic arthritis and osteomyelitis. An ulcer over 
the first MTP joint would make septic arthritis more likely. Patient Active Problem List  
Diagnosis Code  Non-healing ulcer of foot ([facility] L97.509  Biventricular failure ([facility]) I50.82  Hypertension I10  
 Ischemic cardiomyopathy I25.5  Coronary artery disease involving native coronary artery of native heart without angina pectoris I25.10  
 S/P PTCA (percutaneous transluminal coronary angioplasty) Z98.61  
 Diabetic foot infection ([facility]) E11.628, L08.9 ICD-10-CM ICD-9-CM 1. Diabetic foot ulcer with osteomyelitis ([facility]  E11.621 250.80   
 E11.69 707.15   
 L97.509 730.27 M86.9 731.8 2. Cellulitis of foot  L03.119 682.7 I have discussed the diagnosis with the patient and the intended plan as seen in the above orders. I have discussed medication side effects and warnings with the patient as well. Reviewed test results at length with patient Anti-infectives: S/p Vancomycin, Cefepime, Flagyl IV  Marcos Kirk MD FACP

## 2021-05-10 NOTE — PROGRESS NOTES
0750 pt bilat eyes swollen +2, rash is now on back, chest, RUE. Dr. Johny Harrisonuty notified. 1800 bilat eyes less red and trace swelling. End of Shift Note Bedside shift change report given to Marcus Morse (oncoming nurse) by Maday Lew (offgoing nurse). Report included the following information SBAR, Kardex and [facility] Shift worked:  7a-7p Shift summary and any significant changes:  
  see above Concerns for physician to address:  none Zone phone for oncoming shift:   [phone] Activity: 
Activity Level: Bed Rest 
Number times ambulated in hallways past shift: 0 Number of times OOB to chair past shift: 0 Cardiac:  
Cardiac Monitoring: No     
Cardiac Rhythm: Sinus Rhythm Access:  
Current line(s): PIV Genitourinary:  
Urinary status: voiding Respiratory:  
O2 Device: None (Room air) Chronic home O2 use?: NO Incentive spirometer at bedside: YES 
  
GI: 
Last Bowel Movement Date: 05/06/21 Current diet:  DIET DIABETIC CONSISTENT CARB Regular Passing flatus: YES Tolerating current diet: YES Pain Management:  
Patient states pain is manageable on current regimen: YES Skin: 
Shukri Score: 20 Interventions: float heels and increase time out of bed Patient Safety: 
Fall Score: Total Score: 2 Interventions: assistive device (walker, cane, etc), gripper socks and pt to call before getting OOB High Fall Risk: Yes Length of Stay: 
Expected LOS: 2d 21h Actual LOS: 6 Maday Lew

## 2021-05-10 NOTE — PROGRESS NOTES
Hospitalist Progress Note NAME: Cheryl Cazares :  1980 MRN:  [id number] Sampson Rangel is a 36 y.o.  male past history significant for ischemic cardiomyopathy with ejection fraction of 20%, type 2 diabetes, hypertension, presenting with complaints of worsening foot ulcer that he had discovered about a week ago and that it has started to become purulent with pus in the last day. Assessment / Plan: 
Right foot osteomyelitis POA 
-X-ray of right foot shows evidence of Charcot joint versus septic arthritis/osteomyelitis 
-MRI of the right foot reviewed, shows osteomyelitis of third and fourth metatarsal 
-Follow blood cultures that were sent in the ED along with wound cultures 
-Continue clindamycin and cefepime that was started by admitting MD 
 
-Plan for right foot TMA today 
-Consult ID 
 
: 
Pt s/p TMA R foot yesterday Podiatry evals on going ID evals on going Continue with IV abx Await intra-op cx results : 
Continue to follow cx results Hopeful to discharge tomorrow with ID and Podiatry clearancee 5/10: 
Pending Podiatry and ID eval for today Acute allergic angioedema, etiology red man syndrome Pt denies any lip, tongue or throat swelling. He denies any difficulty breathing. Saturating well on RA. Likely secondary to Vancomycin - therefore dc Give IV steroids and Loratadine Wean steroids as rash improves Type 2 diabetes mellitus with hyperglycemia Last A1c is 6.1% SSI 
  
Systolic heart failure ejection fraction of 20% History of coronary artery disease cardiac MRI showing only viable RCA territory underwent PTCA stenting in  Hypertension 
-Continue aspirin, statin and Coreg, lasix 
-Holding home Plavix in anticipation for surgery and restart when cleared by Podiatry History of left ventricular thrombus was on Eliquis for 6 months 
  
Code Status: Full code Surrogate Decision Maker: Wife information on record 
  
DVT Prophylaxis: SCDs - continue for now GI Prophylaxis: not indicated 
  
Baseline: Independent Body mass index is 23.7 kg/m². Subjective: Chief Complaint / Reason for Physician Visit Complaining of rash on torso, left arm and face. Itching. Review of Systems: 
Symptom Y/N Comments  Symptom Y/N Comments Fever/Chills n   Chest Pain n   
Poor Appetite    Edema n   
Cough n   Abdominal Pain n   
Sputum    Joint Pain SOB/DANIELSON    Pruritis/Rash y   
Nausea/vomit    Tolerating PT/OT Diarrhea    Tolerating Diet y Constipation    Other Could NOT obtain due to:   
 
Objective: VITALS:  
Last 24hrs VS reviewed since prior progress note. Most recent are: 
Patient Vitals for the past 24 hrs: 
 Temp Pulse Resp BP SpO2  
05/10/21 0828 98.8 °F (37.1 °C) 74 16 (!) 146/82 98 % 05/09/21 2325 98.6 °F (37 °C) 67 16 (!) 142/83 96 % 05/09/21 2000 98.2 °F (36.8 °C) 62 16 (!) 142/80 99 % 05/09/21 1510 98.3 °F (36.8 °C) 75 16 (!) 156/83 99 % 05/09/21 1152 97.6 °F (36.4 °C) 62 16 (!) 149/87 100 % Intake/Output Summary (Last 24 hours) at 5/10/2021 0901 Last data filed at 5/10/2021 3321 Gross per 24 hour Intake 120 ml Output 1350 ml Net -1230 ml PHYSICAL EXAM: 
General: Alert, cooperative, no acute distress   
EENT:  EOMI. Anicteric sclerae. MMM. No tongue or lip swelling noted. Resp:  CTA bilaterally, no wheezing or rales. No accessory muscle use CV:  Regular  rhythm,  No edema GI:  Soft, Non distended, Non tender.  +Bowel sounds Neurologic:  Alert and oriented X 3, normal speech, Psych:   Not anxious nor agitated Skin:  Right foot wrapped. Erythematous, pruritic papules on LUE, far more apparent on L torso, erythema and edema of bilateral orbitals. Reviewed most current lab test results and cultures  YES Reviewed most current radiology test results   YES Review and summation of old records today    NO Reviewed patient's current orders and MAR    YES 
PMH/SH reviewed - no change compared to H&P Current Facility-Administered Medications:  
  loratadine (CLARITIN) tablet 10 mg, 10 mg, Oral, DAILY, Abi Tilley MD 
  methylPREDNISolone (PF) (SOLU-MEDROL) injection 125 mg, 125 mg, IntraVENous, ONCE, Abi Tilley MD 
  methylPREDNISolone (PF) (SOLU-MEDROL) injection 40 mg, 40 mg, IntraVENous, Q12H, Abi Tilley MD 
  metroNIDAZOLE (FLAGYL) IVPB premix 500 mg, 500 mg, IntraVENous, Q12H, [place] Land DPZARI, Last Rate: 100 mL/hr at 05/10/21 0147, 500 mg at 05/10/21 0147   furosemide (LASIX) tablet 20 mg, 20 mg, Oral, DAILY, [place] Land DPM, 20 mg at 05/09/21 9005   oxyCODONE IR (ROXICODONE) tablet 5 mg, 5 mg, Oral, Q4H PRN, [place] Land, MICHOACANO 
  aspirin delayed-release tablet 81 mg, 81 mg, Oral, DAILY, [place], DPM, 81 mg at 05/09/21 7974   atorvastatin (LIPITOR) tablet 40 mg, 40 mg, Oral, QHS, Brooklyn Land DPM, 40 mg at 05/09/21 2136   carvediloL (COREG) tablet 12.5 mg, 12.5 mg, Oral, BID WITH MEALS, [place], DPM, 12.5 mg at 05/09/21 1725   sodium chloride (NS) flush 5-40 mL, 5-40 mL, IntraVENous, Q8H, Ernesto Land DPM, 10 mL at 05/10/21 0541 
  sodium chloride (NS) flush 5-40 mL, 5-40 mL, IntraVENous, PRN, [place], DPZARI 
  acetaminophen (TYLENOL) tablet 650 mg, 650 mg, Oral, Q6H PRN **OR** acetaminophen (TYLENOL) suppository 650 mg, 650 mg, Rectal, Q6H PRN, [place], DPZARI 
  polyethylene glycol (MIRALAX) packet 17 g, 17 g, Oral, DAILY PRN, [place], DPM 
  promethazine (PHENERGAN) tablet 12.5 mg, 12.5 mg, Oral, Q6H PRN **OR** ondansetron (ZOFRAN) injection 4 mg, 4 mg, IntraVENous, Q6H PRN, [place], DPM 
  cefepime (MAXIPIME) 1 g in 0.9% sodium chloride (MBP/ADV) 50 mL MBP, 1 g, IntraVENous, Q8H, [place] Land DPZARI, Last Rate: 100 mL/hr at 05/10/21 0541, 1 g at 05/10/21 0541 
  glucose chewable tablet 16 g, 4 Tab, Oral, PRN, [place] Land, DPM 
  dextrose (D50W) injection syrg 12.5-25 g, 25-50 mL, IntraVENous, PRN, Mercedes Mayfieldl, DPM, 25 g at 05/05/21 1258   glucagon (GLUCAGEN) injection 1 mg, 1 mg, IntraMUSCular, PRN, Miguel Land, DPZARI 
  insulin lispro (HUMALOG) injection, , SubCUTAneous, AC&HS, Miguel Land, DPM, Stopped at 05/06/21 1630 
________________________________________________________________________ Care Plan discussed with: 
  Comments Patient y Family RN y   
Care Manager Consultant Multidiciplinary team rounds were held today with , nursing, pharmacist and clinical coordinator. Patient's plan of care was discussed; medications were reviewed and discharge planning was addressed. ________________________________________________________________________ Total NON critical care TIME: 35   Minutes Total CRITICAL CARE TIME Spent:   Minutes non procedure based Comments >50% of visit spent in counseling and coordination of care    
________________________________________________________________________ Odin Melgar MD  
 
Procedures: see electronic medical records for all procedures/Xrays and details which were not copied into this note but were reviewed prior to creation of Plan. LABS: 
I reviewed today's most current labs and imaging studies. Pertinent labs include: 
Recent Labs 05/10/21 
0600 05/08/21 
0437 WBC 6.0 5.0 HGB 11.1* 11.0*  
HCT 33.5* 33.9*  
* 134* Recent Labs 05/08/21 
4841   
K 4.0  
 CO2 25 * BUN 13  
CREA 0.82 CA 8.4* Signed: Odin Melgar MD

## 2021-05-11 NOTE — PROGRESS NOTES
Bedside and Verbal shift change report given to Carol Licona (oncoming nurse) by German Lane (offgoing nurse). Report included the following information SBAR.

## 2021-05-11 NOTE — PROGRESS NOTES
End of Shift Note Bedside shift change report given to [place] (oncoming nurse) by Naa Diaz (offgoing nurse). Report included the following information SBAR, Kardex, Procedure Summary, Intake/Output, MAR and Recent Results Shift worked:  7p-7a Shift summary and any significant changes:  
  Pt slept. No acute events overnight. Concerns for physician to address:   
  
Zone phone for oncoming shift:   [phone] Activity: 
Activity Level: Bed Rest 
Number times ambulated in hallways past shift: 0 Number of times OOB to chair past shift: 0 Cardiac:  
Cardiac Monitoring: No     
Cardiac Rhythm: Sinus Rhythm Access:  
Current line(s): PIV Genitourinary:  
Urinary status: voiding Respiratory:  
O2 Device: None (Room air) Chronic home O2 use?: NO Incentive spirometer at bedside: YES 
  
GI: 
Last Bowel Movement Date: 05/06/21 Current diet:  DIET DIABETIC CONSISTENT CARB Regular Passing flatus: YES Tolerating current diet: YES Pain Management:  
Patient states pain is manageable on current regimen: YES Skin: 
Shukri Score: 20 Interventions: increase time out of bed, limit briefs, internal/external urinary devices and nutritional support Patient Safety: 
Fall Score: Total Score: 2 Interventions: pt to call before getting OOB High Fall Risk: Yes Length of Stay: 
Expected LOS: 2d 21h Actual LOS: 7 Naa Diaz

## 2021-05-11 NOTE — PROGRESS NOTES
Hospitalist Progress Note NAME: Amari Jean-Baptiste :  1980 MRN:  [id number] Nataly Brennan is a 36 y.o.  male past history significant for ischemic cardiomyopathy with ejection fraction of 20%, type 2 diabetes, hypertension, presenting with complaints of worsening foot ulcer that he had discovered about a week ago and that it has started to become purulent with pus in the last day. Assessment / Plan: 
Right foot osteomyelitis POA 
-X-ray of right foot shows evidence of Charcot joint versus septic arthritis/osteomyelitis 
-MRI of the right foot reviewed, shows osteomyelitis of third and fourth metatarsal 
-Follow blood cultures that were sent in the ED along with wound cultures 
-Continue clindamycin and cefepime that was started by admitting MD 
 
-Plan for right foot TMA today 
-Consult ID 
 
: 
Pt s/p TMA R foot yesterday Podiatry evals on going ID evals on going Continue with IV abx Await intra-op cx results : 
Continue to follow cx results Hopeful to discharge tomorrow with ID and Podiatry clearancee 5/10: 
Pending Podiatry and ID eval for today : Awaiting pathology result, need to make sure about clear margin Acute allergic angioedema, etiology red man syndrome Pt denies any lip, tongue or throat swelling. He denies any difficulty breathing. Saturating well on RA. Likely secondary to Vancomycin - therefore dc Give IV steroids and Loratadine Wean steroids as rash improves Type 2 diabetes mellitus with hyperglycemia Last A1c is 6.1% SSI 
  
Systolic heart failure ejection fraction of 20% History of coronary artery disease cardiac MRI showing only viable RCA territory underwent PTCA stenting in  Hypertension 
-Continue aspirin, statin and Coreg, lasix 
-Holding home Plavix in anticipation for surgery and restart when cleared by Podiatry History of left ventricular thrombus was on Eliquis for 6 months 
  
Code Status: Full code Surrogate Decision Maker: Wife information on record 
  
DVT Prophylaxis: SCDs - continue for now GI Prophylaxis: not indicated 
  
Baseline: Independent Body mass index is 23.7 kg/m². Subjective: Chief Complaint / Reason for Physician Visit No complains. Wondering when can he go home. Review of Systems: 
Symptom Y/N Comments  Symptom Y/N Comments Fever/Chills n   Chest Pain n   
Poor Appetite    Edema n   
Cough n   Abdominal Pain n   
Sputum    Joint Pain SOB/DANIELSON    Pruritis/Rash y   
Nausea/vomit    Tolerating PT/OT Diarrhea    Tolerating Diet y Constipation    Other Could NOT obtain due to:   
 
Objective: VITALS:  
Last 24hrs VS reviewed since prior progress note. Most recent are: 
Patient Vitals for the past 24 hrs: 
 Temp Pulse Resp BP SpO2  
05/11/21 1149 98.4 °F (36.9 °C) 65 16 (!) 140/86 99 % 05/11/21 0805 98.3 °F (36.8 °C) 90 16 (!) 140/77 100 % 05/11/21 0422 98.1 °F (36.7 °C) 67 17 (!) 142/77 100 % 05/11/21 0001 98.3 °F (36.8 °C) 64 16 (!) 142/76 99 % 05/10/21 1945 97.9 °F (36.6 °C) 67 16 (!) 159/84 100 % Intake/Output Summary (Last 24 hours) at 5/11/2021 1530 Last data filed at 5/11/2021 4384 Gross per 24 hour Intake 270 ml Output 2750 ml Net -2480 ml PHYSICAL EXAM: 
General: Alert, cooperative, no acute distress   
EENT:  EOMI. Anicteric sclerae. MMM. No tongue or lip swelling noted. Resp:  CTA bilaterally, no wheezing or rales. No accessory muscle use CV:  Regular  rhythm,  No edema GI:  Soft, Non distended, Non tender.  +Bowel sounds Neurologic:  Alert and oriented X 3, normal speech, Psych:   Not anxious nor agitated Skin:  Right foot wrapped. Reviewed most current lab test results and cultures  YES Reviewed most current radiology test results   YES Review and summation of old records today    NO Reviewed patient's current orders and MAR    YES 
PMH/SH reviewed - no change compared to H&P Current Facility-Administered Medications:  
  loratadine (CLARITIN) tablet 10 mg, 10 mg, Oral, DAILY, Christianne Cervantes MD, 10 mg at 05/11/21 0836 
  methylPREDNISolone (PF) (SOLU-MEDROL) injection 40 mg, 40 mg, IntraVENous, Q12H, Christianne Cervantes MD, 40 mg at 05/11/21 4366   diphenhydrAMINE (BENADRYL) capsule 25 mg, 25 mg, Oral, BID, Christianne Cervantes MD, 25 mg at 05/11/21 4761   DAPTOmycin (CUBICIN) 600 mg in 0.9% sodium chloride 50 mL IVPB, 600 mg, IntraVENous, Q24H, Jaquelin Morris MD, Last Rate: 100 mL/hr at 05/10/21 2123, 600 mg at 05/10/21 2123   metroNIDAZOLE (FLAGYL) IVPB premix 500 mg, 500 mg, IntraVENous, Q12H, Gladys Landnton J Carlos, DPM, Last Rate: 100 mL/hr at 05/11/21 1320, 500 mg at 05/11/21 1320   furosemide (LASIX) tablet 20 mg, 20 mg, Oral, DAILY, Belock, Ceferino J Carlos, DPM, 20 mg at 05/11/21 6039   oxyCODONE IR (ROXICODONE) tablet 5 mg, 5 mg, Oral, Q4H PRN, Belock, Ceferino J Carlos, DPM 
  aspirin delayed-release tablet 81 mg, 81 mg, Oral, DAILY, Belock, [place] J Carlos, DPM, 81 mg at 05/11/21 0330   carvediloL (COREG) tablet 12.5 mg, 12.5 mg, Oral, BID WITH MEALS, Belock, Ceferino J Carlos, DPM, 12.5 mg at 05/11/21 9746   sodium chloride (NS) flush 5-40 mL, 5-40 mL, IntraVENous, Q8H, Ernesto Land J, DPM, 10 mL at 05/11/21 1320 
  sodium chloride (NS) flush 5-40 mL, 5-40 mL, IntraVENous, PRN, Emery, [place] J Carlos, DPM, 10 mL at 05/11/21 9134   acetaminophen (TYLENOL) tablet 650 mg, 650 mg, Oral, Q6H PRN **OR** acetaminophen (TYLENOL) suppository 650 mg, 650 mg, Rectal, Q6H PRN, Belock, Ceferino J Carlos, DPM 
  polyethylene glycol (MIRALAX) packet 17 g, 17 g, Oral, DAILY PRN, Belock, [place] J Carlos, DPM 
  promethazine (PHENERGAN) tablet 12.5 mg, 12.5 mg, Oral, Q6H PRN **OR** ondansetron (ZOFRAN) injection 4 mg, 4 mg, IntraVENous, Q6H PRN, Belock, [place] J Carlos, DPM 
  glucose chewable tablet 16 g, 4 Tab, Oral, PRN, Belock, Ceferino J Carlos, DPM 
  dextrose (D50W) injection syrg 12.5-25 g, 25-50 mL, IntraVENous, PRN, Belock, [place] J Carlos, DPM, 25 g at 05/05/21 8322  glucagon (GLUCAGEN) injection 1 mg, 1 mg, IntraMUSCular, PRN, Ry Land, DPM 
  insulin lispro (HUMALOG) injection, , SubCUTAneous, AC&HS, Ry Land, DPM, 2 Units at 05/11/21 1149 
________________________________________________________________________ Care Plan discussed with: 
  Comments Patient y Family RN y   
Care Manager Consultant Multidiciplinary team rounds were held today with , nursing, pharmacist and clinical coordinator. Patient's plan of care was discussed; medications were reviewed and discharge planning was addressed. ________________________________________________________________________ Total NON critical care TIME: 35   Minutes Total CRITICAL CARE TIME Spent:   Minutes non procedure based Comments >50% of visit spent in counseling and coordination of care    
________________________________________________________________________ Noah Goldman MD  
 
Procedures: see electronic medical records for all procedures/Xrays and details which were not copied into this note but were reviewed prior to creation of Plan. LABS: 
I reviewed today's most current labs and imaging studies. Pertinent labs include: 
Recent Labs 05/10/21 
0600 WBC 6.0 HGB 11.1*  
HCT 33.5*  
* Recent Labs 05/11/21 
0543   
K 3.9  CO2 25 * BUN 18 CREA 0.95 CA 9.1 Signed: Noah Goldman MD

## 2021-05-12 NOTE — PROGRESS NOTES
Podiatry Bone margins clear, but MRI indicated that there should still be osteomyelitis at this level of the metatarsals. However, the pathology report indicated that there was no osteomyelitis at all, including the exposed bone with pus drainage from the 4th metatarsal. I think it would be safest to assume the MRI is correct and the remaining 3rd and 4th metatarsals have osteomyelitis. Once Dr. Rose Johnson is satisfied with the C&S results, he can be discharged from the stand-point of Podiatry.

## 2021-05-12 NOTE — PROGRESS NOTES
Bedside and Verbal shift change report given to Renan Horner (oncoming nurse) by Tara Zepeda (offgoing nurse). Report included the following information SBAR, Kardex, Procedure Summary, Intake/Output, MAR and Recent Results.

## 2021-05-12 NOTE — DISCHARGE INSTRUCTIONS
HOSPITALIST DISCHARGE INSTRUCTIONS    NAME: Jimenez Weiner   :  1980   MRN:  [id number]     Date/Time:  2021 11:36 AM    ADMIT DATE: 2021   DISCHARGE DATE: 2021   Right foot osteomyelitis POA  s/p TMA R foot  Type 2 diabetes mellitus with hyperglycemia      · It is important that you take the medication exactly as they are prescribed. · Keep your medication in the bottles provided by the pharmacist and keep a list of the medication names, dosages, and times to be taken in your wallet. · Do not take other medications without consulting your doctor. What to do at [address]:  Cardiac Diet    Recommended activity: Activity as tolerated      If you have questions regarding the hospital related prescriptions or hospital related issues please call [facility] at [phone]. You can always direct your questions to your primary care doctor if you are unable to reach your hospital physician; your PCP works as an extension of your hospital doctor just like your hospital doctor is an extension of your PCP for your time at the [facility], [facility])    If you experience any of the following symptoms then please call your primary care physician or return to the emergency room if you cannot get hold of your doctor:    Fever, chills, nausea, vomiting, or persistent diarrhea  Worsening weakness or new problems with your speech or balance  Dark stools or visible blood in your stools  New Leg swelling or shortness of breath as these could be signs of a clot    Additional Instructions:      Bring these papers with you to your follow up appointments. The papers will help your doctors be sure to continue the care plan from the hospital.              Information obtained by :  I understand that if any problems occur once I am at home I am to contact my physician. I understand and acknowledge receipt of the instructions indicated above. Physician's or R.N.'s Signature                                                                  Date/Time                                                                                                                                              Patient or Representative Signature

## 2021-05-12 NOTE — PROGRESS NOTES
Bedside and Verbal shift change report given to Dee Dee Garcia (oncoming nurse) by Yomi Montague (offgoing nurse). Report included the following information SBAR.

## 2021-05-12 NOTE — PROGRESS NOTES
DISCHARGE NOTE FROM [facility] NURSE Patient determined to be stable for discharge by attending provider. I have reviewed the discharge instructions with the patient. They verbalized understanding and all questions were answered to their satisfaction. No complaints or further questions were expressed. Medications sent to . pharmacy. Appropriate educational materials and medication side effect teaching were provided. PIV were removed prior to discharge.   
 
 
Selam Howard RN

## 2021-05-12 NOTE — PROGRESS NOTES
Transition of Care Plan: 
  
RUR: 11% Disposition: Home Follow up appointments: on AVS 
DME needed: none Transportation at Discharge: Sister to provide transportation Keys or means to access home:  Sister has keys to house IM Medicare letter: N/A Caregiver Contact: SisterJeana Crigler [phone] Discharge Caregiver contacted prior to discharge? Patient is discharging home today. Due to insurance & location,CM unable to find accepting [facility] provider. ID provided order for pt to take to [facility] in 2 weeks for blood work. Patient stated he is able to change his own dressing. RN to provide pt with supplies for dressing changes. Follow-up appointment is on the AVS.  Patient is ready for d/c from CM standpoint. Care Management Interventions PCP Verified by CM: Yes Mode of Transport at Discharge: Other (see comment)(Sister to provide transportation ) Transition of Care Consult (CM Consult): Discharge Planning Discharge Durable Medical Equipment: No 
Physical Therapy Consult: No 
Occupational Therapy Consult: No 
Speech Therapy Consult: No 
Current Support Network: Relative's Home(Lives with father and sister) Confirm Follow Up Transport: Family Discharge Location Discharge Placement: Home Hayley Mitchell Ext [phone]

## 2021-05-12 NOTE — DISCHARGE SUMMARY
Hospitalist Discharge Summary Patient ID: Adriana Cruz 
[id number] 
41 y.o. 
1980 5/4/2021 PCP on record: None Admit date: 5/4/2021 Discharge date and time: 5/12/2021 DISCHARGE DIAGNOSIS: 
 
Right foot osteomyelitis POA 
s/p TMA R foot Type 2 diabetes mellitus with hyperglycemia 
 
  
 
 
 
CONSULTATIONS: 
IP CONSULT TO PODIATRY IP CONSULT TO INFECTIOUS DISEASES Excerpted HPI from H&P of Tatiana Milton MD: Finn White is a 36 y.o.  male past history significant for ischemic cardiomyopathy with ejection fraction of 20%, type 2 diabetes, hypertension, presenting with complaints of worsening foot ulcer that he had discovered about a week ago and that it has started to become purulent with pus in the last day. He denies any pain, no chills or fever, No respiratory symptoms, GI or urinary symptoms 
 
______________________________________________________________________ DISCHARGE SUMMARY/HOSPITAL COURSE:  for full details see H&P, daily progress notes, labs, consult notes. Right foot osteomyelitis POA 
-X-ray of right foot shows evidence of Charcot joint versus septic arthritis/osteomyelitis 
-MRI of the right foot reviewed, shows osteomyelitis of third and fourth metatarsal 
-Blood culture remain negative 
-Patient was receiving broad-spectrum antibiotics 
 
-Patient had a right foot TMA, by podiatry 
-Had a pathology sent for margin, which came out to be clear 
-Podiatrist had a concern for persistent osteomyelitis on MRI, discussed with podiatry and ID today. Okay to send home with p.o. doxycycline 
-Patient will have labs every 2 weeks, he was given prescription, he knows that he needs to go to clinic/laboratory - Acute allergic reaction, etiology red man syndrome Pt denies any lip, tongue or throat swelling. He denies any difficulty breathing. Saturating well on RA. Likely secondary to Vancomycin - therefore dc Give IV steroids and Loratadine  
 his rash continued improved, right now healing, will give Medrol Dosepak 
  
Type 2 diabetes mellitus with hyperglycemia Last A1c is 6.1% SSI 
  
Systolic heart failure ejection fraction of 20% History of coronary artery disease cardiac MRI showing only viable RCA territory underwent PTCA stenting in 12/19 Hypertension Resume Plavix, discussed with podiatry Continue aspirin, Coreg, losartan 
  
History of left ventricular thrombus was on Eliquis for 6 months 
  
 
 
 
_______________________________________________________________________ Patient seen and examined by me on discharge day. Pertinent Findings: 
Gen:    Not in distress Chest: Clear lungs CVS:   Regular rhythm. No edema Abd:  Soft, not distended, not tender Neuro:  Alert,  
_______________________________________________________________________ DISCHARGE MEDICATIONS:  
Current Discharge Medication List  
  
START taking these medications Details  
doxycycline (MONODOX) 100 mg capsule Take 1 Cap by mouth two (2) times a day for 42 days. Qty: 84 Cap, Refills: 0 Start date: 5/12/2021, End date: 6/23/2021 OTHER Check CBC,CMP, ESR. Fax results to Dr Nacho Eid, to [phone]. Call critical labs to [phone] Qty: 1 Each, Refills: 0 Start date: 5/12/2021 CONTINUE these medications which have CHANGED Details  
methylPREDNISolone (MEDROL DOSEPACK) 4 mg tablet As directed. Qty: 1 Dose Pack, Refills: 0 Start date: 5/12/2021 CONTINUE these medications which have NOT CHANGED  Details  
atorvastatin (LIPITOR) 40 mg tablet Take 1 tablet by mouth nightly 
Qty: 30 Tab, Refills: 0  
  
carvediloL (COREG) 12.5 mg tablet Take 1 tablet by mouth twice daily 
Qty: 60 Tab, Refills: 0  
  
clopidogreL (PLAVIX) 75 mg tab Take 1 tablet by mouth once daily 
Qty: 30 Tab, Refills: 0  
  
furosemide (LASIX) 40 mg tablet Take 1 tablet by mouth once daily 
Qty: 30 Tab, Refills: 0  
  
losartan (COZAAR) 50 mg tablet Take 1 tablet by mouth once daily 
Qty: 30 Tab, Refills: 1  
  
aspirin delayed-release 81 mg tablet Take 1 Tab by mouth daily. Qty: 30 Tab, Refills: 6 Patient Follow Up Instructions: Activity: Activity as tolerated, advised about not weightbearing on right foot, until cleared by podiatry Diet: Cardiac Diet Wound Care: As directed Follow-up Information Follow up With Specialties Details Why Contact Adebayo Jones MD Infectious Disease, Internal Medicine On 6/17/2021 10:30am  Virtual follow-up. Office will call prior to appointment time [facility] 
[phone] Alanis Shabazz DPM  On 5/21/2021 10;30am   post-op   Please arrive 15min ealry and bring a photo ID, insurance card & a list of medication [id number], [address] [facility] 
[phone] None    None ([phone]) Patient stated that they have no PCP 
  
  
 
________________________________________________________________ Risk of deterioration: Low 
 
Condition at Discharge:  Stable 
__________________________________________________________________ Disposition Home with family and home health services 
 
____________________________________________________________________ Code Status: Full Code 
___________________________________________________________________ Total time in minutes spent coordinating this discharge (includes going over instructions, follow-up, prescriptions, and preparing report for sign off to her PCP) :  >30 minutes Signed: 
Kamran Pierre MD

## 2021-05-12 NOTE — TELEPHONE ENCOUNTER
Please add patient to my virtual clinic schedule on 6/17 at 1030-11 a.m.   Patient notification not required

## 2021-05-12 NOTE — PROGRESS NOTES
Infectious Disease Progress Note IMPRESSION:  
· Diabetic foot with chronic draining plantar ulcers ,osteomyelitis of right foot · X-ray-evidence of Charcot joint vs septic arthritis/osteomyelitis · MRI-OM of third and fourth metatarsals, associated ulceration and sinus tracts extending to plantar surface of foot. Reactive changes of first MT head, early OM difficult to exclude · S/p transmetatarsal amputation of right foot on  · Wound culture-MSSA( patient has been on antibiotics prior to surgery), pathology- no OM · H/o MRSA infection per pt · Diabetes mellitus type 2 with hyperglycemia, A1c six-point · CHF/CMP EF 20 · H/o left ventricular thrombus on Eliquis · Maculopapular rash? Vancomycin allergy · Normal resting DARIO · ESR 62/( ) ,repeat 17( 5/10) PLAN:  
  
· DCon Doxycycline 100 mg po bid x 6 weeks end date  · Pt advise  about using sunscreen while outdoors · Blood sugar control · Daily probiotic, yogurt. · CBC, CMP, ESR every 2 weeks , fax results to [phone], call with critical Labs at [phone] · First set of labs ordered, pt to get bloodwork done in 2 weeks · ID virtual clinic follow up on  at 1030 · Plan of care discussed with patient. Subjective:  
 
Patient seen today. Says he feels \"great \" Denies complaints. Review of Systems:  A comprehensive review of systems was negative except for that written in the History of Present Illness. 14 point ROS obtained . All other systems negative . Objective:  
 
Blood pressure 127/78, pulse 72, temperature 98.6 °F (37 °C), resp. rate 16, height 6' 1\" (1.854 m), weight 179 lb 9.6 oz (81.5 kg), SpO2 98 %. Temp (24hrs), Av.3 °F (36.8 °C), Min:98.1 °F (36.7 °C), Max:98.6 °F (37 °C) Patient Vitals for the past 24 hrs: 
 Temp Pulse Resp BP SpO2  
21 0822 98.6 °F (37 °C) 72 16 127/78 98 % 21 0444 98.1 °F (36.7 °C) 63 16 (!) 145/80 100 % 21 5235 98.3 °F (36.8 °C) 68 16 132/81 100 % 05/11/21 2047 98.2 °F (36.8 °C) 62 16 138/84 100 % 05/11/21 1614 98.1 °F (36.7 °C) 67 16 139/81 99 % 05/11/21 1149 98.4 °F (36.9 °C) 65 16 (!) 140/86 99 % Lines:  Peripheral IV:    
 
Physical Exam:  
General:  Awake cooperative, Eyes:  Sclera anicteric. Pupils equally round and reactive to light. Erythema of cheeks noted Mouth/Throat: Mucous membranes , oral pharynx clear Neck: Supple Lungs:   reduced auscultation bilaterally, good effort CV:  Regular rate and rhythm,no murmur, click, rub or gallop Abdomen:   Soft, non-tender. bowel sounds normal. non-distended Extremities: No cyanosis or edema Skin:  Macular rash on upper extremities back turgor normal. no acute rash or lesions Lymph nodes: Cervical and supraclavicular normal  
Musculoskeletal: No swelling or deformity Lines/Devices:  Intact, no erythema, drainage or tenderness Psych: Alert and oriented, normal mood affect Data Reviewed: CBC:  
Recent Labs 05/12/21 
0544 05/10/21 
0600 WBC 8.5 6.0  
RBC 4.17 4.01* HGB 11.7* 11.1*  
HCT 35.1* 33.5*  
* 135* CMP:  
Recent Labs 05/11/21 
0543 *   
K 3.9  CO2 25 BUN 18 CREA 0.95 CA 9.1 AGAP 6  
BUCR 19 Studies:     
Lab Results Component Value Date/Time Culture result: NO GROWTH THUS FAR, HOLDING FOR 7 DAYS 05/07/2021 06:30 PM  
 Culture result: LIGHT STAPHYLOCOCCUS AUREUS (A) 05/07/2021 06:30 PM  
 Culture result: NO FUNGUS ISOLATED 3 DAYS 05/07/2021 06:30 PM  
 Culture result: MODERATE STAPHYLOCOCCUS AUREUS (A) 05/04/2021 05:58 PM  
 Culture result: NO GROWTH 6 DAYS 05/04/2021 04:47 PM  
  
 
XR Results (most recent): 
Results from Hospital Encounter encounter on 05/04/21 XR FOOT RT MIN 3 V Narrative EXAM: XR FOOT RT MIN 3 V 
 
INDICATION: Right foot ulcer. COMPARISON: 10/24/2020 FINDINGS: Three views of the right foot. The location of the ulcer is not 
provided.  There is significant interval erosion of the head of the first 
metatarsal, base of the first proximal phalanx, and first sesamoid bone. Previously, there was only subtle periarticular lucency. Charcot joint is 
slightly favored over septic arthritis and osteomyelitis. An ulcer over the 
first MTP joint would make septic arthritis more likely. Post transmetatarsal 
amputation second-fourth digits, without amputation of the third-fifth 
phalanges. Impression Significant progression of erosion of the first MTP joint. Charcot 
joint is slightly favored over septic arthritis and osteomyelitis. An ulcer over 
the first MTP joint would make septic arthritis more likely. Patient Active Problem List  
Diagnosis Code  Non-healing ulcer of foot ([facility] L97.509  Biventricular failure ([facility]) I50.82  Hypertension I10  
 Ischemic cardiomyopathy I25.5  Coronary artery disease involving native coronary artery of native heart without angina pectoris I25.10  
 S/P PTCA (percutaneous transluminal coronary angioplasty) Z98.61  
 Diabetic foot infection ([facility]) E11.628, L08.9 ICD-10-CM ICD-9-CM 1. Diabetic foot ulcer with osteomyelitis ([facility]  E11.621 250.80   
 E11.69 707.15   
 L97.509 730.27 M86.9 731.8 2. Cellulitis of foot  L03.119 682.7 I have discussed the diagnosis with the patient and the intended plan as seen in the above orders. I have discussed medication side effects and warnings with the patient as well. Reviewed test results at length with patient Anti-infectives: S/p Vancomycin, Cefepime, Flagyl IV  Lennie Morales MD FACP

## 2021-05-12 NOTE — PROGRESS NOTES
End of Shift Note Bedside shift change report given to [place] (oncoming nurse) by Kristie Bernal (offgoing nurse). Report included the following information SBAR, Kardex, Procedure Summary, Intake/Output, MAR and Recent Results Shift worked:  7p-7a Shift summary and any significant changes:  
  Uneventful night. Concerns for physician to address:   
  
Zone phone [phone] Activity: 
Activity Level: Up with Assistance Number times ambulated in hallways past shift: 0 Number of times OOB to chair past shift: 0 Cardiac:  
Cardiac Monitoring: No     
Cardiac Rhythm: Sinus Rhythm Access:  
Current line(s): PIV Genitourinary:  
Urinary status: voiding Respiratory:  
O2 Device: None (Room air) Chronic home O2 use?: NO Incentive spirometer at bedside: YES 
  
GI: 
Last Bowel Movement Date: 05/10/21 Current diet:  DIET DIABETIC CONSISTENT CARB Regular Passing flatus: YES Tolerating current diet: YES Pain Management:  
Patient states pain is manageable on current regimen: YES Skin: 
Shukri Score: 20 Interventions: increase time out of bed, limit briefs, internal/external urinary devices and nutritional support Patient Safety: 
Fall Score: Total Score: 2 Interventions: assistive device (walker, cane, etc), gripper socks and pt to call before getting OOB High Fall Risk: Yes Length of Stay: 
Expected LOS: 2d 21h Actual LOS: 8 Kristie Bernal

## 2021-07-12 ENCOUNTER — TELEPHONE (OUTPATIENT)
Dept: CARDIOLOGY CLINIC | Age: 41
End: 2021-07-12

## 2021-07-12 NOTE — TELEPHONE ENCOUNTER
Received a phone call from [address] [place] [address] at the  home. He stated that the death certificate needed to changed. Notified Dr. Dyana Kendall. Will try to get in contact with Dr. Dianne Bain. She is the last provider that saw the patient.

## 2021-09-28 NOTE — BRIEF OP NOTE
Brief Postoperative Note Patient: Stephanie Heck YOB: 1980 MRN: [id number] Date of Procedure: 5/7/2021 Pre-Op Diagnosis: OSTEOMYELITIS RIGHT FOOT Post-Op Diagnosis: Same as preoperative diagnosis. Procedure(s): 
AMPUTATION RIGHT TRANSMETATARSAL Surgeon(s): 
Reba Navarro DPM 
 
Surgical Assistant: None Anesthesia: MAC Estimated Blood Loss (mL): 200 Complications: None Specimens:  
ID Type Source Tests Collected by Time Destination 1 : OSTEOMYELITIS FIRST THIRD AND FOURTH METATARSALS Preservative Foot, Right  [facility] 5/7/2021 1827 Pathology 2 : BONE MARGIN RIGHT THIRD METATARSAL Preservative Foot, Right  [facility] 5/7/2021 1834 Pathology 3 : BONE MARGIN RIGHT FOURTH METATARSAL Preservative Foot, Right  [facility] 5/7/2021 1835 Pathology 1 : RIGHT FOOT OSTEOMYELITIS Tissue Foot, Right CULTURE, ANAEROBIC, CULTURE & SMEAR, AFB, CULTURE, FUNGUS, CULTURE, TISSUE W Willie Closs [facility] 5/7/2021 1805 Microbiology Implants:  
Implant Name Type Inv. Item Serial No.  Lot No. [id number] No. Used [employer]    [id number] Right 1 Implanted Drains: * No LDAs found * Findings: Healthy, bleeding tissue after tourniquet released; some necrotic deep tissue, but no active pus encountered.  
 
Electronically Signed by Yadi Barajas DPM on 5/7/2021 at 7:35 PM 
 
 Pls call him and see what drops he needs - and unsure if MD will refill ~Thanks

## 2025-01-12 NOTE — PROGRESS NOTES
Problem: Alteration in Thoughts and Perception  Goal: Verbalize thoughts and feelings  Description: Interventions:  - Promote a nonjudgmental and trusting relationship with the patient through active listening and therapeutic communication  - Assess patient's level of functioning, behavior and potential for risk  - Engage patient in 1 on 1 interactions  - Encourage patient to express fears, feelings, frustrations, and discuss symptoms    - [place] patient to reality, help patient recognize reality-based thinking   - Administer medications as ordered and assess for potential side effects  - Provide the patient education related to the signs and symptoms of the illness and desired effects of prescribed medications  1/12/2025 0401 by Lila Paula RN  Outcome: Progressing  1/11/2025 2244 by Lila Paula RN  Outcome: Progressing     Problem: Depression  Goal: Refrain from harming self  Description: Interventions:  - Monitor patient closely, per order   - Supervise medication ingestion, monitor effects and side effects   1/12/2025 0401 by Lila Paula RN  Outcome: Progressing  1/11/2025 2244 by Lila Paula RN  Outcome: Progressing  Goal: Refrain from self-neglect  1/12/2025 0401 by Lila Paula RN  Outcome: Progressing  1/11/2025 2244 by Lila Paula RN  Outcome: Progressing     Problem: Alteration in Thoughts and Perception  Goal: Treatment Goal: Gain control of psychotic behaviors/thinking, reduce/eliminate presenting symptoms and demonstrate improved reality functioning upon discharge  1/12/2025 0401 by Lila Paula RN  Outcome: Not Progressing  1/11/2025 2244 by Lila Paula RN  Outcome: Not Progressing  Goal: Agree to be compliant with medication regime, as prescribed and report medication side effects  Description: Interventions:  - Offer appropriate PRN medication and supervise ingestion; conduct AIMS, as needed   1/12/2025 0401 by  Edema better since admission Clean shallow ulcer L dorsum should heal w/o problem Closed callus beneath R 1st MT head Open neuropathic wound beneath R 5th MT head where he debrided his own foot Needs outpt follow up - he identifies Dr Bernardo Barrios at \[facility]\"" as his 'foot surgeon' Lila Paula RN  Outcome: Not Progressing  1/11/2025 2244 by Lila Paula RN  Outcome: Not Progressing  Goal: Recognize dysfunctional thoughts, communicate reality-based thoughts at the time of discharge  Description: Interventions:  - Provide medication and psycho-education to assist patient in compliance and developing insight into his/her illness   1/12/2025 0401 by Lila Paula RN  Outcome: Not Progressing  1/11/2025 2244 by Lila Paula RN  Outcome: Not Progressing     Problem: Depression  Goal: Treatment Goal: Demonstrate behavioral control of depressive symptoms, verbalize feelings of improved mood/affect, and adopt new coping skills prior to discharge  1/12/2025 0401 by Lila Paula RN  Outcome: Not Progressing  1/11/2025 2244 by Lila Paula RN  Outcome: Not Progressing  Goal: Attend and participate in unit activities, including therapeutic, recreational, and educational groups  Description: Interventions:  - Provide therapeutic and educational activities daily, encourage attendance and participation, and document same in the medical record   1/12/2025 0401 by Lila Paula RN  Outcome: Not Progressing  1/11/2025 2244 by Lila Paula RN  Outcome: Not Progressing

## (undated) DEVICE — INFECTION CONTROL KIT SYS

## (undated) DEVICE — NEEDLE HYPO 18GA L1.5IN PNK S STL HUB POLYPR SHLD REG BVL

## (undated) DEVICE — GLIDESHEATH SLENDER ACCESS KIT: Brand: GLIDESHEATH SLENDER

## (undated) DEVICE — EXTREMITY III-LF: Brand: MEDLINE INDUSTRIES, INC.

## (undated) DEVICE — TREK CORONARY DILATATION CATHETER 2.50 MM X 12 MM / RAPID-EXCHANGE: Brand: TREK

## (undated) DEVICE — TR BAND RADIAL ARTERY COMPRESSION DEVICE: Brand: TR BAND

## (undated) DEVICE — SYRINGE ANGIO 10 CC BRL STD PRNT POLYCARB LT BLU MEDALLION

## (undated) DEVICE — SPLINT WR POS F/ARTERIAL ACC -- BX/10

## (undated) DEVICE — MEDI-TRACE CADENCE ADULT, DEFIBRILLATION ELECTRODE -RTS  (10 PR/PK) - PHILIPS: Brand: MEDI-TRACE CADENCE

## (undated) DEVICE — ZIMMER® STERILE DISPOSABLE TOURNIQUET CUFF WITH PROTECTIVE SLEEVE AND PLC, DUAL PORT, SINGLE BLADDER, 18 IN. (46 CM)

## (undated) DEVICE — REM POLYHESIVE ADULT PATIENT RETURN ELECTRODE: Brand: VALLEYLAB

## (undated) DEVICE — TUBING SUCT 12FR MAL ALUM SHFT FN CAP VENT UNIV CONN W/ OBT

## (undated) DEVICE — ROCKER SWITCH PENCIL HOLSTER: Brand: VALLEYLAB

## (undated) DEVICE — PACK PROCEDURE SURG HRT CATH

## (undated) DEVICE — SYR 10ML LUER LOK 1/5ML GRAD --

## (undated) DEVICE — HI-TORQUE VERSACORE FLOPPY GUIDE WIRE SYSTEM 145 CM: Brand: HI-TORQUE VERSACORE

## (undated) DEVICE — 4.0MM EGG

## (undated) DEVICE — BANDAGE,GAUZE,BULKEE II,4.5"X4.1YD,STRL: Brand: MEDLINE

## (undated) DEVICE — CATHETER GUID 6FR L100CM DIA0.071IN NYL SHFT RBU4.0 W/O

## (undated) DEVICE — CUSTOM KT PTCA INFL DEV K05 00053H

## (undated) DEVICE — STRAP,POSITIONING,KNEE/BODY,FOAM,4X60": Brand: MEDLINE

## (undated) DEVICE — BOWL UTIL GRAD 32OZ STRL --

## (undated) DEVICE — SUTURE ETHLN SZ 4-0 L18IN NONABSORBABLE BLK L19MM PS-2 3/8 1667H

## (undated) DEVICE — KIT ACCS INTRO 4FR L10CM NDL 21GA L7CM GWIRE L40CM

## (undated) DEVICE — TUBING PRSS MON L6IN PVC M FEM CONN

## (undated) DEVICE — BANDAGE COBAN 4 IN COMPR W4INXL5YD FOAM COHESIVE QUIK STK SELF ADH SFT

## (undated) DEVICE — RUNTHROUGH NS EXTRA FLOPPY PTCA GUIDEWIRE: Brand: RUNTHROUGH

## (undated) DEVICE — NEEDLE HYPO 25GA L1.5IN BVL ORIENTED ECLIPSE

## (undated) DEVICE — CONTAINER,SPEC,PNEUM TUBE,3OZ,STRL PATH: Brand: MEDLINE

## (undated) DEVICE — STENT RONYX30022UX RESOLUTE ONYX 3.00X22
Type: IMPLANTABLE DEVICE | Status: NON-FUNCTIONAL
Brand: RESOLUTE ONYX™

## (undated) DEVICE — GUIDEWIRE VASC L260CM 0.035IN J TIP L3MM PTFE FIX COR NAMIC

## (undated) DEVICE — CATH GUID .056IN 6FR 150CM -- GUIDELINER V3

## (undated) DEVICE — 3M™ TEGADERM™ TRANSPARENT FILM DRESSING FRAME STYLE, 1626W, 4 IN X 4-3/4 IN (10 CM X 12 CM), 50/CT 4CT/CASE: Brand: 3M™ TEGADERM™

## (undated) DEVICE — ANGIOGRAPHY KIT CUST [K0910930B] [MERIT MEDICAL SYSTEMS INC]

## (undated) DEVICE — CATHETER ETER ANGIO L110CM OD5FR ID046IN L75CM 038IN 145DEG CARD

## (undated) DEVICE — RADIFOCUS OPTITORQUE ANGIOGRAPHIC CATHETER: Brand: OPTITORQUE

## (undated) DEVICE — DRSG GZ OIL EMUL CURAD 3X3 --

## (undated) DEVICE — SUTURE VCRL SZ 3-0 L27IN ABSRB UD L24MM PS-1 3/8 CIR PRIM J936H

## (undated) DEVICE — TRAY PREP DRY W/ PREM GLV 2 APPL 6 SPNG 2 UNDPD 1 OVERWRAP

## (undated) DEVICE — STERILE POLYISOPRENE POWDER-FREE SURGICAL GLOVES: Brand: PROTEXIS